# Patient Record
Sex: MALE
[De-identification: names, ages, dates, MRNs, and addresses within clinical notes are randomized per-mention and may not be internally consistent; named-entity substitution may affect disease eponyms.]

---

## 2017-04-25 ENCOUNTER — APPOINTMENT (OUTPATIENT)
Dept: INTERNAL MEDICINE | Facility: CLINIC | Age: 37
End: 2017-04-25

## 2017-04-25 VITALS
HEART RATE: 84 BPM | DIASTOLIC BLOOD PRESSURE: 88 MMHG | WEIGHT: 222 LBS | BODY MASS INDEX: 34.77 KG/M2 | SYSTOLIC BLOOD PRESSURE: 118 MMHG | TEMPERATURE: 98.3 F

## 2017-04-25 LAB
APPEARANCE: CLEAR
BACTERIA: NEGATIVE
BASOPHILS # BLD AUTO: 0.04 K/UL
BASOPHILS NFR BLD AUTO: 0.5 %
BILIRUBIN URINE: NEGATIVE
BLOOD URINE: NEGATIVE
COLOR: YELLOW
EOSINOPHIL # BLD AUTO: 0.12 K/UL
EOSINOPHIL NFR BLD AUTO: 1.6 %
GLUCOSE QUALITATIVE U: NORMAL MG/DL
HCT VFR BLD CALC: 46.3 %
HGB BLD-MCNC: 16.2 G/DL
HYALINE CASTS: 0 /LPF
IMM GRANULOCYTES NFR BLD AUTO: 0.4 %
KETONES URINE: NEGATIVE
LEUKOCYTE ESTERASE URINE: NEGATIVE
LYMPHOCYTES # BLD AUTO: 2.17 K/UL
LYMPHOCYTES NFR BLD AUTO: 28.4 %
MAN DIFF?: NORMAL
MCHC RBC-ENTMCNC: 32.1 PG
MCHC RBC-ENTMCNC: 35 GM/DL
MCV RBC AUTO: 91.9 FL
MICROSCOPIC-UA: NORMAL
MONOCYTES # BLD AUTO: 0.46 K/UL
MONOCYTES NFR BLD AUTO: 6 %
NEUTROPHILS # BLD AUTO: 4.83 K/UL
NEUTROPHILS NFR BLD AUTO: 63.1 %
NITRITE URINE: NEGATIVE
PH URINE: 5.5
PLATELET # BLD AUTO: 253 K/UL
PROTEIN URINE: NEGATIVE MG/DL
RBC # BLD: 5.04 M/UL
RBC # FLD: 12.9 %
RED BLOOD CELLS URINE: 1 /HPF
SPECIFIC GRAVITY URINE: 1.02
SQUAMOUS EPITHELIAL CELLS: 1 /HPF
UROBILINOGEN URINE: NORMAL MG/DL
WBC # FLD AUTO: 7.65 K/UL
WHITE BLOOD CELLS URINE: 2 /HPF

## 2017-04-26 ENCOUNTER — TRANSCRIPTION ENCOUNTER (OUTPATIENT)
Age: 37
End: 2017-04-26

## 2017-04-26 LAB
25(OH)D3 SERPL-MCNC: 8.6 NG/ML
ALBUMIN SERPL ELPH-MCNC: 4.6 G/DL
ALP BLD-CCNC: 98 U/L
ALT SERPL-CCNC: 79 U/L
ANION GAP SERPL CALC-SCNC: 18 MMOL/L
AST SERPL-CCNC: 51 U/L
BILIRUB SERPL-MCNC: 0.3 MG/DL
BUN SERPL-MCNC: 13 MG/DL
CALCIUM SERPL-MCNC: 9.3 MG/DL
CHLORIDE SERPL-SCNC: 104 MMOL/L
CHOLEST SERPL-MCNC: 256 MG/DL
CHOLEST/HDLC SERPL: 9.5 RATIO
CO2 SERPL-SCNC: 19 MMOL/L
CREAT SERPL-MCNC: 0.93 MG/DL
FOLATE SERPL-MCNC: 8.5 NG/ML
FRUCTOSAMINE SERPL-MCNC: 373 UMOL/L
GLUCOSE SERPL-MCNC: 164 MG/DL
HBA1C MFR BLD HPLC: 11.7 %
HDLC SERPL-MCNC: 27 MG/DL
LDLC SERPL CALC-MCNC: NORMAL MG/DL
MAGNESIUM SERPL-MCNC: 1.8 MG/DL
POTASSIUM SERPL-SCNC: 4.2 MMOL/L
PROT SERPL-MCNC: 7.4 G/DL
SODIUM SERPL-SCNC: 141 MMOL/L
TRIGL SERPL-MCNC: 1180 MG/DL
VIT B12 SERPL-MCNC: 621 PG/ML

## 2017-05-15 ENCOUNTER — APPOINTMENT (OUTPATIENT)
Dept: INTERNAL MEDICINE | Facility: CLINIC | Age: 37
End: 2017-05-15

## 2017-05-15 VITALS
DIASTOLIC BLOOD PRESSURE: 86 MMHG | BODY MASS INDEX: 35.55 KG/M2 | SYSTOLIC BLOOD PRESSURE: 124 MMHG | HEART RATE: 79 BPM | TEMPERATURE: 99.4 F | WEIGHT: 227 LBS

## 2017-06-12 ENCOUNTER — APPOINTMENT (OUTPATIENT)
Dept: ENDOCRINOLOGY | Facility: CLINIC | Age: 37
End: 2017-06-12

## 2017-07-05 ENCOUNTER — APPOINTMENT (OUTPATIENT)
Dept: INTERNAL MEDICINE | Facility: CLINIC | Age: 37
End: 2017-07-05

## 2018-05-22 ENCOUNTER — APPOINTMENT (OUTPATIENT)
Dept: INTERNAL MEDICINE | Facility: CLINIC | Age: 38
End: 2018-05-22

## 2020-02-21 ENCOUNTER — INPATIENT (INPATIENT)
Facility: HOSPITAL | Age: 40
LOS: 3 days | Discharge: HOME | DRG: 439 | End: 2020-02-25
Attending: INTERNAL MEDICINE | Admitting: INTERNAL MEDICINE
Payer: MEDICAID

## 2020-02-21 VITALS
DIASTOLIC BLOOD PRESSURE: 122 MMHG | RESPIRATION RATE: 16 BRPM | HEART RATE: 75 BPM | WEIGHT: 210.1 LBS | HEIGHT: 67 IN | TEMPERATURE: 98 F | OXYGEN SATURATION: 99 % | SYSTOLIC BLOOD PRESSURE: 172 MMHG

## 2020-02-21 DIAGNOSIS — K85.90 ACUTE PANCREATITIS WITHOUT NECROSIS OR INFECTION, UNSPECIFIED: ICD-10-CM

## 2020-02-21 DIAGNOSIS — Z98.89 OTHER SPECIFIED POSTPROCEDURAL STATES: Chronic | ICD-10-CM

## 2020-02-21 LAB
ALBUMIN SERPL ELPH-MCNC: 3.7 G/DL — SIGNIFICANT CHANGE UP (ref 3.5–5)
ALP SERPL-CCNC: 88 U/L — SIGNIFICANT CHANGE UP (ref 40–120)
ALT FLD-CCNC: 107 U/L DA — HIGH (ref 10–60)
ANION GAP SERPL CALC-SCNC: 14 MMOL/L — SIGNIFICANT CHANGE UP (ref 5–17)
APTT BLD: 32.6 SEC — SIGNIFICANT CHANGE UP (ref 27.5–36.3)
AST SERPL-CCNC: 85 U/L — HIGH (ref 10–40)
BASOPHILS # BLD AUTO: 0.05 K/UL — SIGNIFICANT CHANGE UP (ref 0–0.2)
BASOPHILS NFR BLD AUTO: 0.6 % — SIGNIFICANT CHANGE UP (ref 0–2)
BILIRUB SERPL-MCNC: 0.9 MG/DL — SIGNIFICANT CHANGE UP (ref 0.2–1.2)
BUN SERPL-MCNC: 13 MG/DL — SIGNIFICANT CHANGE UP (ref 7–18)
CALCIUM SERPL-MCNC: 8.2 MG/DL — LOW (ref 8.4–10.5)
CHLORIDE SERPL-SCNC: 96 MMOL/L — SIGNIFICANT CHANGE UP (ref 96–108)
CO2 SERPL-SCNC: 21 MMOL/L — LOW (ref 22–31)
CREAT SERPL-MCNC: 0.95 MG/DL — SIGNIFICANT CHANGE UP (ref 0.5–1.3)
EOSINOPHIL # BLD AUTO: 0.04 K/UL — SIGNIFICANT CHANGE UP (ref 0–0.5)
EOSINOPHIL NFR BLD AUTO: 0.5 % — SIGNIFICANT CHANGE UP (ref 0–6)
GLUCOSE SERPL-MCNC: 348 MG/DL — HIGH (ref 70–99)
HCT VFR BLD CALC: 42.3 % — SIGNIFICANT CHANGE UP (ref 39–50)
HGB BLD-MCNC: 17.5 G/DL — HIGH (ref 13–17)
IMM GRANULOCYTES NFR BLD AUTO: 0.6 % — SIGNIFICANT CHANGE UP (ref 0–1.5)
INR BLD: 0.95 RATIO — SIGNIFICANT CHANGE UP (ref 0.88–1.16)
LIDOCAIN IGE QN: 5943 U/L — HIGH (ref 73–393)
LYMPHOCYTES # BLD AUTO: 1.09 K/UL — SIGNIFICANT CHANGE UP (ref 1–3.3)
LYMPHOCYTES # BLD AUTO: 12.8 % — LOW (ref 13–44)
MAGNESIUM SERPL-MCNC: 1.9 MG/DL — SIGNIFICANT CHANGE UP (ref 1.6–2.6)
MCHC RBC-ENTMCNC: 31.9 PG — SIGNIFICANT CHANGE UP (ref 27–34)
MCHC RBC-ENTMCNC: 35.5 GM/DL — SIGNIFICANT CHANGE UP (ref 32–36)
MCV RBC AUTO: 90 FL — SIGNIFICANT CHANGE UP (ref 80–100)
MONOCYTES # BLD AUTO: 0.41 K/UL — SIGNIFICANT CHANGE UP (ref 0–0.9)
MONOCYTES NFR BLD AUTO: 4.8 % — SIGNIFICANT CHANGE UP (ref 2–14)
NEUTROPHILS # BLD AUTO: 6.87 K/UL — SIGNIFICANT CHANGE UP (ref 1.8–7.4)
NEUTROPHILS NFR BLD AUTO: 80.7 % — HIGH (ref 43–77)
NRBC # BLD: 0 /100 WBCS — SIGNIFICANT CHANGE UP (ref 0–0)
PLATELET # BLD AUTO: 198 K/UL — SIGNIFICANT CHANGE UP (ref 150–400)
POTASSIUM SERPL-MCNC: 4.7 MMOL/L — SIGNIFICANT CHANGE UP (ref 3.5–5.3)
POTASSIUM SERPL-SCNC: 4.7 MMOL/L — SIGNIFICANT CHANGE UP (ref 3.5–5.3)
PROT SERPL-MCNC: 6.1 G/DL — SIGNIFICANT CHANGE UP (ref 6–8.3)
PROTHROM AB SERPL-ACNC: 10.5 SEC — SIGNIFICANT CHANGE UP (ref 10–12.9)
RBC # BLD: 4.7 M/UL — SIGNIFICANT CHANGE UP (ref 4.2–5.8)
RBC # FLD: 12.4 % — SIGNIFICANT CHANGE UP (ref 10.3–14.5)
SODIUM SERPL-SCNC: 131 MMOL/L — LOW (ref 135–145)
TROPONIN I SERPL-MCNC: <0.015 NG/ML — SIGNIFICANT CHANGE UP (ref 0–0.04)
WBC # BLD: 11.05 K/UL — HIGH (ref 3.8–10.5)
WBC # FLD AUTO: 11.05 K/UL — HIGH (ref 3.8–10.5)

## 2020-02-21 PROCEDURE — 99285 EMERGENCY DEPT VISIT HI MDM: CPT

## 2020-02-21 RX ORDER — SODIUM CHLORIDE 9 MG/ML
2000 INJECTION INTRAMUSCULAR; INTRAVENOUS; SUBCUTANEOUS ONCE
Refills: 0 | Status: COMPLETED | OUTPATIENT
Start: 2020-02-21 | End: 2020-02-21

## 2020-02-21 RX ORDER — LABETALOL HCL 100 MG
10 TABLET ORAL ONCE
Refills: 0 | Status: COMPLETED | OUTPATIENT
Start: 2020-02-21 | End: 2020-02-21

## 2020-02-21 RX ORDER — MORPHINE SULFATE 50 MG/1
8 CAPSULE, EXTENDED RELEASE ORAL ONCE
Refills: 0 | Status: DISCONTINUED | OUTPATIENT
Start: 2020-02-21 | End: 2020-02-21

## 2020-02-21 RX ADMIN — MORPHINE SULFATE 8 MILLIGRAM(S): 50 CAPSULE, EXTENDED RELEASE ORAL at 23:04

## 2020-02-21 RX ADMIN — SODIUM CHLORIDE 2000 MILLILITER(S): 9 INJECTION INTRAMUSCULAR; INTRAVENOUS; SUBCUTANEOUS at 23:04

## 2020-02-21 RX ADMIN — Medication 10 MILLIGRAM(S): at 21:58

## 2020-02-21 NOTE — ED PROVIDER NOTE - CLINICAL SUMMARY MEDICAL DECISION MAKING FREE TEXT BOX
Patient with epigastric pain and history of alcohol use. Possible pancreatitis. Patient also has history of HTN, so less likely, but possible aortic dissection. In the ED will do pain control, blood pressure control, and labs. Reassess.

## 2020-02-21 NOTE — ED PROVIDER NOTE - OBJECTIVE STATEMENT
39 year old male with PMHx of DM and HTN presenting for evaluation of chest pain/epigastric abd pain x2 days. Patient describes the pain as stabbing pain in the epigastrium/sternum area that is non radiating, constant, and worsening. Patient notes that symptoms are not associated with shortness of breath. Patient does report that 1 week ago he had back pain and that 5 days ago, patient admits to excessive drinking (alcohol).

## 2020-02-21 NOTE — ED PROVIDER NOTE - GASTROINTESTINAL, MLM
Patient with tenderness to palpation over the epigastric and RUQ. Abd soft, nondistended, no rebound, no guarding.

## 2020-02-21 NOTE — ED ADULT NURSE NOTE - OBJECTIVE STATEMENT
pt is here for abdominal pain.  pt stated that abdominal pain since yesterday, c/o pain 8/10, denied N/V/D or sob, denied chest pain or fever, pt calm at this time.

## 2020-02-22 DIAGNOSIS — E78.1 PURE HYPERGLYCERIDEMIA: ICD-10-CM

## 2020-02-22 DIAGNOSIS — E11.9 TYPE 2 DIABETES MELLITUS WITHOUT COMPLICATIONS: ICD-10-CM

## 2020-02-22 LAB
24R-OH-CALCIDIOL SERPL-MCNC: <5 NG/ML — LOW (ref 30–80)
ALBUMIN SERPL ELPH-MCNC: 3 G/DL — LOW (ref 3.5–5)
ALP SERPL-CCNC: 73 U/L — SIGNIFICANT CHANGE UP (ref 40–120)
ALT FLD-CCNC: 55 U/L DA — SIGNIFICANT CHANGE UP (ref 10–60)
ANION GAP SERPL CALC-SCNC: 12 MMOL/L — SIGNIFICANT CHANGE UP (ref 5–17)
ANION GAP SERPL CALC-SCNC: 6 MMOL/L — SIGNIFICANT CHANGE UP (ref 5–17)
ANION GAP SERPL CALC-SCNC: 8 MMOL/L — SIGNIFICANT CHANGE UP (ref 5–17)
ANION GAP SERPL CALC-SCNC: 9 MMOL/L — SIGNIFICANT CHANGE UP (ref 5–17)
APPEARANCE UR: CLEAR — SIGNIFICANT CHANGE UP
AST SERPL-CCNC: 25 U/L — SIGNIFICANT CHANGE UP (ref 10–40)
BILIRUB SERPL-MCNC: 0.5 MG/DL — SIGNIFICANT CHANGE UP (ref 0.2–1.2)
BILIRUB UR-MCNC: NEGATIVE — SIGNIFICANT CHANGE UP
BUN SERPL-MCNC: 4 MG/DL — LOW (ref 7–18)
BUN SERPL-MCNC: 5 MG/DL — LOW (ref 7–18)
BUN SERPL-MCNC: 8 MG/DL — SIGNIFICANT CHANGE UP (ref 7–18)
BUN SERPL-MCNC: 9 MG/DL — SIGNIFICANT CHANGE UP (ref 7–18)
CALCIUM SERPL-MCNC: 7.8 MG/DL — LOW (ref 8.4–10.5)
CALCIUM SERPL-MCNC: 7.8 MG/DL — LOW (ref 8.4–10.5)
CALCIUM SERPL-MCNC: 7.9 MG/DL — LOW (ref 8.4–10.5)
CALCIUM SERPL-MCNC: 8.3 MG/DL — LOW (ref 8.4–10.5)
CHLORIDE SERPL-SCNC: 102 MMOL/L — SIGNIFICANT CHANGE UP (ref 96–108)
CHLORIDE SERPL-SCNC: 104 MMOL/L — SIGNIFICANT CHANGE UP (ref 96–108)
CHLORIDE SERPL-SCNC: 105 MMOL/L — SIGNIFICANT CHANGE UP (ref 96–108)
CHLORIDE SERPL-SCNC: 107 MMOL/L — SIGNIFICANT CHANGE UP (ref 96–108)
CHOLEST SERPL-MCNC: 232 MG/DL — HIGH (ref 10–199)
CO2 SERPL-SCNC: 20 MMOL/L — LOW (ref 22–31)
CO2 SERPL-SCNC: 23 MMOL/L — SIGNIFICANT CHANGE UP (ref 22–31)
CO2 SERPL-SCNC: 25 MMOL/L — SIGNIFICANT CHANGE UP (ref 22–31)
CO2 SERPL-SCNC: 25 MMOL/L — SIGNIFICANT CHANGE UP (ref 22–31)
COLOR SPEC: YELLOW — SIGNIFICANT CHANGE UP
CREAT SERPL-MCNC: 0.73 MG/DL — SIGNIFICANT CHANGE UP (ref 0.5–1.3)
CREAT SERPL-MCNC: 0.78 MG/DL — SIGNIFICANT CHANGE UP (ref 0.5–1.3)
CREAT SERPL-MCNC: 0.83 MG/DL — SIGNIFICANT CHANGE UP (ref 0.5–1.3)
CREAT SERPL-MCNC: 0.84 MG/DL — SIGNIFICANT CHANGE UP (ref 0.5–1.3)
DIFF PNL FLD: NEGATIVE — SIGNIFICANT CHANGE UP
FOLATE SERPL-MCNC: 14.8 NG/ML — SIGNIFICANT CHANGE UP
GLUCOSE BLDC GLUCOMTR-MCNC: 165 MG/DL — HIGH (ref 70–99)
GLUCOSE BLDC GLUCOMTR-MCNC: 169 MG/DL — HIGH (ref 70–99)
GLUCOSE BLDC GLUCOMTR-MCNC: 170 MG/DL — HIGH (ref 70–99)
GLUCOSE BLDC GLUCOMTR-MCNC: 175 MG/DL — HIGH (ref 70–99)
GLUCOSE BLDC GLUCOMTR-MCNC: 175 MG/DL — HIGH (ref 70–99)
GLUCOSE BLDC GLUCOMTR-MCNC: 176 MG/DL — HIGH (ref 70–99)
GLUCOSE BLDC GLUCOMTR-MCNC: 178 MG/DL — HIGH (ref 70–99)
GLUCOSE BLDC GLUCOMTR-MCNC: 180 MG/DL — HIGH (ref 70–99)
GLUCOSE BLDC GLUCOMTR-MCNC: 181 MG/DL — HIGH (ref 70–99)
GLUCOSE BLDC GLUCOMTR-MCNC: 184 MG/DL — HIGH (ref 70–99)
GLUCOSE BLDC GLUCOMTR-MCNC: 191 MG/DL — HIGH (ref 70–99)
GLUCOSE BLDC GLUCOMTR-MCNC: 192 MG/DL — HIGH (ref 70–99)
GLUCOSE BLDC GLUCOMTR-MCNC: 193 MG/DL — HIGH (ref 70–99)
GLUCOSE BLDC GLUCOMTR-MCNC: 195 MG/DL — HIGH (ref 70–99)
GLUCOSE BLDC GLUCOMTR-MCNC: 197 MG/DL — HIGH (ref 70–99)
GLUCOSE BLDC GLUCOMTR-MCNC: 201 MG/DL — HIGH (ref 70–99)
GLUCOSE BLDC GLUCOMTR-MCNC: 207 MG/DL — HIGH (ref 70–99)
GLUCOSE BLDC GLUCOMTR-MCNC: 214 MG/DL — HIGH (ref 70–99)
GLUCOSE BLDC GLUCOMTR-MCNC: 218 MG/DL — HIGH (ref 70–99)
GLUCOSE BLDC GLUCOMTR-MCNC: 234 MG/DL — HIGH (ref 70–99)
GLUCOSE BLDC GLUCOMTR-MCNC: 243 MG/DL — HIGH (ref 70–99)
GLUCOSE BLDC GLUCOMTR-MCNC: 245 MG/DL — HIGH (ref 70–99)
GLUCOSE BLDC GLUCOMTR-MCNC: 268 MG/DL — HIGH (ref 70–99)
GLUCOSE BLDC GLUCOMTR-MCNC: 270 MG/DL — HIGH (ref 70–99)
GLUCOSE SERPL-MCNC: 196 MG/DL — HIGH (ref 70–99)
GLUCOSE SERPL-MCNC: 207 MG/DL — HIGH (ref 70–99)
GLUCOSE SERPL-MCNC: 244 MG/DL — HIGH (ref 70–99)
GLUCOSE SERPL-MCNC: 274 MG/DL — HIGH (ref 70–99)
GLUCOSE UR QL: 1000 MG/DL
HBA1C BLD-MCNC: 11.9 % — HIGH (ref 4–5.6)
HCT VFR BLD CALC: 40.6 % — SIGNIFICANT CHANGE UP (ref 39–50)
HDLC SERPL-MCNC: 20 MG/DL — LOW
HGB BLD-MCNC: 14.4 G/DL — SIGNIFICANT CHANGE UP (ref 13–17)
KETONES UR-MCNC: ABNORMAL
LEUKOCYTE ESTERASE UR-ACNC: NEGATIVE — SIGNIFICANT CHANGE UP
LIDOCAIN IGE QN: 3895 U/L — HIGH (ref 73–393)
LIPID PNL WITH DIRECT LDL SERPL: -171 MG/DL — SIGNIFICANT CHANGE UP
MAGNESIUM SERPL-MCNC: 1.6 MG/DL — SIGNIFICANT CHANGE UP (ref 1.6–2.6)
MAGNESIUM SERPL-MCNC: 1.8 MG/DL — SIGNIFICANT CHANGE UP (ref 1.6–2.6)
MCHC RBC-ENTMCNC: 31.9 PG — SIGNIFICANT CHANGE UP (ref 27–34)
MCHC RBC-ENTMCNC: 35.5 GM/DL — SIGNIFICANT CHANGE UP (ref 32–36)
MCV RBC AUTO: 90 FL — SIGNIFICANT CHANGE UP (ref 80–100)
MRSA PCR RESULT.: SIGNIFICANT CHANGE UP
NITRITE UR-MCNC: NEGATIVE — SIGNIFICANT CHANGE UP
NRBC # BLD: 0 /100 WBCS — SIGNIFICANT CHANGE UP (ref 0–0)
NT-PROBNP SERPL-SCNC: 39 PG/ML — SIGNIFICANT CHANGE UP (ref 0–125)
PH UR: 5 — SIGNIFICANT CHANGE UP (ref 5–8)
PHOSPHATE SERPL-MCNC: 1.5 MG/DL — LOW (ref 2.5–4.5)
PHOSPHATE SERPL-MCNC: 2.3 MG/DL — LOW (ref 2.5–4.5)
PLATELET # BLD AUTO: 148 K/UL — LOW (ref 150–400)
POTASSIUM SERPL-MCNC: 3.3 MMOL/L — LOW (ref 3.5–5.3)
POTASSIUM SERPL-MCNC: 3.6 MMOL/L — SIGNIFICANT CHANGE UP (ref 3.5–5.3)
POTASSIUM SERPL-MCNC: 4.1 MMOL/L — SIGNIFICANT CHANGE UP (ref 3.5–5.3)
POTASSIUM SERPL-MCNC: 4.3 MMOL/L — SIGNIFICANT CHANGE UP (ref 3.5–5.3)
POTASSIUM SERPL-SCNC: 3.3 MMOL/L — LOW (ref 3.5–5.3)
POTASSIUM SERPL-SCNC: 3.6 MMOL/L — SIGNIFICANT CHANGE UP (ref 3.5–5.3)
POTASSIUM SERPL-SCNC: 4.1 MMOL/L — SIGNIFICANT CHANGE UP (ref 3.5–5.3)
POTASSIUM SERPL-SCNC: 4.3 MMOL/L — SIGNIFICANT CHANGE UP (ref 3.5–5.3)
PROT SERPL-MCNC: 6 G/DL — SIGNIFICANT CHANGE UP (ref 6–8.3)
PROT UR-MCNC: 100
RBC # BLD: 4.51 M/UL — SIGNIFICANT CHANGE UP (ref 4.2–5.8)
RBC # FLD: 12.3 % — SIGNIFICANT CHANGE UP (ref 10.3–14.5)
S AUREUS DNA NOSE QL NAA+PROBE: DETECTED
SODIUM SERPL-SCNC: 135 MMOL/L — SIGNIFICANT CHANGE UP (ref 135–145)
SODIUM SERPL-SCNC: 136 MMOL/L — SIGNIFICANT CHANGE UP (ref 135–145)
SODIUM SERPL-SCNC: 137 MMOL/L — SIGNIFICANT CHANGE UP (ref 135–145)
SODIUM SERPL-SCNC: 138 MMOL/L — SIGNIFICANT CHANGE UP (ref 135–145)
SP GR SPEC: 1.02 — SIGNIFICANT CHANGE UP (ref 1.01–1.02)
TOTAL CHOLESTEROL/HDL RATIO MEASUREMENT: 11.6 RATIO — HIGH (ref 3.4–9.6)
TRIGL SERPL-MCNC: 1878 MG/DL — HIGH (ref 10–149)
TRIGL SERPL-MCNC: 1915 MG/DL — HIGH (ref 10–149)
UROBILINOGEN FLD QL: NEGATIVE — SIGNIFICANT CHANGE UP
VIT B12 SERPL-MCNC: 456 PG/ML — SIGNIFICANT CHANGE UP (ref 232–1245)
WBC # BLD: 8.92 K/UL — SIGNIFICANT CHANGE UP (ref 3.8–10.5)
WBC # FLD AUTO: 8.92 K/UL — SIGNIFICANT CHANGE UP (ref 3.8–10.5)

## 2020-02-22 PROCEDURE — 99291 CRITICAL CARE FIRST HOUR: CPT

## 2020-02-22 PROCEDURE — 74177 CT ABD & PELVIS W/CONTRAST: CPT | Mod: 26

## 2020-02-22 RX ORDER — INSULIN HUMAN 100 [IU]/ML
10 INJECTION, SOLUTION SUBCUTANEOUS
Qty: 100 | Refills: 0 | Status: DISCONTINUED | OUTPATIENT
Start: 2020-02-22 | End: 2020-02-23

## 2020-02-22 RX ORDER — SODIUM CHLORIDE 9 MG/ML
1000 INJECTION, SOLUTION INTRAVENOUS
Refills: 0 | Status: DISCONTINUED | OUTPATIENT
Start: 2020-02-22 | End: 2020-02-23

## 2020-02-22 RX ORDER — THIAMINE MONONITRATE (VIT B1) 100 MG
100 TABLET ORAL DAILY
Refills: 0 | Status: DISCONTINUED | OUTPATIENT
Start: 2020-02-22 | End: 2020-02-22

## 2020-02-22 RX ORDER — KETOROLAC TROMETHAMINE 30 MG/ML
15 SYRINGE (ML) INJECTION EVERY 8 HOURS
Refills: 0 | Status: DISCONTINUED | OUTPATIENT
Start: 2020-02-22 | End: 2020-02-22

## 2020-02-22 RX ORDER — KETOROLAC TROMETHAMINE 30 MG/ML
30 SYRINGE (ML) INJECTION EVERY 8 HOURS
Refills: 0 | Status: DISCONTINUED | OUTPATIENT
Start: 2020-02-22 | End: 2020-02-25

## 2020-02-22 RX ORDER — POTASSIUM PHOSPHATE, MONOBASIC POTASSIUM PHOSPHATE, DIBASIC 236; 224 MG/ML; MG/ML
15 INJECTION, SOLUTION INTRAVENOUS ONCE
Refills: 0 | Status: COMPLETED | OUTPATIENT
Start: 2020-02-22 | End: 2020-02-22

## 2020-02-22 RX ORDER — SODIUM CHLORIDE 9 MG/ML
1000 INJECTION, SOLUTION INTRAVENOUS
Refills: 0 | Status: DISCONTINUED | OUTPATIENT
Start: 2020-02-22 | End: 2020-02-22

## 2020-02-22 RX ORDER — PANTOPRAZOLE SODIUM 20 MG/1
40 TABLET, DELAYED RELEASE ORAL DAILY
Refills: 0 | Status: DISCONTINUED | OUTPATIENT
Start: 2020-02-22 | End: 2020-02-25

## 2020-02-22 RX ORDER — MORPHINE SULFATE 50 MG/1
2 CAPSULE, EXTENDED RELEASE ORAL EVERY 6 HOURS
Refills: 0 | Status: DISCONTINUED | OUTPATIENT
Start: 2020-02-22 | End: 2020-02-22

## 2020-02-22 RX ORDER — SODIUM CHLORIDE 9 MG/ML
1000 INJECTION, SOLUTION INTRAVENOUS ONCE
Refills: 0 | Status: COMPLETED | OUTPATIENT
Start: 2020-02-22 | End: 2020-02-22

## 2020-02-22 RX ORDER — ENOXAPARIN SODIUM 100 MG/ML
40 INJECTION SUBCUTANEOUS DAILY
Refills: 0 | Status: DISCONTINUED | OUTPATIENT
Start: 2020-02-22 | End: 2020-02-25

## 2020-02-22 RX ORDER — THIAMINE MONONITRATE (VIT B1) 100 MG
100 TABLET ORAL DAILY
Refills: 0 | Status: DISCONTINUED | OUTPATIENT
Start: 2020-02-22 | End: 2020-02-24

## 2020-02-22 RX ORDER — POTASSIUM CHLORIDE 20 MEQ
40 PACKET (EA) ORAL EVERY 4 HOURS
Refills: 0 | Status: DISCONTINUED | OUTPATIENT
Start: 2020-02-22 | End: 2020-02-22

## 2020-02-22 RX ORDER — CHLORHEXIDINE GLUCONATE 213 G/1000ML
1 SOLUTION TOPICAL DAILY
Refills: 0 | Status: DISCONTINUED | OUTPATIENT
Start: 2020-02-22 | End: 2020-02-23

## 2020-02-22 RX ORDER — INSULIN HUMAN 100 [IU]/ML
10 INJECTION, SOLUTION SUBCUTANEOUS
Qty: 100 | Refills: 0 | Status: DISCONTINUED | OUTPATIENT
Start: 2020-02-22 | End: 2020-02-22

## 2020-02-22 RX ORDER — ACETAMINOPHEN 500 MG
650 TABLET ORAL EVERY 6 HOURS
Refills: 0 | Status: DISCONTINUED | OUTPATIENT
Start: 2020-02-22 | End: 2020-02-22

## 2020-02-22 RX ORDER — POTASSIUM CHLORIDE 20 MEQ
40 PACKET (EA) ORAL EVERY 4 HOURS
Refills: 0 | Status: COMPLETED | OUTPATIENT
Start: 2020-02-22 | End: 2020-02-22

## 2020-02-22 RX ORDER — CHLORHEXIDINE GLUCONATE 213 G/1000ML
1 SOLUTION TOPICAL
Refills: 0 | Status: DISCONTINUED | OUTPATIENT
Start: 2020-02-22 | End: 2020-02-22

## 2020-02-22 RX ORDER — MORPHINE SULFATE 50 MG/1
2 CAPSULE, EXTENDED RELEASE ORAL EVERY 4 HOURS
Refills: 0 | Status: DISCONTINUED | OUTPATIENT
Start: 2020-02-22 | End: 2020-02-25

## 2020-02-22 RX ORDER — ERGOCALCIFEROL 1.25 MG/1
50000 CAPSULE ORAL
Refills: 0 | Status: DISCONTINUED | OUTPATIENT
Start: 2020-02-22 | End: 2020-02-25

## 2020-02-22 RX ORDER — GEMFIBROZIL 600 MG
600 TABLET ORAL
Refills: 0 | Status: DISCONTINUED | OUTPATIENT
Start: 2020-02-23 | End: 2020-02-25

## 2020-02-22 RX ORDER — INSULIN HUMAN 100 [IU]/ML
8 INJECTION, SOLUTION SUBCUTANEOUS
Qty: 100 | Refills: 0 | Status: DISCONTINUED | OUTPATIENT
Start: 2020-02-22 | End: 2020-02-22

## 2020-02-22 RX ORDER — INSULIN HUMAN 100 [IU]/ML
11 INJECTION, SOLUTION SUBCUTANEOUS
Qty: 100 | Refills: 0 | Status: DISCONTINUED | OUTPATIENT
Start: 2020-02-22 | End: 2020-02-22

## 2020-02-22 RX ORDER — DEXTROSE 10 % IN WATER 10 %
1000 INTRAVENOUS SOLUTION INTRAVENOUS
Refills: 0 | Status: DISCONTINUED | OUTPATIENT
Start: 2020-02-22 | End: 2020-02-23

## 2020-02-22 RX ORDER — SENNA PLUS 8.6 MG/1
2 TABLET ORAL AT BEDTIME
Refills: 0 | Status: DISCONTINUED | OUTPATIENT
Start: 2020-02-22 | End: 2020-02-25

## 2020-02-22 RX ORDER — INFLUENZA VIRUS VACCINE 15; 15; 15; 15 UG/.5ML; UG/.5ML; UG/.5ML; UG/.5ML
0.5 SUSPENSION INTRAMUSCULAR ONCE
Refills: 0 | Status: DISCONTINUED | OUTPATIENT
Start: 2020-02-22 | End: 2020-02-25

## 2020-02-22 RX ORDER — MUPIROCIN 20 MG/G
1 OINTMENT TOPICAL
Refills: 0 | Status: DISCONTINUED | OUTPATIENT
Start: 2020-02-22 | End: 2020-02-22

## 2020-02-22 RX ORDER — ACETAMINOPHEN 500 MG
650 TABLET ORAL EVERY 6 HOURS
Refills: 0 | Status: DISCONTINUED | OUTPATIENT
Start: 2020-02-22 | End: 2020-02-25

## 2020-02-22 RX ORDER — MORPHINE SULFATE 50 MG/1
2 CAPSULE, EXTENDED RELEASE ORAL ONCE
Refills: 0 | Status: DISCONTINUED | OUTPATIENT
Start: 2020-02-22 | End: 2020-02-22

## 2020-02-22 RX ORDER — FOLIC ACID 0.8 MG
1 TABLET ORAL DAILY
Refills: 0 | Status: DISCONTINUED | OUTPATIENT
Start: 2020-02-22 | End: 2020-02-25

## 2020-02-22 RX ADMIN — Medication 100 MILLIGRAM(S): at 11:56

## 2020-02-22 RX ADMIN — Medication 30 MILLIGRAM(S): at 14:15

## 2020-02-22 RX ADMIN — Medication 30 MILLIGRAM(S): at 05:37

## 2020-02-22 RX ADMIN — ENOXAPARIN SODIUM 40 MILLIGRAM(S): 100 INJECTION SUBCUTANEOUS at 11:48

## 2020-02-22 RX ADMIN — Medication 40 MILLIEQUIVALENT(S): at 19:27

## 2020-02-22 RX ADMIN — SODIUM CHLORIDE 160 MILLILITER(S): 9 INJECTION, SOLUTION INTRAVENOUS at 01:59

## 2020-02-22 RX ADMIN — PANTOPRAZOLE SODIUM 40 MILLIGRAM(S): 20 TABLET, DELAYED RELEASE ORAL at 11:48

## 2020-02-22 RX ADMIN — INSULIN HUMAN 19 UNIT(S)/HR: 100 INJECTION, SOLUTION SUBCUTANEOUS at 01:10

## 2020-02-22 RX ADMIN — Medication 30 MILLIGRAM(S): at 21:45

## 2020-02-22 RX ADMIN — MORPHINE SULFATE 2 MILLIGRAM(S): 50 CAPSULE, EXTENDED RELEASE ORAL at 02:14

## 2020-02-22 RX ADMIN — MORPHINE SULFATE 2 MILLIGRAM(S): 50 CAPSULE, EXTENDED RELEASE ORAL at 01:59

## 2020-02-22 RX ADMIN — INSULIN HUMAN 10 UNIT(S)/HR: 100 INJECTION, SOLUTION SUBCUTANEOUS at 23:09

## 2020-02-22 RX ADMIN — INSULIN HUMAN 11 UNIT(S)/HR: 100 INJECTION, SOLUTION SUBCUTANEOUS at 01:59

## 2020-02-22 RX ADMIN — MORPHINE SULFATE 2 MILLIGRAM(S): 50 CAPSULE, EXTENDED RELEASE ORAL at 20:52

## 2020-02-22 RX ADMIN — Medication 40 MILLILITER(S): at 01:59

## 2020-02-22 RX ADMIN — Medication 30 MILLIGRAM(S): at 05:22

## 2020-02-22 RX ADMIN — Medication 15 MILLIGRAM(S): at 01:25

## 2020-02-22 RX ADMIN — Medication 30 MILLILITER(S): at 05:25

## 2020-02-22 RX ADMIN — CHLORHEXIDINE GLUCONATE 1 APPLICATION(S): 213 SOLUTION TOPICAL at 11:57

## 2020-02-22 RX ADMIN — Medication 30 MILLILITER(S): at 15:49

## 2020-02-22 RX ADMIN — Medication 1 MILLIGRAM(S): at 11:57

## 2020-02-22 RX ADMIN — Medication 40 MILLIEQUIVALENT(S): at 17:07

## 2020-02-22 RX ADMIN — Medication 30 MILLIGRAM(S): at 13:40

## 2020-02-22 RX ADMIN — SODIUM CHLORIDE 160 MILLILITER(S): 9 INJECTION, SOLUTION INTRAVENOUS at 09:02

## 2020-02-22 RX ADMIN — INSULIN HUMAN 8 UNIT(S)/HR: 100 INJECTION, SOLUTION SUBCUTANEOUS at 10:37

## 2020-02-22 RX ADMIN — Medication 40 MILLILITER(S): at 23:14

## 2020-02-22 RX ADMIN — Medication 650 MILLIGRAM(S): at 18:55

## 2020-02-22 RX ADMIN — Medication 30 MILLIGRAM(S): at 22:00

## 2020-02-22 RX ADMIN — MORPHINE SULFATE 2 MILLIGRAM(S): 50 CAPSULE, EXTENDED RELEASE ORAL at 05:44

## 2020-02-22 RX ADMIN — SODIUM CHLORIDE 1000 MILLILITER(S): 9 INJECTION, SOLUTION INTRAVENOUS at 10:30

## 2020-02-22 RX ADMIN — INSULIN HUMAN 11 UNIT(S)/HR: 100 INJECTION, SOLUTION SUBCUTANEOUS at 09:01

## 2020-02-22 RX ADMIN — Medication 650 MILLIGRAM(S): at 18:40

## 2020-02-22 RX ADMIN — Medication 15 MILLIGRAM(S): at 01:10

## 2020-02-22 RX ADMIN — POTASSIUM PHOSPHATE, MONOBASIC POTASSIUM PHOSPHATE, DIBASIC 62.5 MILLIMOLE(S): 236; 224 INJECTION, SOLUTION INTRAVENOUS at 23:09

## 2020-02-22 RX ADMIN — MORPHINE SULFATE 2 MILLIGRAM(S): 50 CAPSULE, EXTENDED RELEASE ORAL at 05:59

## 2020-02-22 RX ADMIN — MORPHINE SULFATE 8 MILLIGRAM(S): 50 CAPSULE, EXTENDED RELEASE ORAL at 00:02

## 2020-02-22 RX ADMIN — MORPHINE SULFATE 2 MILLIGRAM(S): 50 CAPSULE, EXTENDED RELEASE ORAL at 20:37

## 2020-02-22 NOTE — H&P ADULT - HISTORY OF PRESENT ILLNESS
Patient is 39 yr old M with PMH of chronic ETOH abuse (drinks 8-9 cans daily, last intake 3 days ago), chronic nicotine user through vaping, Htn, Type II DM ( stopped taking medications since 1 year), paraspinal hernia of back s/p surgery presented with complain of mid epigastric pain since 1 day. Patient states he initially develop intermittent Rt shoulder pain since one week and then developed sudden severe epigastric pain x 1 day. Epigastric pain was cramping, 10/10, progressively worsening, no radiation/ aggravating/ alleviating factors. He denies nausea/ vomiting/ diarrhea, constipation, fever, chills, previous episodes or any other complains. He denies taking any medications at home including OTC drugs. He was diagnosed with Htn and DM type II in 2015, Was prescribed metformin and coreg for DM and Htn but has stopped taking them since 1 year. His diet includes all fatty food including tacos. His last meal was tacos.

## 2020-02-22 NOTE — CONSULT NOTE ADULT - ASSESSMENT
Patient is 39 yr old M with PMH of chronic ETOH abuse (drinks 8-9 cans daily, last intake 3 days ago), chronic nicotine user through vaping, Htn, Type II DM ( stopped taking medications since 1 year), paraspinal hernia of back s/p surgery presented with complain of mid epigastric pain since 1 day. Found to have hypertriglyceridemia /pancreatitis and un cont dm. Pt self d/c metformin a year ago.

## 2020-02-22 NOTE — CONSULT NOTE ADULT - PROBLEM SELECTOR RECOMMENDATION 2
un cont due to non compliance with meds and diet  rec insulin tx - d/w pt and his fiance at the bedside   dm teaching  d/w hs

## 2020-02-22 NOTE — CONSULT NOTE ADULT - SUBJECTIVE AND OBJECTIVE BOX
HPI:  Patient is 39 yr old M with PMH of chronic ETOH abuse (drinks 8-9 cans daily, last intake 3 days ago), chronic nicotine user through vaping, Htn, Type II DM ( stopped taking medications since 1 year), paraspinal hernia of back s/p surgery presented with complain of mid epigastric pain since 1 day. Patient states he initially develop intermittent Rt shoulder pain since one week and then developed sudden severe epigastric pain x 1 day. Epigastric pain was cramping, 10/10, progressively worsening, no radiation/ aggravating/ alleviating factors. He denies nausea/ vomiting/ diarrhea, constipation, fever, chills, previous episodes or any other complains. He denies taking any medications at home including OTC drugs. He was diagnosed with Htn and DM type II in 2015, Was prescribed metformin and coreg for DM and Htn but has stopped taking them since 1 year. His diet includes all fatty f    PAST MEDICAL & SURGICAL HISTORY:  History of hypertension  LBP radiating to right leg  S/P lumbar discectomy: 2005    Allergies    penicillin (Rash)    Intolerances      FAMILY HISTORY:  No pertinent family history          Medications:  insulin regular Infusion 19 Unit(s)/Hr IV Continuous <Continuous>  lactated ringers. 1000 milliLiter(s) IV Continuous <Continuous>      vent settings      Vital Signs Last 24 Hrs  T(C): 36.7 (21 Feb 2020 20:48), Max: 36.7 (21 Feb 2020 20:48)  T(F): 98.1 (21 Feb 2020 20:48), Max: 98.1 (21 Feb 2020 20:48)  HR: 81 (21 Feb 2020 22:06) (75 - 81)  BP: 160/103 (21 Feb 2020 22:06) (160/103 - 172/122)  BP(mean): --  RR: 16 (21 Feb 2020 20:48) (16 - 16)  SpO2: 99% (21 Feb 2020 20:48) (99% - 99%)              LABS:                        17.5   11.05 )-----------( 198      ( 21 Feb 2020 21:57 )             42.3     02-21    131<L>  |  96  |  13  ----------------------------<  348<H>  4.7   |  21<L>  |  0.95    Ca    8.2<L>      21 Feb 2020 21:57  Mg     1.9     02-21    TPro  6.1  /  Alb  3.7  /  TBili  0.9  /  DBili  x   /  AST  85<H>  /  ALT  107<H>  /  AlkPhos  88  02-21      CARDIAC MARKERS ( 21 Feb 2020 21:57 )  <0.015 ng/mL / x     / x     / x     / x          CAPILLARY BLOOD GLUCOSE        PT/INR - ( 21 Feb 2020 21:57 )   PT: 10.5 sec;   INR: 0.95 ratio         PTT - ( 21 Feb 2020 21:57 )  PTT:32.6 sec    CULTURES:        Physical Examination:    >>>      RADIOLOGY REVIEWED ***            ASSESSMENT AND PLAN:      - Neuro    - Cardiovascular    - Pulm    - ID    - Nephro    - GI    - Heme    - Endocrine    - Skin/Catheters    - FEN    - Prophylaxis HPI:  Patient is 39 yr old M with PMH of chronic ETOH abuse (drinks 8-9 cans daily, last intake 3 days ago), chronic nicotine user through vaping, Htn, Type II DM ( stopped taking medications since 1 year), paraspinal hernia of back s/p surgery presented with complain of mid epigastric pain since 1 day. Patient states he initially develop intermittent Rt shoulder pain since one week and then developed sudden severe epigastric pain x 1 day. Epigastric pain was cramping, 10/10, progressively worsening, no radiation/ aggravating/ alleviating factors. He denies nausea/ vomiting/ diarrhea, constipation, fever, chills, previous episodes or any other complains. He denies taking any medications at home including OTC drugs. He was diagnosed with Htn and DM type II in 2015, Was prescribed metformin and coreg for DM and Htn but has stopped taking them since 1 year. His diet includes all fatty food including tacos. His last meal was tacos.    PAST MEDICAL & SURGICAL HISTORY:  History of hypertension  LBP radiating to right leg  S/P lumbar discectomy: 2005    Allergies    penicillin (Rash)    Intolerances      FAMILY HISTORY:  No pertinent family history          Medications:  insulin regular Infusion 19 Unit(s)/Hr IV Continuous <Continuous>  lactated ringers. 1000 milliLiter(s) IV Continuous <Continuous>      vent settings      Vital Signs Last 24 Hrs  T(C): 36.7 (21 Feb 2020 20:48), Max: 36.7 (21 Feb 2020 20:48)  T(F): 98.1 (21 Feb 2020 20:48), Max: 98.1 (21 Feb 2020 20:48)  HR: 81 (21 Feb 2020 22:06) (75 - 81)  BP: 160/103 (21 Feb 2020 22:06) (160/103 - 172/122)  BP(mean): --  RR: 16 (21 Feb 2020 20:48) (16 - 16)  SpO2: 99% (21 Feb 2020 20:48) (99% - 99%)              LABS:                        17.5   11.05 )-----------( 198      ( 21 Feb 2020 21:57 )             42.3     02-21    131<L>  |  96  |  13  ----------------------------<  348<H>  4.7   |  21<L>  |  0.95    Ca    8.2<L>      21 Feb 2020 21:57  Mg     1.9     02-21    TPro  6.1  /  Alb  3.7  /  TBili  0.9  /  DBili  x   /  AST  85<H>  /  ALT  107<H>  /  AlkPhos  88  02-21      CARDIAC MARKERS ( 21 Feb 2020 21:57 )  <0.015 ng/mL / x     / x     / x     / x          CAPILLARY BLOOD GLUCOSE        PT/INR - ( 21 Feb 2020 21:57 )   PT: 10.5 sec;   INR: 0.95 ratio         PTT - ( 21 Feb 2020 21:57 )  PTT:32.6 sec    CULTURES:      PHYSICAL EXAM:  GENERAL: NAD, obese  HEAD:  Atraumatic, Normocephalic  EYES:  conjunctiva and sclera clear  NECK: Supple, No JVD, Normal thyroid  CHEST/LUNG: Clear to auscultation. Clear to percussion bilaterally; No rales, rhonchi, wheezing, or rubs  HEART: Regular rate and rhythm; No murmurs, rubs, or gallops  ABDOMEN: Epigastric tenderness, Soft, Nondistended; Bowel sounds present  NERVOUS SYSTEM:  Alert & Oriented X3,    EXTREMITIES:  2+ Peripheral Pulses, No clubbing, cyanosis, or edema  SKIN: warm dry        ASSESSMENT AND PLAN:    39 yr old M with PMH of chronic ETOH abuse (drinks 8-9 cans daily, last intake 3 days ago), chronic nicotine user through vaping, Htn, Type II DM ( stopped taking medications since 1 year), paraspinal hernia of back s/p surgery presented with complain of mid epigastric pain since 1 day. Patient is admitted for ICU acute pancreatitis with hypertriglyceridemia     # Acute pancreatitis  # Acute hypertriglyceridemia  # ETOH abuse  # chronic nicotine through vaping  #Type II DM  #Htn  #Mild transaminitis      - Neuro;  Patient is alert and oriented, no active issues    - Cardiovascular  Hx of Htn: non compliant to medications ( used coreg)  EKG shows NSR, No ST-T wave inversion    - Pulm:  No active issues    - ID:  No signs of infection  No needs of IV antibiotics    - Nephro:  Normal renal function    - GI:  Acute pancreatitis likely secondary to hypertriglyceridemia Alcohol abuse, uncontrolled DM, obesity,   Lipase: 5943  will do U/S liver and gallbladder to rule out gallstones  continue with pain management  Avoid morphine  NPO for now  aggressive IV hydration  Insulin drip  accuchecks q 1 hr  monitor for hypoglycemia  start D5 if sugars decreased     - Heme  CBC looks hemoconcentrated  F/u cbc daily    -Endocrine:  DIabetes : non compliant to metformin.   F/u HBA1C  continue with insulin drip  continue with D5 NS IV fluids      - Prophylaxis       RISK                                                          Points  [  ] Previous VTE                                                3  [  ] Thrombophilia                                             2  [  ] Lower limb paralysis                                   2        (unable to hold up >15 seconds)    [  ] Current Cancer                                             2         (within 6 months)  [ x ] Immobilization > 24 hrs                              1  [ x ] ICU/CCU stay > 24 hours                             1  [ x ] Age > 60                                                         1    IMPROVE VTE Score: 2  lovenox for VTE prophylaxis.    Disposition:  Patient is accepted to ICU

## 2020-02-22 NOTE — H&P ADULT - NSICDXPASTMEDICALHX_GEN_ALL_CORE_FT
PAST MEDICAL HISTORY:  Diabetes     ETOH abuse     History of hypertension     LBP radiating to right leg

## 2020-02-22 NOTE — PROGRESS NOTE ADULT - SUBJECTIVE AND OBJECTIVE BOX
Patient is a 39y old  Male who presents with a chief complaint of Abdominal pain (2020 01:52)      INTERVAL HPI/OVERNIGHT EVENTS: no acute events , pt afebrile , abd pain improved , no N/V     ICU Vital Signs Last 24 Hrs  T(C): 36.8 (2020 05:00), Max: 37.3 (2020 00:38)  T(F): 98.2 (2020 05:00), Max: 99.1 (2020 00:38)  HR: 84 (2020 08:00) (75 - 96)  BP: 118/75 (2020 08:00) (118/75 - 172/122)  BP(mean): 84 (2020 08:00) (84 - 105)  ABP: --  ABP(mean): --  RR: 15 (2020 08:00) (15 - 22)  SpO2: 96% (2020 08:00) (95% - 99%)    I&O's Summary    2020 07:01  -  2020 07:00  --------------------------------------------------------  IN: 1266 mL / OUT: 600 mL / NET: 666 mL          LABS:                        14.4   8.92  )-----------( 148      ( 2020 05:37 )             40.6     22    138  |  104  |  8   ----------------------------<  196<H>  x    |  25  |  0.84    Ca    8.3<L>      2020 09:55  Phos  2.3     22  Mg     1.8     22    TPro  6.0  /  Alb  3.0<L>  /  TBili  0.5  /  DBili  x   /  AST  25  /  ALT  55  /  AlkPhos  73  02-22    PT/INR - ( 2020 21:57 )   PT: 10.5 sec;   INR: 0.95 ratio         PTT - ( 2020 21:57 )  PTT:32.6 sec  Urinalysis Basic - ( 2020 00:52 )    Color: Yellow / Appearance: Clear / S.025 / pH: x  Gluc: x / Ketone: Moderate  / Bili: Negative / Urobili: Negative   Blood: x / Protein: 100 / Nitrite: Negative   Leuk Esterase: Negative / RBC: 0-2 /HPF / WBC 0-2 /HPF   Sq Epi: x / Non Sq Epi: Occasional /HPF / Bacteria: Negative /HPF      CAPILLARY BLOOD GLUCOSE      POCT Blood Glucose.: 165 mg/dL (2020 09:23)  POCT Blood Glucose.: 195 mg/dL (2020 08:10)  POCT Blood Glucose.: 197 mg/dL (2020 07:14)  POCT Blood Glucose.: 234 mg/dL (2020 06:19)  POCT Blood Glucose.: 268 mg/dL (2020 05:34)  POCT Blood Glucose.: 243 mg/dL (2020 04:12)  POCT Blood Glucose.: 270 mg/dL (2020 03:14)  POCT Blood Glucose.: 218 mg/dL (2020 02:12)  POCT Blood Glucose.: 214 mg/dL (2020 01:08)        RADIOLOGY & ADDITIONAL TESTS:    Consultant(s) Notes Reviewed:  [x ] YES  [ ] NO    MEDICATIONS  (STANDING):  chlorhexidine 2% Cloths 1 Application(s) Topical daily  dextrose 10%. 1000 milliLiter(s) (40 mL/Hr) IV Continuous <Continuous>  dextrose 5% + lactated ringers 1000 milliLiter(s) (160 mL/Hr) IV Continuous <Continuous>  enoxaparin Injectable 40 milliGRAM(s) SubCutaneous daily  folic acid 1 milliGRAM(s) Oral daily  insulin regular Infusion 8 Unit(s)/Hr (8 mL/Hr) IV Continuous <Continuous>  lactated ringers Bolus 1000 milliLiter(s) IV Bolus once  pantoprazole  Injectable 40 milliGRAM(s) IV Push daily  senna 2 Tablet(s) Oral at bedtime  thiamine Injectable 100 milliGRAM(s) IntraMuscular daily    MEDICATIONS  (PRN):  acetaminophen   Tablet .. 650 milliGRAM(s) Oral every 6 hours PRN Mild Pain (1 - 3)  aluminum hydroxide/magnesium hydroxide/simethicone Suspension 30 milliLiter(s) Oral every 6 hours PRN Dyspepsia  ketorolac   Injectable 30 milliGRAM(s) IV Push every 8 hours PRN Moderate Pain (4 - 6)  LORazepam   Injectable 1 milliGRAM(s) IV Push every 8 hours PRN CIWA>8  morphine  - Injectable 2 milliGRAM(s) IV Push every 4 hours PRN Severe Pain (7 - 10)      PHYSICAL EXAM:  GENERAL: not in distress   HEAD:  Atraumatic, Normocephalic  EYES: EOMI, PERRLA, conjunctiva and sclera clear  NECK: Supple, No JVD, Normal thyroid, no enlarged nodes  NERVOUS SYSTEM:  Alert & Awake.   CHEST/LUNG: B/L good air entry; No rales, rhonchi, or wheezing  HEART: S1S2 normal, no S3, Regular rate and rhythm; No murmurs  ABDOMEN: Soft, tenderness + , Nondistended; Bowel sounds present  EXTREMITIES:  2+ Peripheral Pulses, No clubbing, cyanosis, or edema  LYMPH: No lymphadenopathy noted  SKIN: No rashes or lesions    Care Discussed with Consultants/Other Providers [ x] YES  [ ] NO

## 2020-02-22 NOTE — PROGRESS NOTE ADULT - ASSESSMENT
39 yr old M with PMH of chronic ETOH abuse (drinks 8-9 cans daily, last intake 3 days ago), chronic nicotine user through vaping, Htn, Type II DM ( stopped taking medications since 1 year), paraspinal hernia of back s/p surgery presented with complain of mid epigastric pain since 1 day. Patient is admitted for ICU acute pancreatitis with hypertriglyceridemia     # Acute pancreatitis  # Acute hypertriglyceridemia  # ETOH abuse  # chronic nicotine through vaping  #Type II DM  #Htn  #Mild transaminitis      - Neuro;  # Alcohol   Patient is alert and oriented,     - Cardiovascular  Hx of Htn: non compliant to medications ( used coreg)  EKG shows NSR, No ST-T wave inversion    - Pulm:  No active issues    - ID:  No signs of infection  No needs of IV antibiotics    - Nephro:  Normal renal function    - GI:  Acute pancreatitis likely secondary to hypertriglyceridemia Alcohol abuse, uncontrolled DM, obesity,   Lipase: 5943  will do U/S liver and gallbladder to rule out gallstones  continue with pain management  Avoid morphine  NPO for now  aggressive IV hydration  Insulin drip  accuchecks q 1 hr  monitor for hypoglycemia  continue D5 with IV fluids    - Heme  CBC looks hemoconcentrated  F/u cbc daily    -Endocrine:  DIabetes : non compliant to metformin.   F/u HBA1C  continue with insulin drip  continue with D5 NS IV fluids      - Prophylaxis       RISK                                                          Points  [  ] Previous VTE                                                3  [  ] Thrombophilia                                             2  [  ] Lower limb paralysis                                   2        (unable to hold up >15 seconds)    [  ] Current Cancer                                             2         (within 6 months)  [ x ] Immobilization > 24 hrs                              1  [ x ] ICU/CCU stay > 24 hours                             1  [ x ] Age > 60                                                         1    IMPROVE VTE Score: 2  lovenox for VTE prophylaxis.    Disposition:  Patient is accepted to ICU 39 yr old M with PMH of chronic ETOH abuse (drinks 8-9 cans daily, last intake 3 days ago), chronic nicotine user through vaping, Htn, Type II DM ( stopped taking medications since 1 year), paraspinal hernia of back s/p surgery presented with complain of mid epigastric pain since 1 day. Patient is admitted for ICU acute pancreatitis with hypertriglyceridemia     # Acute pancreatitis  # Acute hypertriglyceridemia  # ETOH abuse  # chronic nicotine through vaping  #Type II DM  #Htn  #Mild transaminitis      - Neuro;  # Alcohol abuse   - pt currently not in withdrawal   - c/w ativan IV prn as per CIWA     - Cardiovascular  # Htn:    compliant to medications ( used coreg)  BP stable off the meds for now     - Pulm:  No active issues    - ID:  No signs of infection  No needs of IV antibiotics at this tome   - f/u CT a/p     - Nephro:  Normal renal function    - GI:  Acute pancreatitis likely secondary to hypertriglyceridemia Alcohol abuse, uncontrolled DM, obesity,   Lipase trending down   continue with pain management  Avoid morphine  NPO for now  aggressive IV hydration  Insulin drip  accuchecks q 1 hr  monitor for hypoglycemia  continue D5 and D10  with IV fluids to prevent hypogylcemia   -f/u CT a/p to r/o pseudocyst/necrosis   - f/u US liver n GB to r/o GB stones     - Heme  CBC looks hemoconcentrated  F/u cbc daily    -Endocrine:  # DIabetes :   non compliant to metformin.   F/u HBA1C  continue with insulin drip  continue with D5 and D10 NS IV fluids    # Hypertriglyceridemia  - TG trending down   - Cont with insulin drip till TG < 1000   - f/u TG in am    - Endo consult Dr Cordoba       - Prophylaxis       RISK                                                          Points  [  ] Previous VTE                                                3  [  ] Thrombophilia                                             2  [  ] Lower limb paralysis                                   2        (unable to hold up >15 seconds)    [  ] Current Cancer                                             2         (within 6 months)  [ x ] Immobilization > 24 hrs                              1  [ x ] ICU/CCU stay > 24 hours                             1  [ x ] Age > 60                                                         1    IMPROVE VTE Score: 2  lovenox for VTE prophylaxis.    Disposition:  monitor in  ICU

## 2020-02-22 NOTE — CONSULT NOTE ADULT - SUBJECTIVE AND OBJECTIVE BOX
Patient is a 39y old  Male who presents with a chief complaint of Abdominal pain (2020 10:16)      HPI:  Patient is 39 yr old M with PMH of chronic ETOH abuse (drinks 8-9 cans daily, last intake 3 days ago), chronic nicotine user through vaping, Htn, Type II DM ( stopped taking medications since 1 year), paraspinal hernia of back s/p surgery presented with complain of mid epigastric pain since 1 day. Patient states he initially develop intermittent Rt shoulder pain since one week and then developed sudden severe epigastric pain x 1 day. Epigastric pain was cramping, 10/10, progressively worsening, no radiation/ aggravating/ alleviating factors. He denies nausea/ vomiting/ diarrhea, constipation, fever, chills, previous episodes or any other complains. He denies taking any medications at home including OTC drugs. He was diagnosed with Htn and DM type II in , Was prescribed metformin and coreg for DM and Htn but has stopped taking them since 1 year. His diet includes all fatty food including tacos. His last meal was tacos. (2020 01:52)      PAST MEDICAL & SURGICAL HISTORY:  ETOH abuse  Diabetes  History of hypertension  LBP radiating to right leg  S/P lumbar discectomy:          MEDICATIONS  (STANDING):  chlorhexidine 2% Cloths 1 Application(s) Topical daily  dextrose 10%. 1000 milliLiter(s) (40 mL/Hr) IV Continuous <Continuous>  dextrose 5% + lactated ringers 1000 milliLiter(s) (160 mL/Hr) IV Continuous <Continuous>  enoxaparin Injectable 40 milliGRAM(s) SubCutaneous daily  ergocalciferol 97010 Unit(s) Oral <User Schedule>  folic acid 1 milliGRAM(s) Oral daily  insulin regular Infusion 8 Unit(s)/Hr (8 mL/Hr) IV Continuous <Continuous>  pantoprazole  Injectable 40 milliGRAM(s) IV Push daily  potassium chloride   Powder 40 milliEquivalent(s) Oral every 4 hours  senna 2 Tablet(s) Oral at bedtime  thiamine Injectable 100 milliGRAM(s) IntraMuscular daily    MEDICATIONS  (PRN):  acetaminophen   Tablet .. 650 milliGRAM(s) Oral every 6 hours PRN Mild Pain (1 - 3)  aluminum hydroxide/magnesium hydroxide/simethicone Suspension 30 milliLiter(s) Oral every 6 hours PRN Dyspepsia  ketorolac   Injectable 30 milliGRAM(s) IV Push every 8 hours PRN Moderate Pain (4 - 6)  LORazepam   Injectable 1 milliGRAM(s) IV Push every 8 hours PRN CIWA>8  morphine  - Injectable 2 milliGRAM(s) IV Push every 4 hours PRN Severe Pain (7 - 10)      FAMILY HISTORY:  No pertinent family history      SOCIAL HISTORY:      REVIEW OF SYSTEMS:  CONSTITUTIONAL: No fever, weight loss, or fatigue  EYES: No eye pain, visual disturbances, or discharge  ENT:  No difficulty hearing, tinnitus, vertigo; No sinus or throat pain  NECK: No pain or stiffness  RESPIRATORY: No cough, wheezing, chills or hemoptysis; No Shortness of Breath  CARDIOVASCULAR: No chest pain, palpitations, passing out, dizziness, or leg swelling  GASTROINTESTINAL: No abdominal or epigastric pain. No nausea, vomiting, or hematemesis; No diarrhea or constipation. No melena or hematochezia.  GENITOURINARY: No dysuria, frequency, hematuria, or incontinence  NEUROLOGICAL: No headaches, memory loss, loss of strength, numbness, or tremors  SKIN: No itching, burning, rashes, or lesions   LYMPH Nodes: No enlarged glands  ENDOCRINE: No heat or cold intolerance; No hair loss  MUSCULOSKELETAL: No joint pain or swelling; No muscle, back, or extremity pain  PSYCHIATRIC: No depression, anxiety, mood swings, or difficulty sleeping  HEME/LYMPH: No easy bruising, or bleeding gums  ALLERGY AND IMMUNOLOGIC: No hives or eczema	        Vital Signs Last 24 Hrs  T(C): 37.9 (2020 12:00), Max: 37.9 (2020 12:00)  T(F): 100.2 (2020 12:00), Max: 100.2 (2020 12:00)  HR: 94 (2020 15:00) (75 - 105)  BP: 123/70 (2020 15:00) (117/73 - 172/122)  BP(mean): 81 (2020 15:00) (81 - 105)  RR: 20 (2020 15:00) (15 - 27)  SpO2: 97% (2020 15:00) (95% - 99%)      Constitutional:    HEENT: nad    Neck:  No JVD, bruits or thyromegaly    Respiratory:  Clear without rales or rhonchi    Cardiovascular:  RR without murmur, rub or gallop.    Gastrointestinal: Soft without hepatosplenomegaly.    Extremities: without cyanosis, clubbing or edema.    Neurological:  Oriented   x   3   . No gross sensory or motor defects.        LABS:                        14.4   8.92  )-----------( 148      ( 2020 05:37 )             40.6         135  |  102  |  5<L>  ----------------------------<  207<H>  3.3<L>   |  25  |  0.73    Ca    7.8<L>      2020 14:57  Phos  2.3       Mg     1.8         TPro  6.0  /  Alb  3.0<L>  /  TBili  0.5  /  DBili  x   /  AST  25  /  ALT  55  /  AlkPhos  73      CARDIAC MARKERS ( 2020 21:57 )  <0.015 ng/mL / x     / x     / x     / x          PT/INR - ( 2020 21:57 )   PT: 10.5 sec;   INR: 0.95 ratio         PTT - ( 2020 21:57 )  PTT:32.6 sec  Urinalysis Basic - ( 2020 00:52 )    Color: Yellow / Appearance: Clear / S.025 / pH: x  Gluc: x / Ketone: Moderate  / Bili: Negative / Urobili: Negative   Blood: x / Protein: 100 / Nitrite: Negative   Leuk Esterase: Negative / RBC: 0-2 /HPF / WBC 0-2 /HPF   Sq Epi: x / Non Sq Epi: Occasional /HPF / Bacteria: Negative /HPF    Hemoglobin A1C, Whole Blood (20 @ 11:38)    Hemoglobin A1C, Whole Blood: 11.9:       CAPILLARY BLOOD GLUCOSE      POCT Blood Glucose.: 181 mg/dL (2020 16:15)  POCT Blood Glucose.: 193 mg/dL (2020 15:15)  POCT Blood Glucose.: 175 mg/dL (2020 14:19)  POCT Blood Glucose.: 192 mg/dL (2020 13:13)  POCT Blood Glucose.: 175 mg/dL (2020 12:14)  POCT Blood Glucose.: 178 mg/dL (2020 11:29)  POCT Blood Glucose.: 184 mg/dL (2020 11:15)  POCT Blood Glucose.: 170 mg/dL (2020 10:16)  POCT Blood Glucose.: 165 mg/dL (2020 09:23)  POCT Blood Glucose.: 195 mg/dL (2020 08:10)  POCT Blood Glucose.: 197 mg/dL (2020 07:14)  POCT Blood Glucose.: 234 mg/dL (2020 06:19)  POCT Blood Glucose.: 268 mg/dL (2020 05:34)  POCT Blood Glucose.: 243 mg/dL (2020 04:12)  POCT Blood Glucose.: 270 mg/dL (2020 03:14)  POCT Blood Glucose.: 218 mg/dL (2020 02:12)  POCT Blood Glucose.: 214 mg/dL (2020 01:08)      RADIOLOGY & ADDITIONAL STUDIES:

## 2020-02-22 NOTE — H&P ADULT - ASSESSMENT
39 yr old M with PMH of chronic ETOH abuse (drinks 8-9 cans daily, last intake 3 days ago), chronic nicotine user through vaping, Htn, Type II DM ( stopped taking medications since 1 year), paraspinal hernia of back s/p surgery presented with complain of mid epigastric pain since 1 day. Patient is admitted for ICU acute pancreatitis with hypertriglyceridemia     # Acute pancreatitis  # Acute hypertriglyceridemia  # ETOH abuse  # chronic nicotine through vaping  #Type II DM  #Htn  #Mild transaminitis      - Neuro;  Patient is alert and oriented, no active issues    - Cardiovascular  Hx of Htn: non compliant to medications ( used coreg)  EKG shows NSR, No ST-T wave inversion    - Pulm:  No active issues    - ID:  No signs of infection  No needs of IV antibiotics    - Nephro:  Normal renal function    - GI:  Acute pancreatitis likely secondary to hypertriglyceridemia Alcohol abuse, uncontrolled DM, obesity,   Lipase: 5943  will do U/S liver and gallbladder to rule out gallstones  continue with pain management  Avoid morphine  NPO for now  aggressive IV hydration  Insulin drip  accuchecks q 1 hr  monitor for hypoglycemia  start D5 if sugars decreased     - Heme  CBC looks hemoconcentrated  F/u cbc daily    -Endocrine:  DIabetes : non compliant to metformin.   F/u HBA1C  continue with insulin drip  continue with D5 NS IV fluids      - Prophylaxis       RISK                                                          Points  [  ] Previous VTE                                                3  [  ] Thrombophilia                                             2  [  ] Lower limb paralysis                                   2        (unable to hold up >15 seconds)    [  ] Current Cancer                                             2         (within 6 months)  [ x ] Immobilization > 24 hrs                              1  [ x ] ICU/CCU stay > 24 hours                             1  [ x ] Age > 60                                                         1    IMPROVE VTE Score: 2  lovenox for VTE prophylaxis.    Disposition:  Patient is accepted to ICU 39 yr old M with PMH of chronic ETOH abuse (drinks 8-9 cans daily, last intake 3 days ago), chronic nicotine user through vaping, Htn, Type II DM ( stopped taking medications since 1 year), paraspinal hernia of back s/p surgery presented with complain of mid epigastric pain since 1 day. Patient is admitted for ICU acute pancreatitis with hypertriglyceridemia     # Acute pancreatitis  # Acute hypertriglyceridemia  # ETOH abuse  # chronic nicotine through vaping  #Type II DM  #Htn  #Mild transaminitis      - Neuro;  Patient is alert and oriented, no active issues    - Cardiovascular  Hx of Htn: non compliant to medications ( used coreg)  EKG shows NSR, No ST-T wave inversion    - Pulm:  No active issues    - ID:  No signs of infection  No needs of IV antibiotics    - Nephro:  Normal renal function    - GI:  Acute pancreatitis likely secondary to hypertriglyceridemia Alcohol abuse, uncontrolled DM, obesity,   Lipase: 5943  will do U/S liver and gallbladder to rule out gallstones  continue with pain management  Avoid morphine  NPO for now  aggressive IV hydration  Insulin drip  accuchecks q 1 hr  monitor for hypoglycemia  continue D5 with IV fluids    - Heme  CBC looks hemoconcentrated  F/u cbc daily    -Endocrine:  DIabetes : non compliant to metformin.   F/u HBA1C  continue with insulin drip  continue with D5 NS IV fluids      - Prophylaxis       RISK                                                          Points  [  ] Previous VTE                                                3  [  ] Thrombophilia                                             2  [  ] Lower limb paralysis                                   2        (unable to hold up >15 seconds)    [  ] Current Cancer                                             2         (within 6 months)  [ x ] Immobilization > 24 hrs                              1  [ x ] ICU/CCU stay > 24 hours                             1  [ x ] Age > 60                                                         1    IMPROVE VTE Score: 2  lovenox for VTE prophylaxis.    Disposition:  Patient is accepted to ICU

## 2020-02-22 NOTE — H&P ADULT - NSHPPHYSICALEXAM_GEN_ALL_CORE
PHYSICAL EXAM:  GENERAL: NAD, obese  HEAD:  Atraumatic, Normocephalic  EYES:  conjunctiva and sclera clear  NECK: Supple, No JVD, Normal thyroid  CHEST/LUNG: Clear to auscultation. Clear to percussion bilaterally; No rales, rhonchi, wheezing, or rubs  HEART: Regular rate and rhythm; No murmurs, rubs, or gallops  ABDOMEN: Epigastric tenderness, Soft, Nondistended; Bowel sounds present  NERVOUS SYSTEM:  Alert & Oriented X3,    EXTREMITIES:  2+ Peripheral Pulses, No clubbing, cyanosis, or edema  SKIN: warm dry

## 2020-02-23 DIAGNOSIS — K85.20 ALCOHOL INDUCED ACUTE PANCREATITIS WITHOUT NECROSIS OR INFECTION: ICD-10-CM

## 2020-02-23 DIAGNOSIS — I10 ESSENTIAL (PRIMARY) HYPERTENSION: ICD-10-CM

## 2020-02-23 DIAGNOSIS — R74.0 NONSPECIFIC ELEVATION OF LEVELS OF TRANSAMINASE AND LACTIC ACID DEHYDROGENASE [LDH]: ICD-10-CM

## 2020-02-23 DIAGNOSIS — F10.10 ALCOHOL ABUSE, UNCOMPLICATED: ICD-10-CM

## 2020-02-23 DIAGNOSIS — E11.65 TYPE 2 DIABETES MELLITUS WITH HYPERGLYCEMIA: ICD-10-CM

## 2020-02-23 LAB
ALBUMIN SERPL ELPH-MCNC: 2.7 G/DL — LOW (ref 3.5–5)
ALP SERPL-CCNC: 70 U/L — SIGNIFICANT CHANGE UP (ref 40–120)
ALT FLD-CCNC: 33 U/L DA — SIGNIFICANT CHANGE UP (ref 10–60)
ANION GAP SERPL CALC-SCNC: 5 MMOL/L — SIGNIFICANT CHANGE UP (ref 5–17)
APPEARANCE UR: CLEAR — SIGNIFICANT CHANGE UP
AST SERPL-CCNC: 15 U/L — SIGNIFICANT CHANGE UP (ref 10–40)
BACTERIA # UR AUTO: ABNORMAL /HPF
BASOPHILS # BLD AUTO: 0.05 K/UL — SIGNIFICANT CHANGE UP (ref 0–0.2)
BASOPHILS NFR BLD AUTO: 0.5 % — SIGNIFICANT CHANGE UP (ref 0–2)
BILIRUB SERPL-MCNC: 0.6 MG/DL — SIGNIFICANT CHANGE UP (ref 0.2–1.2)
BILIRUB UR-MCNC: NEGATIVE — SIGNIFICANT CHANGE UP
BUN SERPL-MCNC: 3 MG/DL — LOW (ref 7–18)
CALCIUM SERPL-MCNC: 8.2 MG/DL — LOW (ref 8.4–10.5)
CHLORIDE SERPL-SCNC: 107 MMOL/L — SIGNIFICANT CHANGE UP (ref 96–108)
CO2 SERPL-SCNC: 25 MMOL/L — SIGNIFICANT CHANGE UP (ref 22–31)
COLOR SPEC: YELLOW — SIGNIFICANT CHANGE UP
CREAT SERPL-MCNC: 0.73 MG/DL — SIGNIFICANT CHANGE UP (ref 0.5–1.3)
DIFF PNL FLD: NEGATIVE — SIGNIFICANT CHANGE UP
EOSINOPHIL # BLD AUTO: 0.16 K/UL — SIGNIFICANT CHANGE UP (ref 0–0.5)
EOSINOPHIL NFR BLD AUTO: 1.5 % — SIGNIFICANT CHANGE UP (ref 0–6)
EPI CELLS # UR: SIGNIFICANT CHANGE UP /HPF
GLUCOSE BLDC GLUCOMTR-MCNC: 149 MG/DL — HIGH (ref 70–99)
GLUCOSE BLDC GLUCOMTR-MCNC: 152 MG/DL — HIGH (ref 70–99)
GLUCOSE BLDC GLUCOMTR-MCNC: 162 MG/DL — HIGH (ref 70–99)
GLUCOSE BLDC GLUCOMTR-MCNC: 163 MG/DL — HIGH (ref 70–99)
GLUCOSE BLDC GLUCOMTR-MCNC: 165 MG/DL — HIGH (ref 70–99)
GLUCOSE BLDC GLUCOMTR-MCNC: 165 MG/DL — HIGH (ref 70–99)
GLUCOSE BLDC GLUCOMTR-MCNC: 173 MG/DL — HIGH (ref 70–99)
GLUCOSE BLDC GLUCOMTR-MCNC: 208 MG/DL — HIGH (ref 70–99)
GLUCOSE BLDC GLUCOMTR-MCNC: 208 MG/DL — HIGH (ref 70–99)
GLUCOSE SERPL-MCNC: 178 MG/DL — HIGH (ref 70–99)
GLUCOSE UR QL: NEGATIVE — SIGNIFICANT CHANGE UP
HCT VFR BLD CALC: 43.5 % — SIGNIFICANT CHANGE UP (ref 39–50)
HGB BLD-MCNC: 15.1 G/DL — SIGNIFICANT CHANGE UP (ref 13–17)
IMM GRANULOCYTES NFR BLD AUTO: 0.7 % — SIGNIFICANT CHANGE UP (ref 0–1.5)
KETONES UR-MCNC: NEGATIVE — SIGNIFICANT CHANGE UP
LEUKOCYTE ESTERASE UR-ACNC: NEGATIVE — SIGNIFICANT CHANGE UP
LIDOCAIN IGE QN: 676 U/L — HIGH (ref 73–393)
LYMPHOCYTES # BLD AUTO: 1.18 K/UL — SIGNIFICANT CHANGE UP (ref 1–3.3)
LYMPHOCYTES # BLD AUTO: 10.8 % — LOW (ref 13–44)
MAGNESIUM SERPL-MCNC: 1.7 MG/DL — SIGNIFICANT CHANGE UP (ref 1.6–2.6)
MCHC RBC-ENTMCNC: 31.9 PG — SIGNIFICANT CHANGE UP (ref 27–34)
MCHC RBC-ENTMCNC: 34.7 GM/DL — SIGNIFICANT CHANGE UP (ref 32–36)
MCV RBC AUTO: 91.8 FL — SIGNIFICANT CHANGE UP (ref 80–100)
MONOCYTES # BLD AUTO: 0.81 K/UL — SIGNIFICANT CHANGE UP (ref 0–0.9)
MONOCYTES NFR BLD AUTO: 7.4 % — SIGNIFICANT CHANGE UP (ref 2–14)
NEUTROPHILS # BLD AUTO: 8.62 K/UL — HIGH (ref 1.8–7.4)
NEUTROPHILS NFR BLD AUTO: 79.1 % — HIGH (ref 43–77)
NITRITE UR-MCNC: NEGATIVE — SIGNIFICANT CHANGE UP
NRBC # BLD: 0 /100 WBCS — SIGNIFICANT CHANGE UP (ref 0–0)
PH UR: 8 — SIGNIFICANT CHANGE UP (ref 5–8)
PHOSPHATE SERPL-MCNC: 2.4 MG/DL — LOW (ref 2.5–4.5)
PLATELET # BLD AUTO: 160 K/UL — SIGNIFICANT CHANGE UP (ref 150–400)
POTASSIUM SERPL-MCNC: 3.6 MMOL/L — SIGNIFICANT CHANGE UP (ref 3.5–5.3)
POTASSIUM SERPL-SCNC: 3.6 MMOL/L — SIGNIFICANT CHANGE UP (ref 3.5–5.3)
PROT SERPL-MCNC: 5.8 G/DL — LOW (ref 6–8.3)
PROT UR-MCNC: 15
RBC # BLD: 4.74 M/UL — SIGNIFICANT CHANGE UP (ref 4.2–5.8)
RBC # FLD: 12.9 % — SIGNIFICANT CHANGE UP (ref 10.3–14.5)
RBC CASTS # UR COMP ASSIST: SIGNIFICANT CHANGE UP /HPF (ref 0–2)
SODIUM SERPL-SCNC: 137 MMOL/L — SIGNIFICANT CHANGE UP (ref 135–145)
SP GR SPEC: 1.01 — SIGNIFICANT CHANGE UP (ref 1.01–1.02)
TRIGL SERPL-MCNC: 831 MG/DL — HIGH (ref 10–149)
UROBILINOGEN FLD QL: NEGATIVE — SIGNIFICANT CHANGE UP
WBC # BLD: 10.9 K/UL — HIGH (ref 3.8–10.5)
WBC # FLD AUTO: 10.9 K/UL — HIGH (ref 3.8–10.5)
WBC UR QL: SIGNIFICANT CHANGE UP /HPF (ref 0–5)

## 2020-02-23 PROCEDURE — 99232 SBSQ HOSP IP/OBS MODERATE 35: CPT

## 2020-02-23 PROCEDURE — 71045 X-RAY EXAM CHEST 1 VIEW: CPT | Mod: 26

## 2020-02-23 RX ORDER — CEFTRIAXONE 500 MG/1
1000 INJECTION, POWDER, FOR SOLUTION INTRAMUSCULAR; INTRAVENOUS EVERY 12 HOURS
Refills: 0 | Status: DISCONTINUED | OUTPATIENT
Start: 2020-02-23 | End: 2020-02-23

## 2020-02-23 RX ORDER — CEFTRIAXONE 500 MG/1
1000 INJECTION, POWDER, FOR SOLUTION INTRAMUSCULAR; INTRAVENOUS ONCE
Refills: 0 | Status: COMPLETED | OUTPATIENT
Start: 2020-02-23 | End: 2020-02-23

## 2020-02-23 RX ORDER — INSULIN LISPRO 100/ML
VIAL (ML) SUBCUTANEOUS AT BEDTIME
Refills: 0 | Status: DISCONTINUED | OUTPATIENT
Start: 2020-02-23 | End: 2020-02-25

## 2020-02-23 RX ORDER — INSULIN HUMAN 100 [IU]/ML
8 INJECTION, SOLUTION SUBCUTANEOUS
Qty: 100 | Refills: 0 | Status: DISCONTINUED | OUTPATIENT
Start: 2020-02-23 | End: 2020-02-23

## 2020-02-23 RX ORDER — METRONIDAZOLE 500 MG
500 TABLET ORAL EVERY 8 HOURS
Refills: 0 | Status: DISCONTINUED | OUTPATIENT
Start: 2020-02-23 | End: 2020-02-25

## 2020-02-23 RX ORDER — METRONIDAZOLE 500 MG
TABLET ORAL
Refills: 0 | Status: DISCONTINUED | OUTPATIENT
Start: 2020-02-23 | End: 2020-02-25

## 2020-02-23 RX ORDER — INSULIN GLARGINE 100 [IU]/ML
10 INJECTION, SOLUTION SUBCUTANEOUS AT BEDTIME
Refills: 0 | Status: DISCONTINUED | OUTPATIENT
Start: 2020-02-23 | End: 2020-02-25

## 2020-02-23 RX ORDER — INSULIN LISPRO 100/ML
VIAL (ML) SUBCUTANEOUS
Refills: 0 | Status: DISCONTINUED | OUTPATIENT
Start: 2020-02-23 | End: 2020-02-25

## 2020-02-23 RX ORDER — SODIUM CHLORIDE 9 MG/ML
1000 INJECTION, SOLUTION INTRAVENOUS
Refills: 0 | Status: DISCONTINUED | OUTPATIENT
Start: 2020-02-23 | End: 2020-02-23

## 2020-02-23 RX ORDER — METRONIDAZOLE 500 MG
500 TABLET ORAL ONCE
Refills: 0 | Status: COMPLETED | OUTPATIENT
Start: 2020-02-23 | End: 2020-02-23

## 2020-02-23 RX ORDER — INSULIN LISPRO 100/ML
4 VIAL (ML) SUBCUTANEOUS
Refills: 0 | Status: DISCONTINUED | OUTPATIENT
Start: 2020-02-23 | End: 2020-02-25

## 2020-02-23 RX ORDER — CEFTRIAXONE 500 MG/1
INJECTION, POWDER, FOR SOLUTION INTRAMUSCULAR; INTRAVENOUS
Refills: 0 | Status: DISCONTINUED | OUTPATIENT
Start: 2020-02-23 | End: 2020-02-25

## 2020-02-23 RX ORDER — CEFTRIAXONE 500 MG/1
1000 INJECTION, POWDER, FOR SOLUTION INTRAMUSCULAR; INTRAVENOUS EVERY 24 HOURS
Refills: 0 | Status: DISCONTINUED | OUTPATIENT
Start: 2020-02-24 | End: 2020-02-25

## 2020-02-23 RX ADMIN — Medication 2: at 17:14

## 2020-02-23 RX ADMIN — Medication 650 MILLIGRAM(S): at 15:31

## 2020-02-23 RX ADMIN — Medication 1 MILLIGRAM(S): at 11:53

## 2020-02-23 RX ADMIN — Medication 100 MILLIGRAM(S): at 17:14

## 2020-02-23 RX ADMIN — Medication 30 MILLIGRAM(S): at 05:45

## 2020-02-23 RX ADMIN — Medication 30 MILLIGRAM(S): at 06:00

## 2020-02-23 RX ADMIN — Medication 100 MILLIGRAM(S): at 11:53

## 2020-02-23 RX ADMIN — Medication 4 UNIT(S): at 08:19

## 2020-02-23 RX ADMIN — ERGOCALCIFEROL 50000 UNIT(S): 1.25 CAPSULE ORAL at 10:16

## 2020-02-23 RX ADMIN — SODIUM CHLORIDE 160 MILLILITER(S): 9 INJECTION, SOLUTION INTRAVENOUS at 02:22

## 2020-02-23 RX ADMIN — SODIUM CHLORIDE 160 MILLILITER(S): 9 INJECTION, SOLUTION INTRAVENOUS at 06:27

## 2020-02-23 RX ADMIN — Medication 600 MILLIGRAM(S): at 05:29

## 2020-02-23 RX ADMIN — MORPHINE SULFATE 2 MILLIGRAM(S): 50 CAPSULE, EXTENDED RELEASE ORAL at 06:26

## 2020-02-23 RX ADMIN — INSULIN GLARGINE 10 UNIT(S): 100 INJECTION, SOLUTION SUBCUTANEOUS at 21:28

## 2020-02-23 RX ADMIN — SENNA PLUS 2 TABLET(S): 8.6 TABLET ORAL at 21:29

## 2020-02-23 RX ADMIN — Medication 650 MILLIGRAM(S): at 17:23

## 2020-02-23 RX ADMIN — Medication 2: at 08:19

## 2020-02-23 RX ADMIN — Medication 4 UNIT(S): at 11:54

## 2020-02-23 RX ADMIN — Medication 100 MILLIGRAM(S): at 21:29

## 2020-02-23 RX ADMIN — PANTOPRAZOLE SODIUM 40 MILLIGRAM(S): 20 TABLET, DELAYED RELEASE ORAL at 11:54

## 2020-02-23 RX ADMIN — CEFTRIAXONE 100 MILLIGRAM(S): 500 INJECTION, POWDER, FOR SOLUTION INTRAMUSCULAR; INTRAVENOUS at 17:11

## 2020-02-23 RX ADMIN — Medication 600 MILLIGRAM(S): at 17:15

## 2020-02-23 RX ADMIN — INSULIN HUMAN 8 UNIT(S)/HR: 100 INJECTION, SOLUTION SUBCUTANEOUS at 02:20

## 2020-02-23 RX ADMIN — MORPHINE SULFATE 2 MILLIGRAM(S): 50 CAPSULE, EXTENDED RELEASE ORAL at 06:41

## 2020-02-23 RX ADMIN — MORPHINE SULFATE 2 MILLIGRAM(S): 50 CAPSULE, EXTENDED RELEASE ORAL at 02:21

## 2020-02-23 RX ADMIN — MORPHINE SULFATE 2 MILLIGRAM(S): 50 CAPSULE, EXTENDED RELEASE ORAL at 02:36

## 2020-02-23 RX ADMIN — ENOXAPARIN SODIUM 40 MILLIGRAM(S): 100 INJECTION SUBCUTANEOUS at 11:54

## 2020-02-23 RX ADMIN — Medication 4 UNIT(S): at 17:15

## 2020-02-23 NOTE — PROGRESS NOTE ADULT - SUBJECTIVE AND OBJECTIVE BOX
Patient is a 39y old  Male who presents with a chief complaint of Abdominal pain (2020 16:30)      INTERVAL HPI/OVERNIGHT EVENTS:  - Abdominal pain controlled with analgesic  - TG trending down on insulin drip    ICU Vital Signs Last 24 Hrs  T(C): 37.6 (2020 20:57), Max: 38.4 (2020 17:00)  T(F): 99.6 (2020 20:57), Max: 101.2 (2020 17:00)  HR: 83 (2020 23:00) (83 - 107)  BP: 110/70 (2020 23:00) (110/70 - 154/92)  BP(mean): 80 (2020 23:00) (80 - 105)  ABP: --  ABP(mean): --  RR: 16 (2020 23:00) (15 - 27)  SpO2: 96% (2020 23:00) (95% - 99%)    I&O's Summary    2020 07:01  -  2020 07:00  --------------------------------------------------------  IN: 1266 mL / OUT: 600 mL / NET: 666 mL    2020 07:01  -  2020 00:27  --------------------------------------------------------  IN: 3910 mL / OUT: 1600 mL / NET: 2310 mL          LABS:                        14.4   8.92  )-----------( 148      ( 2020 05:37 )             40.6     02-22    136  |  107  |  4<L>  ----------------------------<  244<H>  4.1   |  23  |  0.83    Ca    7.8<L>      2020 20:33  Phos  1.5     -  Mg     1.6     -    TPro  6.0  /  Alb  3.0<L>  /  TBili  0.5  /  DBili  x   /  AST  25  /  ALT  55  /  AlkPhos  73  -22    PT/INR - ( 2020 21:57 )   PT: 10.5 sec;   INR: 0.95 ratio         PTT - ( 2020 21:57 )  PTT:32.6 sec  Urinalysis Basic - ( 2020 00:52 )    Color: Yellow / Appearance: Clear / S.025 / pH: x  Gluc: x / Ketone: Moderate  / Bili: Negative / Urobili: Negative   Blood: x / Protein: 100 / Nitrite: Negative   Leuk Esterase: Negative / RBC: 0-2 /HPF / WBC 0-2 /HPF   Sq Epi: x / Non Sq Epi: Occasional /HPF / Bacteria: Negative /HPF      CAPILLARY BLOOD GLUCOSE      POCT Blood Glucose.: 152 mg/dL (2020 00:16)  POCT Blood Glucose.: 176 mg/dL (2020 23:10)  POCT Blood Glucose.: 180 mg/dL (2020 22:21)  POCT Blood Glucose.: 201 mg/dL (2020 21:32)  POCT Blood Glucose.: 245 mg/dL (2020 20:29)  POCT Blood Glucose.: 207 mg/dL (2020 19:20)  POCT Blood Glucose.: 191 mg/dL (2020 18:31)  POCT Blood Glucose.: 169 mg/dL (2020 17:09)  POCT Blood Glucose.: 181 mg/dL (2020 16:15)  POCT Blood Glucose.: 193 mg/dL (2020 15:15)  POCT Blood Glucose.: 175 mg/dL (2020 14:19)  POCT Blood Glucose.: 192 mg/dL (2020 13:13)  POCT Blood Glucose.: 175 mg/dL (2020 12:14)  POCT Blood Glucose.: 178 mg/dL (2020 11:29)  POCT Blood Glucose.: 184 mg/dL (2020 11:15)  POCT Blood Glucose.: 170 mg/dL (2020 10:16)  POCT Blood Glucose.: 165 mg/dL (2020 09:23)  POCT Blood Glucose.: 195 mg/dL (2020 08:10)  POCT Blood Glucose.: 197 mg/dL (2020 07:14)  POCT Blood Glucose.: 234 mg/dL (2020 06:19)  POCT Blood Glucose.: 268 mg/dL (2020 05:34)  POCT Blood Glucose.: 243 mg/dL (2020 04:12)  POCT Blood Glucose.: 270 mg/dL (2020 03:14)  POCT Blood Glucose.: 218 mg/dL (2020 02:12)  POCT Blood Glucose.: 214 mg/dL (2020 01:08)        RADIOLOGY & ADDITIONAL TESTS:    Consultant(s) Notes Reviewed:  [x ] YES  [ ] NO    MEDICATIONS  (STANDING):  chlorhexidine 2% Cloths 1 Application(s) Topical daily  dextrose 10%. 1000 milliLiter(s) (40 mL/Hr) IV Continuous <Continuous>  dextrose 5% + lactated ringers 1000 milliLiter(s) (160 mL/Hr) IV Continuous <Continuous>  enoxaparin Injectable 40 milliGRAM(s) SubCutaneous daily  ergocalciferol 52361 Unit(s) Oral <User Schedule>  folic acid 1 milliGRAM(s) Oral daily  gemfibrozil 600 milliGRAM(s) Oral two times a day  influenza   Vaccine 0.5 milliLiter(s) IntraMuscular once  insulin regular Infusion 8 Unit(s)/Hr (8 mL/Hr) IV Continuous <Continuous>  pantoprazole  Injectable 40 milliGRAM(s) IV Push daily  senna 2 Tablet(s) Oral at bedtime  thiamine Injectable 100 milliGRAM(s) IntraMuscular daily    MEDICATIONS  (PRN):  acetaminophen   Tablet .. 650 milliGRAM(s) Oral every 6 hours PRN Temp greater or equal to 38C (100.4F), Mild Pain (1 - 3)  aluminum hydroxide/magnesium hydroxide/simethicone Suspension 30 milliLiter(s) Oral every 6 hours PRN Dyspepsia  ketorolac   Injectable 30 milliGRAM(s) IV Push every 8 hours PRN Moderate Pain (4 - 6)  LORazepam   Injectable 1 milliGRAM(s) IV Push every 8 hours PRN CIWA>8  morphine  - Injectable 2 milliGRAM(s) IV Push every 4 hours PRN Severe Pain (7 - 10)      PHYSICAL EXAM:  GENERAL: not in distress   HEAD:  Atraumatic, Normocephalic  EYES: EOMI, PERRLA, conjunctiva and sclera clear  NECK: Supple, No JVD, Normal thyroid, no enlarged nodes  NERVOUS SYSTEM:  Alert & Awake.   CHEST/LUNG: B/L good air entry; No rales, rhonchi, or wheezing  HEART: S1S2 normal, no S3, Regular rate and rhythm; No murmurs  ABDOMEN: Soft, tenderness + , Nondistended; Bowel sounds present  EXTREMITIES:  2+ Peripheral Pulses, No clubbing, cyanosis, or edema  LYMPH: No lymphadenopathy noted  SKIN: No rashes or lesions    Care Discussed with Consultants/Other Providers [ x] YES  [ ] NO Patient is a 39y old  Male who presents with a chief complaint of Abdominal pain (2020 16:30)      INTERVAL HPI/OVERNIGHT EVENTS:  - Abdominal pain controlled with analgesic  - TG trending down on insulin drip- 831 this am    ICU Vital Signs Last 24 Hrs  T(C): 37.6 (2020 20:57), Max: 38.4 (2020 17:00)  T(F): 99.6 (2020 20:57), Max: 101.2 (2020 17:00)  HR: 83 (2020 23:00) (83 - 107)  BP: 110/70 (2020 23:00) (110/70 - 154/92)  BP(mean): 80 (2020 23:00) (80 - 105)  ABP: --  ABP(mean): --  RR: 16 (2020 23:00) (15 - 27)  SpO2: 96% (2020 23:00) (95% - 99%)    I&O's Summary    2020 07:01  -  2020 07:00  --------------------------------------------------------  IN: 1266 mL / OUT: 600 mL / NET: 666 mL    2020 07:01  -  2020 00:27  --------------------------------------------------------  IN: 3910 mL / OUT: 1600 mL / NET: 2310 mL          LABS:                        14.4   8.92  )-----------( 148      ( 2020 05:37 )             40.6     02    136  |  107  |  4<L>  ----------------------------<  244<H>  4.1   |  23  |  0.83    Ca    7.8<L>      2020 20:33  Phos  1.5     -  Mg     1.6     -    TPro  6.0  /  Alb  3.0<L>  /  TBili  0.5  /  DBili  x   /  AST  25  /  ALT  55  /  AlkPhos  73  02-22    PT/INR - ( 2020 21:57 )   PT: 10.5 sec;   INR: 0.95 ratio         PTT - ( 2020 21:57 )  PTT:32.6 sec  Urinalysis Basic - ( 2020 00:52 )    Color: Yellow / Appearance: Clear / S.025 / pH: x  Gluc: x / Ketone: Moderate  / Bili: Negative / Urobili: Negative   Blood: x / Protein: 100 / Nitrite: Negative   Leuk Esterase: Negative / RBC: 0-2 /HPF / WBC 0-2 /HPF   Sq Epi: x / Non Sq Epi: Occasional /HPF / Bacteria: Negative /HPF      CAPILLARY BLOOD GLUCOSE      POCT Blood Glucose.: 152 mg/dL (2020 00:16)  POCT Blood Glucose.: 176 mg/dL (2020 23:10)  POCT Blood Glucose.: 180 mg/dL (2020 22:21)  POCT Blood Glucose.: 201 mg/dL (2020 21:32)  POCT Blood Glucose.: 245 mg/dL (2020 20:29)  POCT Blood Glucose.: 207 mg/dL (2020 19:20)  POCT Blood Glucose.: 191 mg/dL (2020 18:31)  POCT Blood Glucose.: 169 mg/dL (2020 17:09)  POCT Blood Glucose.: 181 mg/dL (2020 16:15)  POCT Blood Glucose.: 193 mg/dL (2020 15:15)  POCT Blood Glucose.: 175 mg/dL (2020 14:19)  POCT Blood Glucose.: 192 mg/dL (2020 13:13)  POCT Blood Glucose.: 175 mg/dL (2020 12:14)  POCT Blood Glucose.: 178 mg/dL (2020 11:29)  POCT Blood Glucose.: 184 mg/dL (2020 11:15)  POCT Blood Glucose.: 170 mg/dL (2020 10:16)  POCT Blood Glucose.: 165 mg/dL (2020 09:23)  POCT Blood Glucose.: 195 mg/dL (2020 08:10)  POCT Blood Glucose.: 197 mg/dL (2020 07:14)  POCT Blood Glucose.: 234 mg/dL (2020 06:19)  POCT Blood Glucose.: 268 mg/dL (2020 05:34)  POCT Blood Glucose.: 243 mg/dL (2020 04:12)  POCT Blood Glucose.: 270 mg/dL (2020 03:14)  POCT Blood Glucose.: 218 mg/dL (2020 02:12)  POCT Blood Glucose.: 214 mg/dL (2020 01:08)        RADIOLOGY & ADDITIONAL TESTS:    Consultant(s) Notes Reviewed:  [x ] YES  [ ] NO    MEDICATIONS  (STANDING):  chlorhexidine 2% Cloths 1 Application(s) Topical daily  dextrose 10%. 1000 milliLiter(s) (40 mL/Hr) IV Continuous <Continuous>  dextrose 5% + lactated ringers 1000 milliLiter(s) (160 mL/Hr) IV Continuous <Continuous>  enoxaparin Injectable 40 milliGRAM(s) SubCutaneous daily  ergocalciferol 02731 Unit(s) Oral <User Schedule>  folic acid 1 milliGRAM(s) Oral daily  gemfibrozil 600 milliGRAM(s) Oral two times a day  influenza   Vaccine 0.5 milliLiter(s) IntraMuscular once  insulin regular Infusion 8 Unit(s)/Hr (8 mL/Hr) IV Continuous <Continuous>  pantoprazole  Injectable 40 milliGRAM(s) IV Push daily  senna 2 Tablet(s) Oral at bedtime  thiamine Injectable 100 milliGRAM(s) IntraMuscular daily    MEDICATIONS  (PRN):  acetaminophen   Tablet .. 650 milliGRAM(s) Oral every 6 hours PRN Temp greater or equal to 38C (100.4F), Mild Pain (1 - 3)  aluminum hydroxide/magnesium hydroxide/simethicone Suspension 30 milliLiter(s) Oral every 6 hours PRN Dyspepsia  ketorolac   Injectable 30 milliGRAM(s) IV Push every 8 hours PRN Moderate Pain (4 - 6)  LORazepam   Injectable 1 milliGRAM(s) IV Push every 8 hours PRN CIWA>8  morphine  - Injectable 2 milliGRAM(s) IV Push every 4 hours PRN Severe Pain (7 - 10)      PHYSICAL EXAM:  GENERAL: not in distress   HEAD:  Atraumatic, Normocephalic  EYES: EOMI, PERRLA, conjunctiva and sclera clear  NECK: Supple, No JVD, Normal thyroid, no enlarged nodes  NERVOUS SYSTEM:  Alert & Awake.   CHEST/LUNG: B/L good air entry; No rales, rhonchi, or wheezing  HEART: S1S2 normal, no S3, Regular rate and rhythm; No murmurs  ABDOMEN: Soft, tenderness + , Nondistended; Bowel sounds present  EXTREMITIES:  2+ Peripheral Pulses, No clubbing, cyanosis, or edema  LYMPH: No lymphadenopathy noted  SKIN: No rashes or lesions    Care Discussed with Consultants/Other Providers [ x] YES  [ ] NO Patient is a 39y old  Male who presents with a chief complaint of Abdominal pain (2020 16:30)      INTERVAL HPI/OVERNIGHT EVENTS:  - Abdominal pain controlled with analgesic  - TG trending down on insulin drip- 831 this am  - stared clear diet    ICU Vital Signs Last 24 Hrs  T(C): 37.6 (2020 20:57), Max: 38.4 (2020 17:00)  T(F): 99.6 (2020 20:57), Max: 101.2 (2020 17:00)  HR: 83 (2020 23:00) (83 - 107)  BP: 110/70 (2020 23:00) (110/70 - 154/92)  BP(mean): 80 (:00) (80 - 105)  ABP: --  ABP(mean): --  RR: 16 (2020 23:00) (15 - 27)  SpO2: 96% (2020 23:00) (95% - 99%)    I&O's Summary    2020 07:  -  2020 07:00  --------------------------------------------------------  IN: 1266 mL / OUT: 600 mL / NET: 666 mL    2020 07:01  -  2020 00:27  --------------------------------------------------------  IN: 3910 mL / OUT: 1600 mL / NET: 2310 mL          LABS:                        14.4   8.92  )-----------( 148      ( 2020 05:37 )             40.6     02    136  |  107  |  4<L>  ----------------------------<  244<H>  4.1   |  23  |  0.83    Ca    7.8<L>      2020 20:33  Phos  1.5       Mg     1.6     -    TPro  6.0  /  Alb  3.0<L>  /  TBili  0.5  /  DBili  x   /  AST  25  /  ALT  55  /  AlkPhos  73  -22    PT/INR - ( 2020 21:57 )   PT: 10.5 sec;   INR: 0.95 ratio         PTT - ( 2020 21:57 )  PTT:32.6 sec  Urinalysis Basic - ( 2020 00:52 )    Color: Yellow / Appearance: Clear / S.025 / pH: x  Gluc: x / Ketone: Moderate  / Bili: Negative / Urobili: Negative   Blood: x / Protein: 100 / Nitrite: Negative   Leuk Esterase: Negative / RBC: 0-2 /HPF / WBC 0-2 /HPF   Sq Epi: x / Non Sq Epi: Occasional /HPF / Bacteria: Negative /HPF      CAPILLARY BLOOD GLUCOSE      POCT Blood Glucose.: 152 mg/dL (2020 00:16)  POCT Blood Glucose.: 176 mg/dL (2020 23:10)  POCT Blood Glucose.: 180 mg/dL (2020 22:21)  POCT Blood Glucose.: 201 mg/dL (2020 21:32)  POCT Blood Glucose.: 245 mg/dL (2020 20:29)  POCT Blood Glucose.: 207 mg/dL (2020 19:20)  POCT Blood Glucose.: 191 mg/dL (2020 18:31)  POCT Blood Glucose.: 169 mg/dL (2020 17:09)  POCT Blood Glucose.: 181 mg/dL (2020 16:15)  POCT Blood Glucose.: 193 mg/dL (2020 15:15)  POCT Blood Glucose.: 175 mg/dL (2020 14:19)  POCT Blood Glucose.: 192 mg/dL (2020 13:13)  POCT Blood Glucose.: 175 mg/dL (2020 12:14)  POCT Blood Glucose.: 178 mg/dL (2020 11:29)  POCT Blood Glucose.: 184 mg/dL (2020 11:15)  POCT Blood Glucose.: 170 mg/dL (2020 10:16)  POCT Blood Glucose.: 165 mg/dL (2020 09:23)  POCT Blood Glucose.: 195 mg/dL (2020 08:10)  POCT Blood Glucose.: 197 mg/dL (2020 07:14)  POCT Blood Glucose.: 234 mg/dL (2020 06:19)  POCT Blood Glucose.: 268 mg/dL (2020 05:34)  POCT Blood Glucose.: 243 mg/dL (2020 04:12)  POCT Blood Glucose.: 270 mg/dL (2020 03:14)  POCT Blood Glucose.: 218 mg/dL (2020 02:12)  POCT Blood Glucose.: 214 mg/dL (2020 01:08)        RADIOLOGY & ADDITIONAL TESTS:    Consultant(s) Notes Reviewed:  [x ] YES  [ ] NO    MEDICATIONS  (STANDING):  chlorhexidine 2% Cloths 1 Application(s) Topical daily  dextrose 10%. 1000 milliLiter(s) (40 mL/Hr) IV Continuous <Continuous>  dextrose 5% + lactated ringers 1000 milliLiter(s) (160 mL/Hr) IV Continuous <Continuous>  enoxaparin Injectable 40 milliGRAM(s) SubCutaneous daily  ergocalciferol 35241 Unit(s) Oral <User Schedule>  folic acid 1 milliGRAM(s) Oral daily  gemfibrozil 600 milliGRAM(s) Oral two times a day  influenza   Vaccine 0.5 milliLiter(s) IntraMuscular once  insulin regular Infusion 8 Unit(s)/Hr (8 mL/Hr) IV Continuous <Continuous>  pantoprazole  Injectable 40 milliGRAM(s) IV Push daily  senna 2 Tablet(s) Oral at bedtime  thiamine Injectable 100 milliGRAM(s) IntraMuscular daily    MEDICATIONS  (PRN):  acetaminophen   Tablet .. 650 milliGRAM(s) Oral every 6 hours PRN Temp greater or equal to 38C (100.4F), Mild Pain (1 - 3)  aluminum hydroxide/magnesium hydroxide/simethicone Suspension 30 milliLiter(s) Oral every 6 hours PRN Dyspepsia  ketorolac   Injectable 30 milliGRAM(s) IV Push every 8 hours PRN Moderate Pain (4 - 6)  LORazepam   Injectable 1 milliGRAM(s) IV Push every 8 hours PRN CIWA>8  morphine  - Injectable 2 milliGRAM(s) IV Push every 4 hours PRN Severe Pain (7 - 10)      PHYSICAL EXAM:  GENERAL: not in distress   HEAD:  Atraumatic, Normocephalic  EYES: EOMI, PERRLA, conjunctiva and sclera clear  NECK: Supple, No JVD, Normal thyroid, no enlarged nodes  NERVOUS SYSTEM:  Alert & Awake.   CHEST/LUNG: B/L good air entry; No rales, rhonchi, or wheezing  HEART: S1S2 normal, no S3, Regular rate and rhythm; No murmurs  ABDOMEN: Soft, tenderness + in R side, Nondistended; Bowel sounds present  EXTREMITIES:  2+ Peripheral Pulses, No clubbing, cyanosis, or edema  LYMPH: No lymphadenopathy noted  SKIN: No rashes or lesions    Care Discussed with Consultants/Other Providers [ x] YES  [ ] NO

## 2020-02-23 NOTE — CHART NOTE - NSCHARTNOTEFT_GEN_A_CORE
Writer paged by nurse that patient is spiking fever 101.7. Blood culturesX2 sets were sent   Flagyl and Rocephin started  Primary team please follow up BC, UA , CXR

## 2020-02-23 NOTE — PROGRESS NOTE ADULT - PROBLEM SELECTOR PLAN 2
improving.   monitor triglyceride levels.  continue with gemfibrozil 600mg BID as per endocrine MD Pina. improving.   continue with gemfibrozil 600mg BID as per endocrine MD Pina.

## 2020-02-23 NOTE — PROGRESS NOTE ADULT - ASSESSMENT
39 yr old M with PMH of chronic ETOH abuse (drinks 8-9 cans daily, last intake 3 days ago), chronic nicotine user through vaping, Htn, Type II DM ( stopped taking medications since 1 year), paraspinal hernia of back s/p surgery presented with complain of mid epigastric pain since 1 day. Patient is admitted for ICU acute pancreatitis with hypertriglyceridemia     # Acute pancreatitis  # Acute hypertriglyceridemia  # ETOH abuse  # chronic nicotine through vaping  #Type II DM  #Htn  #Mild transaminitis      - Neuro;  # Alcohol abuse   - pt currently not in withdrawal   - c/w ativan IV prn as per CIWA     - Cardiovascular  # Htn:    compliant to medications ( used coreg)  BP stable off the meds for now     - Pulm:  No active issues    - ID:  No signs of infection  No needs of IV antibiotics at this tome   - f/u CT a/p     - Nephro:  Normal renal function    - GI:  Acute pancreatitis likely secondary to hypertriglyceridemia Alcohol abuse, uncontrolled DM, obesity,   Lipase trending down   continue with pain management  Avoid morphine  Started diet  aggressive IV hydration  Insulin drip  accuchecks q 1 hr  monitor for hypoglycemia- stable on 8 unit insulin infusion  continue D5 and D10  with IV fluids to prevent hypogylcemia   -f/u CT a/p to r/o pseudocyst/necrosis   - f/u US liver n GB to r/o GB stones     - Heme  CBC looks hemoconcentrated  F/u cbc daily    -Endocrine:  # DIabetes :   non compliant to metformin.   HBA1C- 11.9  - May require insulin as outpatient  continue with insulin drip  continue with D5 and D10 NS IV fluids    # Hypertriglyceridemia  - TG trending down   - Cont with insulin drip till TG < 1000   - f/u TG in am    - Endo consult Dr Cordoba   - Started on gemfibrozil 600mg BID      - Prophylaxis       RISK                                                          Points  [  ] Previous VTE                                                3  [  ] Thrombophilia                                             2  [  ] Lower limb paralysis                                   2        (unable to hold up >15 seconds)    [  ] Current Cancer                                             2         (within 6 months)  [ x ] Immobilization > 24 hrs                              1  [ x ] ICU/CCU stay > 24 hours                             1  [ x ] Age > 60                                                         1    IMPROVE VTE Score: 2  lovenox for VTE prophylaxis.    Disposition:  monitor in  ICU 39 yr old M with PMH of chronic ETOH abuse (drinks 8-9 cans daily, last intake 3 days ago), chronic nicotine user through vaping, Htn, Type II DM ( stopped taking medications since 1 year), paraspinal hernia of back s/p surgery presented with complain of mid epigastric pain since 1 day. Patient is admitted for ICU acute pancreatitis with hypertriglyceridemia     # Acute pancreatitis  # Acute hypertriglyceridemia  # ETOH abuse  # chronic nicotine through vaping  #Type II DM  #Htn  #Mild transaminitis      - Neuro;  # Alcohol abuse   - pt currently not in withdrawal   - c/w ativan IV prn as per CIWA     - Cardiovascular  # Htn:    compliant to medications ( used coreg)  BP stable off the meds for now     - Pulm:  No active issues    - ID:  No signs of infection  No needs of IV antibiotics at this tome   - f/u CT a/p     - Nephro:  Normal renal function    - GI:  Acute pancreatitis likely secondary to hypertriglyceridemia Alcohol abuse, uncontrolled DM, obesity,   Clinically improved  Off insulin drip due to target triglycerides  - Started on clears  - Started on LR       - Heme  Erythrocytosis resolved  - No thrombocytopenia    -Endocrine:  # DIabetes :   non compliant to metformin.   HBA1C- 11.9  - Will require insulin as outpatient  - TG <1000 so insulin drip discontinued   - Karl, Dr. Pina    # Hypertriglyceridemia  - TG trending down   - Cont with insulin drip till TG < 1000   - TG this AM- 831  Insulin drip discontinued  - Endo consult Dr Pina   - Started on gemfibrozil 600mg BID      - Prophylaxis       RISK                                                          Points  [  ] Previous VTE                                                3  [  ] Thrombophilia                                             2  [  ] Lower limb paralysis                                   2        (unable to hold up >15 seconds)    [  ] Current Cancer                                             2         (within 6 months)  [ x ] Immobilization > 24 hrs                              1  [ x ] ICU/CCU stay > 24 hours                             1  [ x ] Age > 60                                                         1    IMPROVE VTE Score: 2  lovenox for VTE prophylaxis.    Disposition:  monitor in  ICU 39 yr old M with PMH of chronic ETOH abuse (drinks 8-9 cans daily, last intake 3 days ago), chronic nicotine user through vaping, Htn, Type II DM ( stopped taking medications since 1 year), paraspinal hernia of back s/p surgery presented with complain of mid epigastric pain since 1 day. Patient is admitted for ICU acute pancreatitis with hypertriglyceridemia     # Acute pancreatitis  # Acute hypertriglyceridemia  # ETOH abuse  # chronic nicotine through vaping  #Type II DM  #Htn  #Mild transaminitis      - Neuro;  # Alcohol abuse   - pt currently not in withdrawal   - c/w ativan IV prn as per CIWA     - Cardiovascular  # Htn:    compliant to medications ( used coreg)  BP stable off the meds for now     - Pulm:  No active issues    - ID:  No signs of infection  No needs of IV antibiotics at this tome   - f/u CT a/p     - Nephro:  Normal renal function    - GI:  Acute pancreatitis likely secondary to hypertriglyceridemia Alcohol abuse, uncontrolled DM, obesity,   Clinically improved  Off insulin drip due to target triglycerides  - Started on clears  - Started on LR       - Heme  Erythrocytosis resolved  - No thrombocytopenia    -Endocrine:  # DIabetes :   non compliant to metformin.   HBA1C- 11.9  - Insulin drip discontinued due to target TG range  - Will start on insulin sliding scale  - Will start on Lantus 10units at bedtime and Humalog 4 units TID    # Hypertriglyceridemia  - TG trending down   - Cont with insulin drip till TG < 1000   - TG this AM- 831  Insulin drip discontinued  - Endo consult Dr Pina   - Started on gemfibrozil 600mg BID      - Prophylaxis       RISK                                                          Points  [  ] Previous VTE                                                3  [  ] Thrombophilia                                             2  [  ] Lower limb paralysis                                   2        (unable to hold up >15 seconds)    [  ] Current Cancer                                             2         (within 6 months)  [ x ] Immobilization > 24 hrs                              1  [ x ] ICU/CCU stay > 24 hours                             1  [ x ] Age > 60                                                         1    IMPROVE VTE Score: 2  lovenox for VTE prophylaxis.    Disposition:  monitor in  ICU 39 yr old M with PMH of chronic ETOH abuse (drinks 8-9 cans daily, last intake 3 days ago), chronic nicotine user through vaping, Htn, Type II DM ( stopped taking medications since 1 year), paraspinal hernia of back s/p surgery presented with complain of mid epigastric pain since 1 day. Patient is admitted for ICU acute pancreatitis with hypertriglyceridemia     # Acute pancreatitis  # Acute hypertriglyceridemia  # ETOH abuse  # chronic nicotine through vaping  #Type II DM  #Htn  #Mild transaminitis      Neuro;  # Alcohol abuse   - pt currently not in withdrawal   - c/w ativan IV prn as per CIWA -- 0-1 during ICU stay    - Cardiovascular  # Htn:    compliant to medications ( used coreg)  BP stable off the meds for now     - Pulm:  No active issues    - ID:  No signs of infection  No needs of IV antibiotics at this tome   - CT Abdomen and Pelvis w/ IV Cont showed inflammatory changes along the pancreas with extensions as noted compatible with acute pancreatitis.      - Nephro:  Normal renal function    - GI:  #Acute pancreatitis likely secondary to hypertriglyceridemia Alcohol abuse, uncontrolled DM, obesity,   Clinically improved  Off insulin drip due to target triglycerides  - Started on clear diet  - D/Cd LR       - Heme  Erythrocytosis resolved  - No thrombocytopenia    -Endocrine: - Endo consult Dr Pina   # DIabetes :   non compliant to metformin.   HBA1C- 11.9  - Insulin drip discontinued due to target TG range  - c/w insulin sliding scale  - c/w Lantus 10units at bedtime and Humalog 4 units TID    # Hypertriglyceridemia  - TG trending down   - Cont with insulin drip till TG < 1000   - TG this AM- 831  Insulin drip discontinued  - Started on gemfibrozil 600mg BID      - Prophylaxis       RISK                                                          Points  [  ] Previous VTE                                                3  [  ] Thrombophilia                                             2  [  ] Lower limb paralysis                                   2        (unable to hold up >15 seconds)    [  ] Current Cancer                                             2         (within 6 months)  [ x ] Immobilization > 24 hrs                              1  [ x ] ICU/CCU stay > 24 hours                             1  [ x ] Age > 60                                                         1    IMPROVE VTE Score: 2  lovenox for VTE prophylaxis.    Disposition:  transfer to medicine

## 2020-02-23 NOTE — PROGRESS NOTE ADULT - PROBLEM SELECTOR PLAN 1
transferred to ICU for insulin drip. Lipase improving, transferred from ICU to floor.   abd pain improving.  switched from NPO to clear liquid diet, promote PO fluids.  monitor CMP, Lipase, triglycerides. transferred to ICU for insulin drip. Lipase improving, transferred from ICU to floor.   abd pain improving.  switched from NPO to clear liquid diet, promote PO fluids.  monitor CMP, Lipase.

## 2020-02-23 NOTE — CHART NOTE - NSCHARTNOTEFT_GEN_A_CORE
<Hospital course>    Patient is 39 yr old M with PMH of chronic ETOH abuse (drinks 8-9 cans daily, last intake 3 days ago), chronic nicotine user through vaping, Htn, Type II DM ( stopped taking medications since 1 year), paraspinal hernia of back s/p surgery presented with complain of mid epigastric pain since 1 day. Patient states he initially develop intermittent Rt shoulder pain since one week and then developed sudden severe epigastric pain x 1 day. Epigastric pain was cramping, 10/10, progressively worsening, no radiation/ aggravating/ alleviating factors. He denies nausea/ vomiting/ diarrhea, constipation, fever, chills, previous episodes or any other complains. He denies taking any medications at home including OTC drugs. He was diagnosed with Htn and DM type II in 2015, Was prescribed metformin and coreg for DM and Htn but has stopped taking them since 1 year. His diet includes all fatty food including tacos. His last meal was tacos.  Patient was admitted for ICU acute pancreatitis with hypertriglyceridemia     Lipase was noted 5900 and TG was noted >3000, pt was started on insulin drip. CT Abdomen and Pelvis w/ IV Cont showed inflammatory changes along the pancreas with extensions as noted compatible with acute pancreatitis. TG down trended to 831 on 2/23. Insulin drip was D/Cd and switched to Lantus 10u and Humalog 4u TID + HSS, also stated clear liquid diet. Gemfibrozil 600mg BID was started.  Patient is stable for downgrade to medical  floor under care of Dr. Tanner for further management , covering resident ---------- was informed.      <Problem/Plan>     Neuro;  # Alcohol abuse   - pt currently not in withdrawal   - c/w ativan IV prn as per CIWA -- 0-1 during ICU stay    - Cardiovascular  # Htn:    compliant to medications ( used coreg)  BP stable off the meds for now     - Pulm:  No active issues    - ID:  No signs of infection  No needs of IV antibiotics at this tome   - CT Abdomen and Pelvis w/ IV Cont showed inflammatory changes along the pancreas with extensions as noted compatible with acute pancreatitis.      - Nephro:  Normal renal function    - GI:  Acute pancreatitis likely secondary to hypertriglyceridemia Alcohol abuse, uncontrolled DM, obesity,   Clinically improved  Off insulin drip due to target triglycerides  - Started on clears  - D/Cd LR       - Heme  Erythrocytosis resolved  - No thrombocytopenia    -Endocrine:  # DIabetes :   non compliant to metformin.   HBA1C- 11.9  - Insulin drip discontinued due to target TG range  - c/w insulin sliding scale  - c/w Lantus 10units at bedtime and Humalog 4 units TID    # Hypertriglyceridemia  - TG trending down   - Cont with insulin drip till TG < 1000   - TG this AM- 831  Insulin drip discontinued  - Endo consult Dr Pina   - Started on gemfibrozil 600mg BID      - Prophylaxis       RISK                                                          Points  [  ] Previous VTE                                                3  [  ] Thrombophilia                                             2  [  ] Lower limb paralysis                                   2        (unable to hold up >15 seconds)    [  ] Current Cancer                                             2         (within 6 months)  [ x ] Immobilization > 24 hrs                              1  [ x ] ICU/CCU stay > 24 hours                             1  [ x ] Age > 60                                                         1    IMPROVE VTE Score: 2  lovenox for VTE prophylaxis.    Disposition:  transfer to medicine      <Things to follow up>     - advance diet   - monitor blood glucose, TG, CIWA  - f/u US abd    - <Hospital course>    Patient is 39 yr old M with PMH of chronic ETOH abuse (drinks 8-9 cans daily, last intake 3 days ago), chronic nicotine user through vaping, Htn, Type II DM ( stopped taking medications since 1 year), paraspinal hernia of back s/p surgery presented with complain of mid epigastric pain since 1 day. Patient states he initially develop intermittent Rt shoulder pain since one week and then developed sudden severe epigastric pain x 1 day. Epigastric pain was cramping, 10/10, progressively worsening, no radiation/ aggravating/ alleviating factors. He denies nausea/ vomiting/ diarrhea, constipation, fever, chills, previous episodes or any other complains. He denies taking any medications at home including OTC drugs. He was diagnosed with Htn and DM type II in 2015, Was prescribed metformin and coreg for DM and Htn but has stopped taking them since 1 year. His diet includes all fatty food including tacos. His last meal was tacos.  Patient was admitted for ICU acute pancreatitis with hypertriglyceridemia     Lipase was noted 5900 and TG was noted >3000, pt was started on insulin drip. CT Abdomen and Pelvis w/ IV Cont showed inflammatory changes along the pancreas with extensions as noted compatible with acute pancreatitis. TG down trended to 831 on 2/23. Insulin drip was D/Cd and switched to Lantus 10u and Humalog 4u TID + HSS, also stated clear liquid diet. Gemfibrozil 600mg BID was started.  Patient is stable for downgrade to medical  floor under care of Dr. Tanner for further management , covering resident Dr. Medel was informed.      <Problem/Plan>     Neuro;  # Alcohol abuse   - pt currently not in withdrawal   - c/w ativan IV prn as per CIWA -- 0-1 during ICU stay    - Cardiovascular  # Htn:    compliant to medications ( used coreg)  BP stable off the meds for now     - Pulm:  No active issues    - ID:  No signs of infection  No needs of IV antibiotics at this tome   - CT Abdomen and Pelvis w/ IV Cont showed inflammatory changes along the pancreas with extensions as noted compatible with acute pancreatitis.      - Nephro:  Normal renal function    - GI:  Acute pancreatitis likely secondary to hypertriglyceridemia Alcohol abuse, uncontrolled DM, obesity,   Clinically improved  Off insulin drip due to target triglycerides  - Started on clears  - D/Cd LR       - Heme  Erythrocytosis resolved  - No thrombocytopenia    -Endocrine:  # DIabetes :   non compliant to metformin.   HBA1C- 11.9  - Insulin drip discontinued due to target TG range  - c/w insulin sliding scale  - c/w Lantus 10units at bedtime and Humalog 4 units TID    # Hypertriglyceridemia  - TG trending down   - Cont with insulin drip till TG < 1000   - TG this AM- 831  Insulin drip discontinued  - Endo consult Dr Pina   - Started on gemfibrozil 600mg BID      - Prophylaxis       RISK                                                          Points  [  ] Previous VTE                                                3  [  ] Thrombophilia                                             2  [  ] Lower limb paralysis                                   2        (unable to hold up >15 seconds)    [  ] Current Cancer                                             2         (within 6 months)  [ x ] Immobilization > 24 hrs                              1  [ x ] ICU/CCU stay > 24 hours                             1  [ x ] Age > 60                                                         1    IMPROVE VTE Score: 2  lovenox for VTE prophylaxis.    Disposition:  transfer to medicine      <Things to follow up>     - advance diet   - monitor blood glucose, TG, CIWA  - f/u US abd    - <Hospital course>    Patient is 39 yr old M with PMH of chronic ETOH abuse (drinks 8-9 cans daily, last intake 3 days ago), chronic nicotine user through vaping, Htn, Type II DM ( stopped taking medications since 1 year), paraspinal hernia of back s/p surgery presented with complain of mid epigastric pain since 1 day. Patient states he initially develop intermittent Rt shoulder pain since one week and then developed sudden severe epigastric pain x 1 day. Epigastric pain was cramping, 10/10, progressively worsening, no radiation/ aggravating/ alleviating factors. He denies nausea/ vomiting/ diarrhea, constipation, fever, chills, previous episodes or any other complains. He denies taking any medications at home including OTC drugs. He was diagnosed with Htn and DM type II in 2015, Was prescribed metformin and coreg for DM and Htn but has stopped taking them since 1 year. His diet includes all fatty food including tacos. His last meal was tacos.  Patient was admitted for ICU acute pancreatitis with hypertriglyceridemia     Lipase was noted 5900 and TG was noted >3000, pt was started on insulin drip. CT Abdomen and Pelvis w/ IV Cont showed inflammatory changes along the pancreas with extensions as noted compatible with acute pancreatitis. TG down trended to 831 on 2/23. Insulin drip was D/Cd and switched to Lantus 10u and Humalog 4u TID + HSS, also stated clear liquid diet. Gemfibrozil 600mg BID was started.  Patient is stable for downgrade to medical  floor under care of Dr. Tanner for further management , covering resident Dr. Medel was informed.      <Problem/Plan>     Neuro;  # Alcohol abuse   - pt currently not in withdrawal   - c/w ativan IV prn as per CIWA -- 0-1 during ICU stay    - Cardiovascular  # Htn:    compliant to medications ( used coreg)  BP stable off the meds for now     - Pulm:  No active issues    - ID:  No signs of infection  No needs of IV antibiotics at this tome   - CT Abdomen and Pelvis w/ IV Cont showed inflammatory changes along the pancreas with extensions as noted compatible with acute pancreatitis.      - Nephro:  Normal renal function    - GI:  Acute pancreatitis likely secondary to hypertriglyceridemia Alcohol abuse, uncontrolled DM, obesity,   Clinically improved  Off insulin drip due to target triglycerides  - Started on clears  - D/Cd LR       - Heme  Erythrocytosis resolved  - No thrombocytopenia    -Endocrine:  # DIabetes :   non compliant to metformin.   HBA1C- 11.9  - Insulin drip discontinued due to target TG range  - c/w insulin sliding scale  - c/w Lantus 10units at bedtime and Humalog 4 units TID    # Hypertriglyceridemia  - TG trending down   - Cont with insulin drip till TG < 1000   - TG this AM- 831  Insulin drip discontinued  - Endo consult Dr Pian   - Started on gemfibrozil 600mg BID      - Prophylaxis       RISK                                                          Points  [  ] Previous VTE                                                3  [  ] Thrombophilia                                             2  [  ] Lower limb paralysis                                   2        (unable to hold up >15 seconds)    [  ] Current Cancer                                             2         (within 6 months)  [ x ] Immobilization > 24 hrs                              1  [ x ] ICU/CCU stay > 24 hours                             1  [ x ] Age > 60                                                         1    IMPROVE VTE Score: 2  lovenox for VTE prophylaxis.    Disposition:  transfer to medicine      <Things to follow up>     - advance diet   - monitor blood glucose, TG, CIWA  - f/u US abd    -.

## 2020-02-23 NOTE — PROGRESS NOTE ADULT - PROBLEM SELECTOR PLAN 5
monitor blood glucose ac/hs.  continue with humalog sliding scale and lantus 10units at bedside and humalog 4 units TID.  consulted by endocrine, given diabetes education to pt about diet, checking blood glucose, and importance of taking metformin.  HBA1C- 11.9, nonadherent on metformin. HBA1C- 11.9, nonadherent on metformin.  monitor blood glucose ac/hs.  continue with humalog sliding scale and lantus 10units at bedside and humalog 4 units TID.  consulted by endocrine, given diabetes education to pt about diet, checking blood glucose, and importance of taking metformin.  inform nursing staff to education patient about how to check blood glucose and home insulin regime.

## 2020-02-23 NOTE — PHYSICAL THERAPY INITIAL EVALUATION ADULT - ADDITIONAL COMMENTS
Patient lives with cousins in private house.  Has to negotiate 8 steps to enter home then another 8 steps to get to the second level of home where he lives.  Patient ambulates independently with out an assistive device and was independent with all ADLs.

## 2020-02-23 NOTE — PROGRESS NOTE ADULT - SUBJECTIVE AND OBJECTIVE BOX
Patient is a 39y old  Male who presents with a chief complaint of Abdominal pain (2020 00:27)      INTERVAL HPI/OVERNIGHT EVENTS: downgraded to floor from ICU. pt denies fevers, states abd pain is improving.    MEDICATIONS  (STANDING):  enoxaparin Injectable 40 milliGRAM(s) SubCutaneous daily  ergocalciferol 49242 Unit(s) Oral <User Schedule>  folic acid 1 milliGRAM(s) Oral daily  gemfibrozil 600 milliGRAM(s) Oral two times a day  influenza   Vaccine 0.5 milliLiter(s) IntraMuscular once  insulin glargine Injectable (LANTUS) 10 Unit(s) SubCutaneous at bedtime  insulin lispro (HumaLOG) corrective regimen sliding scale   SubCutaneous three times a day before meals  insulin lispro (HumaLOG) corrective regimen sliding scale   SubCutaneous at bedtime  insulin lispro Injectable (HumaLOG) 4 Unit(s) SubCutaneous three times a day with meals  pantoprazole  Injectable 40 milliGRAM(s) IV Push daily  senna 2 Tablet(s) Oral at bedtime  thiamine Injectable 100 milliGRAM(s) IntraMuscular daily    MEDICATIONS  (PRN):  acetaminophen   Tablet .. 650 milliGRAM(s) Oral every 6 hours PRN Temp greater or equal to 38C (100.4F), Mild Pain (1 - 3)  aluminum hydroxide/magnesium hydroxide/simethicone Suspension 30 milliLiter(s) Oral every 6 hours PRN Dyspepsia  ketorolac   Injectable 30 milliGRAM(s) IV Push every 8 hours PRN Moderate Pain (4 - 6)  LORazepam   Injectable 1 milliGRAM(s) IV Push every 8 hours PRN CIWA>8  morphine  - Injectable 2 milliGRAM(s) IV Push every 4 hours PRN Severe Pain (7 - 10)      __________________________________________________  ----------------------------------------------------------------------------------  REVIEW OF SYSTEMS      General: denies fevers, chills, diaphoresis, weakness, diaphoresis.  Respiratory and Thorax: denies cough, shortness of breath.  Cardiovascular:	denies chest pain, palpitations.  Gastrointestinal:	endorses "some pain" in RUQ, but states "it is a lot better." denies n/v/diarrhea/constipation.  Genitourinary: denies dysuria/increased urgency or frequency.  Neurological: denies headaches, numbness, tingling.       Vital Signs Last 24 Hrs  T(C): 37.7 (2020 11:11), Max: 38.4 (2020 17:00)  T(F): 99.9 (2020 11:11), Max: 101.2 (2020 17:00)  HR: 94 (2020 11:11) (83 - 107)  BP: 143/82 (2020 11:11) (110/70 - 143/82)  BP(mean): 91 (2020 10:00) (80 - 99)  RR: 16 (2020 11:11) (16 - 27)  SpO2: 99% (2020 11:11) (95% - 100%)    _________________  PHYSICAL EXAM:    Constitutional: pt laying comfortably, well appearing, alert.  Respiratory: breath sounds clear and equal bilaterally. no wheezing/rhonchi/cough.  Cardiovascular: S1/S2 + no bruits, murmurs. cap refill brisk.  Gastrointestinal: soft, nontender and nondistended. +bowel sounds.   Extremities: no lower extremity edema noted.   Neurological: AOX4.    _________________________________________________  LABS:                        15.1   10.90 )-----------( 160      ( 2020 05:30 )             43.5     02-    137  |  107  |  3<L>  ----------------------------<  178<H>  3.6   |  25  |  0.73    Ca    8.2<L>      2020 05:30  Phos  2.4     -  Mg     1.7     -    TPro  5.8<L>  /  Alb  2.7<L>  /  TBili  0.6  /  DBili  x   /  AST  15  /  ALT  33  /  AlkPhos  70  02-23    PT/INR - ( 2020 21:57 )   PT: 10.5 sec;   INR: 0.95 ratio         PTT - ( 2020 21:57 )  PTT:32.6 sec  Urinalysis Basic - ( 2020 00:52 )    Color: Yellow / Appearance: Clear / S.025 / pH: x  Gluc: x / Ketone: Moderate  / Bili: Negative / Urobili: Negative   Blood: x / Protein: 100 / Nitrite: Negative   Leuk Esterase: Negative / RBC: 0-2 /HPF / WBC 0-2 /HPF   Sq Epi: x / Non Sq Epi: Occasional /HPF / Bacteria: Negative /HPF      CAPILLARY BLOOD GLUCOSE      POCT Blood Glucose.: 149 mg/dL (2020 11:43)  POCT Blood Glucose.: 208 mg/dL (2020 08:18)  POCT Blood Glucose.: 173 mg/dL (2020 05:25)  POCT Blood Glucose.: 165 mg/dL (2020 03:18)  POCT Blood Glucose.: 165 mg/dL (2020 02:16)  POCT Blood Glucose.: 163 mg/dL (2020 01:13)  POCT Blood Glucose.: 152 mg/dL (2020 00:16)  POCT Blood Glucose.: 176 mg/dL (2020 23:10)  POCT Blood Glucose.: 180 mg/dL (2020 22:21)  POCT Blood Glucose.: 201 mg/dL (2020 21:32)  POCT Blood Glucose.: 245 mg/dL (2020 20:29)  POCT Blood Glucose.: 207 mg/dL (2020 19:20)  POCT Blood Glucose.: 191 mg/dL (2020 18:31)  POCT Blood Glucose.: 169 mg/dL (2020 17:09)  POCT Blood Glucose.: 181 mg/dL (2020 16:15)  POCT Blood Glucose.: 193 mg/dL (2020 15:15)  POCT Blood Glucose.: 175 mg/dL (2020 14:19)  POCT Blood Glucose.: 192 mg/dL (2020 13:13)                Plan of care was discussed with patient ; all questions and concerns were addressed and care was aligned with patient's wishes.

## 2020-02-23 NOTE — PROGRESS NOTE ADULT - ASSESSMENT
38 yo male with history of Diabetes mellitus type two and HTN (stopped taking medication one year ago), chronic alcohol use (6-8 cans of beer a day, last drink on 2/19/20), chronic nicotine use with vaping presents complaining oc abd pain for three days mainly in epigastric region, endorses "excessive alcohol intake" five days prior to pain starting. pt admitted for acute pancreatitis, transferred to ICU for hypertriglyceridemia started on insulin drip. pt downgraded to floor today, lab work improving, pt states abd pain is improving.

## 2020-02-24 ENCOUNTER — TRANSCRIPTION ENCOUNTER (OUTPATIENT)
Age: 40
End: 2020-02-24

## 2020-02-24 LAB
ANION GAP SERPL CALC-SCNC: 7 MMOL/L — SIGNIFICANT CHANGE UP (ref 5–17)
BASOPHILS # BLD AUTO: 0.05 K/UL — SIGNIFICANT CHANGE UP (ref 0–0.2)
BASOPHILS NFR BLD AUTO: 0.5 % — SIGNIFICANT CHANGE UP (ref 0–2)
BUN SERPL-MCNC: 7 MG/DL — SIGNIFICANT CHANGE UP (ref 7–18)
CALCIUM SERPL-MCNC: 9.2 MG/DL — SIGNIFICANT CHANGE UP (ref 8.4–10.5)
CHLORIDE SERPL-SCNC: 106 MMOL/L — SIGNIFICANT CHANGE UP (ref 96–108)
CO2 SERPL-SCNC: 23 MMOL/L — SIGNIFICANT CHANGE UP (ref 22–31)
CREAT SERPL-MCNC: 0.87 MG/DL — SIGNIFICANT CHANGE UP (ref 0.5–1.3)
EOSINOPHIL # BLD AUTO: 0.19 K/UL — SIGNIFICANT CHANGE UP (ref 0–0.5)
EOSINOPHIL NFR BLD AUTO: 1.9 % — SIGNIFICANT CHANGE UP (ref 0–6)
GLUCOSE BLDC GLUCOMTR-MCNC: 157 MG/DL — HIGH (ref 70–99)
GLUCOSE BLDC GLUCOMTR-MCNC: 172 MG/DL — HIGH (ref 70–99)
GLUCOSE BLDC GLUCOMTR-MCNC: 189 MG/DL — HIGH (ref 70–99)
GLUCOSE BLDC GLUCOMTR-MCNC: 190 MG/DL — HIGH (ref 70–99)
GLUCOSE SERPL-MCNC: 184 MG/DL — HIGH (ref 70–99)
HCT VFR BLD CALC: 48 % — SIGNIFICANT CHANGE UP (ref 39–50)
HGB BLD-MCNC: 16.5 G/DL — SIGNIFICANT CHANGE UP (ref 13–17)
IMM GRANULOCYTES NFR BLD AUTO: 0.8 % — SIGNIFICANT CHANGE UP (ref 0–1.5)
LYMPHOCYTES # BLD AUTO: 1.11 K/UL — SIGNIFICANT CHANGE UP (ref 1–3.3)
LYMPHOCYTES # BLD AUTO: 11 % — LOW (ref 13–44)
MAGNESIUM SERPL-MCNC: 2.2 MG/DL — SIGNIFICANT CHANGE UP (ref 1.6–2.6)
MCHC RBC-ENTMCNC: 31.6 PG — SIGNIFICANT CHANGE UP (ref 27–34)
MCHC RBC-ENTMCNC: 34.4 GM/DL — SIGNIFICANT CHANGE UP (ref 32–36)
MCV RBC AUTO: 92 FL — SIGNIFICANT CHANGE UP (ref 80–100)
MONOCYTES # BLD AUTO: 0.7 K/UL — SIGNIFICANT CHANGE UP (ref 0–0.9)
MONOCYTES NFR BLD AUTO: 6.9 % — SIGNIFICANT CHANGE UP (ref 2–14)
NEUTROPHILS # BLD AUTO: 8 K/UL — HIGH (ref 1.8–7.4)
NEUTROPHILS NFR BLD AUTO: 78.9 % — HIGH (ref 43–77)
NRBC # BLD: 0 /100 WBCS — SIGNIFICANT CHANGE UP (ref 0–0)
PHOSPHATE SERPL-MCNC: 2.7 MG/DL — SIGNIFICANT CHANGE UP (ref 2.5–4.5)
PLATELET # BLD AUTO: 179 K/UL — SIGNIFICANT CHANGE UP (ref 150–400)
POTASSIUM SERPL-MCNC: 4.1 MMOL/L — SIGNIFICANT CHANGE UP (ref 3.5–5.3)
POTASSIUM SERPL-SCNC: 4.1 MMOL/L — SIGNIFICANT CHANGE UP (ref 3.5–5.3)
RBC # BLD: 5.22 M/UL — SIGNIFICANT CHANGE UP (ref 4.2–5.8)
RBC # FLD: 12.4 % — SIGNIFICANT CHANGE UP (ref 10.3–14.5)
SODIUM SERPL-SCNC: 136 MMOL/L — SIGNIFICANT CHANGE UP (ref 135–145)
TRIGL SERPL-MCNC: 507 MG/DL — HIGH (ref 10–149)
WBC # BLD: 10.13 K/UL — SIGNIFICANT CHANGE UP (ref 3.8–10.5)
WBC # FLD AUTO: 10.13 K/UL — SIGNIFICANT CHANGE UP (ref 3.8–10.5)

## 2020-02-24 PROCEDURE — 76705 ECHO EXAM OF ABDOMEN: CPT | Mod: 26

## 2020-02-24 PROCEDURE — 99233 SBSQ HOSP IP/OBS HIGH 50: CPT | Mod: GC

## 2020-02-24 RX ORDER — MUPIROCIN 20 MG/G
1 OINTMENT TOPICAL
Refills: 0 | Status: DISCONTINUED | OUTPATIENT
Start: 2020-02-24 | End: 2020-02-25

## 2020-02-24 RX ORDER — THIAMINE MONONITRATE (VIT B1) 100 MG
100 TABLET ORAL DAILY
Refills: 0 | Status: DISCONTINUED | OUTPATIENT
Start: 2020-02-24 | End: 2020-02-25

## 2020-02-24 RX ORDER — CHLORHEXIDINE GLUCONATE 213 G/1000ML
1 SOLUTION TOPICAL DAILY
Refills: 0 | Status: DISCONTINUED | OUTPATIENT
Start: 2020-02-24 | End: 2020-02-25

## 2020-02-24 RX ADMIN — Medication 1: at 08:18

## 2020-02-24 RX ADMIN — Medication 650 MILLIGRAM(S): at 06:32

## 2020-02-24 RX ADMIN — INSULIN GLARGINE 10 UNIT(S): 100 INJECTION, SOLUTION SUBCUTANEOUS at 22:05

## 2020-02-24 RX ADMIN — Medication 650 MILLIGRAM(S): at 05:25

## 2020-02-24 RX ADMIN — Medication 1: at 12:14

## 2020-02-24 RX ADMIN — SENNA PLUS 2 TABLET(S): 8.6 TABLET ORAL at 22:05

## 2020-02-24 RX ADMIN — Medication 100 MILLIGRAM(S): at 14:07

## 2020-02-24 RX ADMIN — Medication 100 MILLIGRAM(S): at 22:04

## 2020-02-24 RX ADMIN — PANTOPRAZOLE SODIUM 40 MILLIGRAM(S): 20 TABLET, DELAYED RELEASE ORAL at 12:10

## 2020-02-24 RX ADMIN — Medication 4 UNIT(S): at 12:09

## 2020-02-24 RX ADMIN — Medication 1: at 17:30

## 2020-02-24 RX ADMIN — Medication 100 MILLIGRAM(S): at 12:35

## 2020-02-24 RX ADMIN — Medication 1 MILLIGRAM(S): at 12:10

## 2020-02-24 RX ADMIN — Medication 600 MILLIGRAM(S): at 17:31

## 2020-02-24 RX ADMIN — CHLORHEXIDINE GLUCONATE 1 APPLICATION(S): 213 SOLUTION TOPICAL at 12:39

## 2020-02-24 RX ADMIN — Medication 4 UNIT(S): at 17:30

## 2020-02-24 RX ADMIN — Medication 600 MILLIGRAM(S): at 05:26

## 2020-02-24 RX ADMIN — MUPIROCIN 1 APPLICATION(S): 20 OINTMENT TOPICAL at 17:31

## 2020-02-24 RX ADMIN — ENOXAPARIN SODIUM 40 MILLIGRAM(S): 100 INJECTION SUBCUTANEOUS at 12:10

## 2020-02-24 RX ADMIN — Medication 100 MILLIGRAM(S): at 05:26

## 2020-02-24 RX ADMIN — Medication 4 UNIT(S): at 08:18

## 2020-02-24 RX ADMIN — CEFTRIAXONE 100 MILLIGRAM(S): 500 INJECTION, POWDER, FOR SOLUTION INTRAMUSCULAR; INTRAVENOUS at 17:30

## 2020-02-24 NOTE — DISCHARGE NOTE PROVIDER - NSDCMRMEDTOKEN_GEN_ALL_CORE_FT
clindamycin 300 mg oral capsule: 1 cap(s) orally every 6 hours Basaglar KwikPen 100 units/mL subcutaneous solution: 10 unit(s) subcutaneous once a day (at bedtime)   Drisdol 50,000 intl units (1.25 mg) oral capsule: 1 cap(s) orally once a week  folic acid 1 mg oral tablet: 1 tab(s) orally once a day  gemfibrozil 600 mg oral tablet: 1 tab(s) orally 2 times a day  NovoLOG FlexPen 100 units/mL injectable solution: 4 unit(s) injectable 3 times a day before meals  thiamine 100 mg oral tablet: 1 tab(s) orally once a day

## 2020-02-24 NOTE — PROGRESS NOTE ADULT - SUBJECTIVE AND OBJECTIVE BOX
Interval Events: Patient is off insulin drip after Triglyceride level was 831. Started on lantus 10 units and 4 units TID humalog. Blood glucose level is controlled.      Allergies    penicillin (Rash)    Intolerances      Endocrine/Metabolic Medications:  gemfibrozil 600 milliGRAM(s) Oral two times a day  insulin glargine Injectable (LANTUS) 10 Unit(s) SubCutaneous at bedtime  insulin lispro (HumaLOG) corrective regimen sliding scale   SubCutaneous three times a day before meals  insulin lispro (HumaLOG) corrective regimen sliding scale   SubCutaneous at bedtime  insulin lispro Injectable (HumaLOG) 4 Unit(s) SubCutaneous three times a day with meals      Vital Signs Last 24 Hrs  T(C): 37.3 (24 Feb 2020 10:54), Max: 38.7 (23 Feb 2020 15:34)  T(F): 99.1 (24 Feb 2020 10:54), Max: 101.7 (23 Feb 2020 15:34)  HR: 85 (24 Feb 2020 10:54) (85 - 105)  BP: 107/64 (24 Feb 2020 10:54) (107/64 - 145/89)  BP(mean): --  RR: 18 (24 Feb 2020 10:54) (17 - 18)  SpO2: 98% (24 Feb 2020 10:54) (97% - 99%)      PHYSICAL EXAM  All physical exam findings normal, except those marked:  General:	Alert, active, cooperative, NAD, well hydrated  .		[] Abnormal:  Neck		Normal: supple, no cervical adenopathy, no palpable thyroid  .		[] Abnormal:  Cardiovascular	Normal: regular rate, normal S1, S2, no murmurs  .		[] Abnormal:  Respiratory	Normal: no chest wall deformity, normal respiratory pattern, CTA B/L  .		[] Abnormal:  Abdominal	Normal: soft, ND, NT, bowel sounds present, no masses, no organomegaly  .		[] Abnormal:  		Normal normal genitalia, testes descended, circumcised/uncircumcised  .		Bina stage:			Breast bina:  .		Menstrual history:  .		[] Abnormal:  Extremities	Normal: FROM x4  .		[] Abnormal:  Skin		Normal: intact and not indurated, no rash, no acanthosis nigricans  .		[] Abnormal:  Neurologic	Normal: grossly intact  .		[] Abnormal:    LABS                        16.5   10.13 )-----------( 179      ( 24 Feb 2020 07:50 )             48.0                               136    |  106    |  7                   Calcium: 9.2   / iCa: x      (02-24 @ 07:50)    ----------------------------<  184       Magnesium: 2.2                              4.1     |  23     |  0.87             Phosphorous: 2.7        CAPILLARY BLOOD GLUCOSE      POCT Blood Glucose.: 157 mg/dL (24 Feb 2020 07:56)  POCT Blood Glucose.: 162 mg/dL (23 Feb 2020 21:10)  POCT Blood Glucose.: 208 mg/dL (23 Feb 2020 16:57)  POCT Blood Glucose.: 149 mg/dL (23 Feb 2020 11:43)        Assesment/plan

## 2020-02-24 NOTE — PROGRESS NOTE ADULT - ASSESSMENT
40 yo male with history of Diabetes mellitus type two and HTN (stopped taking medication one year ago), chronic alcohol use (6-8 cans of beer a day, last drink on 2/19/20), chronic nicotine use with vaping presents complaining oc abd pain for three days mainly in epigastric region, endorses "excessive alcohol intake" five days prior to pain starting. pt admitted for acute pancreatitis, transferred to ICU for hypertriglyceridemia started on insulin drip. pt downgraded to floor today, lab work improving, pt states abd pain is improving.

## 2020-02-24 NOTE — DISCHARGE NOTE PROVIDER - CARE PROVIDER_API CALL
Camryn Pina)  EndocrinologyMetabDiabetes  [address] [place] [address]  Phone: [phone]  Fax: [phone]  Follow Up Time:

## 2020-02-24 NOTE — PROGRESS NOTE ADULT - SUBJECTIVE AND OBJECTIVE BOX
PGY 1 Note discussed with supervising resident and primary attending    Patient is a 39y old  Male who presents with a chief complaint of Abdominal pain (2020 13:08)      INTERVAL HPI/OVERNIGHT EVENTS: Patient seen and examined at the bedside.     MEDICATIONS  (STANDING):  cefTRIAXone   IVPB      cefTRIAXone   IVPB 1000 milliGRAM(s) IV Intermittent every 24 hours  enoxaparin Injectable 40 milliGRAM(s) SubCutaneous daily  ergocalciferol 18327 Unit(s) Oral <User Schedule>  folic acid 1 milliGRAM(s) Oral daily  gemfibrozil 600 milliGRAM(s) Oral two times a day  influenza   Vaccine 0.5 milliLiter(s) IntraMuscular once  insulin glargine Injectable (LANTUS) 10 Unit(s) SubCutaneous at bedtime  insulin lispro (HumaLOG) corrective regimen sliding scale   SubCutaneous three times a day before meals  insulin lispro (HumaLOG) corrective regimen sliding scale   SubCutaneous at bedtime  insulin lispro Injectable (HumaLOG) 4 Unit(s) SubCutaneous three times a day with meals  metroNIDAZOLE  IVPB      metroNIDAZOLE  IVPB 500 milliGRAM(s) IV Intermittent every 8 hours  pantoprazole  Injectable 40 milliGRAM(s) IV Push daily  senna 2 Tablet(s) Oral at bedtime  thiamine Injectable 100 milliGRAM(s) IntraMuscular daily    MEDICATIONS  (PRN):  acetaminophen   Tablet .. 650 milliGRAM(s) Oral every 6 hours PRN Temp greater or equal to 38C (100.4F), Mild Pain (1 - 3)  aluminum hydroxide/magnesium hydroxide/simethicone Suspension 30 milliLiter(s) Oral every 6 hours PRN Dyspepsia  ketorolac   Injectable 30 milliGRAM(s) IV Push every 8 hours PRN Moderate Pain (4 - 6)  morphine  - Injectable 2 milliGRAM(s) IV Push every 4 hours PRN Severe Pain (7 - 10)      __________________________________________________  REVIEW OF SYSTEMS:    CONSTITUTIONAL: No fever,   EYES: no acute visual disturbances  NECK: No pain or stiffness  RESPIRATORY: No cough; No shortness of breath  CARDIOVASCULAR: No chest pain, no palpitations  GASTROINTESTINAL: No pain. No nausea or vomiting; No diarrhea   NEUROLOGICAL: No headache or numbness, no tremors  MUSCULOSKELETAL: No joint pain, no muscle pain  GENITOURINARY: no dysuria, no frequency, no hesitancy  PSYCHIATRY: no depression , no anxiety  ALL OTHER  ROS negative        Vital Signs Last 24 Hrs  T(C): 37.3 (2020 06:32), Max: 38.7 (2020 15:34)  T(F): 99.2 (2020 06:32), Max: 101.7 (2020 15:34)  HR: 92 (2020 05:15) (89 - 105)  BP: 133/83 (2020 05:15) (133/83 - 145/89)  BP(mean): --  RR: 18 (2020 05:15) (16 - 18)  SpO2: 97% (2020 05:15) (97% - 99%)    ________________________________________________  PHYSICAL EXAM:  GENERAL: NAD  HEENT: Normocephalic;  conjunctivae and sclerae clear; moist mucous membranes;   NECK : supple  CHEST/LUNG: Clear to auscultation bilaterally with good air entry   HEART: S1 S2  regular; no murmurs, gallops or rubs  ABDOMEN: Soft, Nontender, Nondistended; Bowel sounds present  EXTREMITIES: no cyanosis; no edema; no calf tenderness  SKIN: warm and dry; no rash  NERVOUS SYSTEM:  Awake and alert; Oriented  to place, person and time ; no new deficits    _________________________________________________  LABS:                        16.5   10.13 )-----------( 179      ( 2020 07:50 )             48.0     02-    136  |  106  |  7   ----------------------------<  184<H>  4.1   |  23  |  0.87    Ca    9.2      2020 07:50  Phos  2.7     02-24  Mg     2.2         TPro  5.8<L>  /  Alb  2.7<L>  /  TBili  0.6  /  DBili  x   /  AST  15  /  ALT  33  /  AlkPhos  70        Urinalysis Basic - ( 2020 16:22 )    Color: Yellow / Appearance: Clear / S.010 / pH: x  Gluc: x / Ketone: Negative  / Bili: Negative / Urobili: Negative   Blood: x / Protein: 15 / Nitrite: Negative   Leuk Esterase: Negative / RBC: 0-2 /HPF / WBC 0-2 /HPF   Sq Epi: x / Non Sq Epi: Few /HPF / Bacteria: Trace /HPF      CAPILLARY BLOOD GLUCOSE      POCT Blood Glucose.: 157 mg/dL (2020 07:56)  POCT Blood Glucose.: 162 mg/dL (2020 21:10)  POCT Blood Glucose.: 208 mg/dL (2020 16:57)  POCT Blood Glucose.: 149 mg/dL (2020 11:43)        RADIOLOGY & ADDITIONAL TESTS:    Imaging Personally Reviewed:  YES/NO    Consultant(s) Notes Reviewed:   YES/ No    Care Discussed with Consultants :     Plan of care was discussed with patient and /or primary care giver; all questions and concerns were addressed and care was aligned with patient's wishes. PGY 1 Note discussed with supervising resident and primary attending    Patient is a 39y old  Male who presents with a chief complaint of Abdominal pain (2020 13:08)    INTERVAL HPI/OVERNIGHT EVENTS: Patient seen and examined at the bedside. Patient was downgraded from ICU over the weekend. Patient states feeling much better this morning. Denies any abdominal pain, nausea and or vomiting. Advancing diet from clears to regular. Patient denies any concerns or complaints at this time.     MEDICATIONS  (STANDING):  cefTRIAXone   IVPB      cefTRIAXone   IVPB 1000 milliGRAM(s) IV Intermittent every 24 hours  enoxaparin Injectable 40 milliGRAM(s) SubCutaneous daily  ergocalciferol 91899 Unit(s) Oral <User Schedule>  folic acid 1 milliGRAM(s) Oral daily  gemfibrozil 600 milliGRAM(s) Oral two times a day  influenza   Vaccine 0.5 milliLiter(s) IntraMuscular once  insulin glargine Injectable (LANTUS) 10 Unit(s) SubCutaneous at bedtime  insulin lispro (HumaLOG) corrective regimen sliding scale   SubCutaneous three times a day before meals  insulin lispro (HumaLOG) corrective regimen sliding scale   SubCutaneous at bedtime  insulin lispro Injectable (HumaLOG) 4 Unit(s) SubCutaneous three times a day with meals  metroNIDAZOLE  IVPB      metroNIDAZOLE  IVPB 500 milliGRAM(s) IV Intermittent every 8 hours  pantoprazole  Injectable 40 milliGRAM(s) IV Push daily  senna 2 Tablet(s) Oral at bedtime  thiamine Injectable 100 milliGRAM(s) IntraMuscular daily    MEDICATIONS  (PRN):  acetaminophen   Tablet .. 650 milliGRAM(s) Oral every 6 hours PRN Temp greater or equal to 38C (100.4F), Mild Pain (1 - 3)  aluminum hydroxide/magnesium hydroxide/simethicone Suspension 30 milliLiter(s) Oral every 6 hours PRN Dyspepsia  ketorolac   Injectable 30 milliGRAM(s) IV Push every 8 hours PRN Moderate Pain (4 - 6)  morphine  - Injectable 2 milliGRAM(s) IV Push every 4 hours PRN Severe Pain (7 - 10)      __________________________________________________  REVIEW OF SYSTEMS:  CONSTITUTIONAL: No fever  EYES: no visual disturbances  NECK: No pain   RESPIRATORY: No cough; No shortness of breath  CARDIOVASCULAR: No chest pain  GASTROINTESTINAL: No pain. No nausea or vomiting   NEUROLOGICAL: No headache  MUSCULOSKELETAL: No joint pain, no muscle pain  GENITOURINARY: no dysuria  PSYCHIATRY: no anxiety  ALL OTHER  ROS negative        Vital Signs Last 24 Hrs  T(C): 37.3 (2020 06:32), Max: 38.7 (2020 15:34)  T(F): 99.2 (2020 06:32), Max: 101.7 (2020 15:34)  HR: 92 (2020 05:15) (89 - 105)  BP: 133/83 (2020 05:15) (133/83 - 145/89)  RR: 18 (2020 05:15) (16 - 18)  SpO2: 97% (2020 05:15) (97% - 99%)    ________________________________________________  PHYSICAL EXAM:  GENERAL: Patient seen resting in bed and in no apparent distress  HEENT: Normocephalic; conjunctivae and sclerae clear   NECK: supple  CHEST/LUNG: Clear to auscultation bilaterally    HEART: S1 S2, regular; no murmurs  ABDOMEN: Soft, Nontender, Nondistended; Bowel sounds present  EXTREMITIES: no edema; no calf tenderness  SKIN: warm and dry  NERVOUS SYSTEM: Awake and alert; Oriented to place, person and time    _________________________________________________  LABS:                        16.5   10.13 )-----------( 179      ( 2020 07:50 )             48.0     -    136  |  106  |  7   ----------------------------<  184<H>  4.1   |  23  |  0.87    Ca    9.2      2020 07:50  Phos  2.7       Mg     2.2     02-24    TPro  5.8<L>  /  Alb  2.7<L>  /  TBili  0.6  /  DBili  x   /  AST  15  /  ALT  33  /  AlkPhos  70        Urinalysis Basic - ( 2020 16:22 )    Color: Yellow / Appearance: Clear / S.010 / pH: x  Gluc: x / Ketone: Negative  / Bili: Negative / Urobili: Negative   Blood: x / Protein: 15 / Nitrite: Negative   Leuk Esterase: Negative / RBC: 0-2 /HPF / WBC 0-2 /HPF   Sq Epi: x / Non Sq Epi: Few /HPF / Bacteria: Trace /HPF      CAPILLARY BLOOD GLUCOSE    POCT Blood Glucose.: 157 mg/dL (2020 07:56)  POCT Blood Glucose.: 162 mg/dL (2020 21:10)  POCT Blood Glucose.: 208 mg/dL (2020 16:57)  POCT Blood Glucose.: 149 mg/dL (2020 11:43)      RADIOLOGY & ADDITIONAL TESTS:    Imaging Personally Reviewed:  YES     < from: US Hepatic & Pancreatic (20 @ 10:49) >  IMPRESSION:     Enlarged, fatty liver.  No cholelithiasis or biliary ductal dilatation.    < end of copied text >    Consultant(s) Notes Reviewed:   YES     Care Discussed with Consultants :     Plan of care was discussed with patient and /or primary care giver; all questions and concerns were addressed and care was aligned with patient's wishes.

## 2020-02-24 NOTE — PROGRESS NOTE ADULT - PROBLEM SELECTOR PLAN 6
monitor BP, currently controlled without medication.   goal BP <130/80.
monitor BP, currently controlled without medication.   goal BP <130/80.

## 2020-02-24 NOTE — DIETITIAN INITIAL EVALUATION ADULT. - DIET TYPE
Diabetic full Liquid consistent carbohydrate (evening snack)/full fluid/low fat/DASH/TLC (sodium and cholesterol restricted diet)

## 2020-02-24 NOTE — PROGRESS NOTE ADULT - ATTENDING COMMENTS
This is a 39 yr old M with PMH of chronic ETOH abuse (drinks 8-9 cans daily, last intake 3 days ago), chronic nicotine user through vaping, Htn, Type II DM ( stopped taking medications since 1 year), paraspinal hernia of back s/p surgery presented with complain of mid epigastric pain since 1 day. Patient is admitted for ICU acute pancreatitis with hypertriglyceridemia . Lipase and triglyceride is trending down    # Acute pancreatitis  # Acute hypertriglyceridemia  # ETOH abuse    Plan  -continue insulin gtt  -q1h FS  -monitor labs  -endo eval  -advise to stop drinking alcohol/beers and smoking  -monitor for DT's  -thiamine /folate/B12/ MVI
Pat admitted for acute pancreatitis due to ETOH and hypertriglyceridemia .. lipase and triglyceride trending down , off insulin gtt. no abd pain and started on clear diet.   -continue lopid  -f/u endo  -advise to stop using ETOH
Seen and examined. Discussed with Dr. Reyes    Noted tem spike yesterday afternoon and was started on empiric ceftriaxone and flagyl. blood cultures testing.  Patient denies any urinary symptoms, no cough or respiratory symptoms, denies abdominal pain, nausea or vomiting. tolerating full liquid diet.   Fs at goal  Appreciated Endocrine follow up.    A/P  Acute pancreatitis resolving secondary to hypertriglyceridemia  hypertriglyceridemia improving   Uncontrolled type 2 DM HBA1c 11.9  HTN at goal without medication  Active smoker  Alcohol abuse    Plan  c/w empiric antibiotics pending cultures  Advance diet to low fat diet  Patient educated about insulin injections, is comfortable injecting
seen and examined this morning.  Agree with Above,   Downgraded form MICU s/p acute pancreatitis secondary to hypertriglyceridemia requiring insulin drip.      No abdominal pain. hasn't required ativan during the hospital course  Transitioned to basal bolus regimen per endocrine for uncontrolled DM.    A/P  Acute pancreatitis resolving secondary to hypertriglyceridemia  hypertriglyceridemia  Uncontrolled type 2 DM HBA1c 11.9  HTN at goal without medication  Active smoker  Alcohol abuse    C/w current care  Basl bolus regimen, titrate per Fs  discussed with RN to educate about insulin injection skills  advance diet as tolerated  discussed at length about healthy eating habits, limit fat, and carbohydrates and to increase fruit and vegetables in diet, regular exercise, smoking and alcohol cessation.

## 2020-02-24 NOTE — DISCHARGE NOTE PROVIDER - ATTENDING COMMENTS
Patient seen and examined at bedside no adnominal pain. Afebrile for 24 hrs        Vital Signs Last 24 Hrs    T(C): 36.8 (25 Feb 2020 12:52), Max: 36.9 (24 Feb 2020 14:59)    T(F): 98.2 (25 Feb 2020 12:52), Max: 98.5 (24 Feb 2020 14:59)    HR: 77 (25 Feb 2020 12:52) (77 - 90)    BP: 144/90 (25 Feb 2020 12:52) (127/78 - 145/90)    BP(mean): --    RR: 16 (25 Feb 2020 12:52) (16 - 18)    SpO2: 98% (25 Feb 2020 12:52) (97% - 98%)        OE    Young male in NAD    RRR s1s2    clear lungs no rales or rhonchi    Soft, NT, ND, +BS    No pedal edema    Awake, alert oriented x4        A/P    Acute pancreatitis resolved secondary to hypertriglyceridemia requiring ICU care for insulin drip.     Hypertriglyceridemia    Uncontrolled type 2 DM , HBA1c 11.9    HTN at goal without medication    Active smoker    Alcohol abuse        Plan    Medically stable to discharge home    c/w basl bolus regimen and Lopid as prescribed    Patient is educated about injecting insulin     Advised follow up with PCP in 3-4 days and  Endocrine for lipid and DM control , have lipid and Aic repeated in 3 months        Total time spent on discharge 45 mins    I have discussed at length about healthy eating habits, limit fat, and carbohydrates and to increase fruit and vegetables in diet, regular exercise, smoking and alcohol cessation, patient verbalize understanding.

## 2020-02-24 NOTE — PROGRESS NOTE ADULT - ASSESSMENT
Patient is 39 yr old M with PMH of chronic ETOH abuse (drinks 8-9 cans daily, last intake 3 days ago), chronic nicotine user through vaping, Htn, Type II DM ( stopped taking medications since 1 year), paraspinal hernia of back s/p surgery presented with complain of mid epigastric pain since 1 day. Found to have hypertriglyceridemia /pancreatitis and un cont dm. Pt self d/c metformin a year ago.      Problem/Recommendation - 1:  Problem: Hypertriglyceridemia. Recommendation: with pancreatitis due to etoh abuse  patient is off insulin drip since serum TGs level is less than 1000.  will recommend repeating level today.  agree with lantus 10 units and humalod 4 units TID.  Continue lopid 600 bid  full liquid diet     Problem/Recommendation - 2:  ·  Problem: Diabetes.  Recommendation: un cont due to non compliance with meds and diet  rec insulin tx - d/w pt and his fiance at the bedside   dm teaching.  blood sugar level is better controlled. Patient is 39 yr old M with PMH of chronic ETOH abuse (drinks 8-9 cans daily, last intake 3 days ago), chronic nicotine user through vaping, Htn, Type II DM ( stopped taking medications since 1 year), paraspinal hernia of back s/p surgery presented with complain of mid epigastric pain since 1 day. Found to have hypertriglyceridemia /pancreatitis and un cont dm. Pt self d/c metformin a year ago.      Problem/Recommendation - 1:  Problem: Hypertriglyceridemia. Recommendation: with pancreatitis due to etoh abuse  patient is off insulin drip since serum TGs level is less than 1000.  will recommend repeating level today.  agree with lantus 10 units and humalod 4 units TID.  Continue lopid 600 bid  full liquid diet.     Problem/Recommendation - 2:  ·  Problem: Diabetes.  Recommendation: un cont due to non compliance with meds and diet  rec insulin tx - d/w pt and his fiance at the bedside   dm teaching.  blood sugar level is better controlled.

## 2020-02-24 NOTE — PROGRESS NOTE ADULT - PROBLEM SELECTOR PROBLEM 1
Alcohol-induced acute pancreatitis without infection or necrosis
Alcohol-induced acute pancreatitis without infection or necrosis

## 2020-02-24 NOTE — DISCHARGE NOTE PROVIDER - HOSPITAL COURSE
Patient is 39 yr old M, with PMH of chronic ETOH abuse, chronic nicotine user through vaping, HTN, Type II DM, paraspinal hernia of back s/p surgery, who presented with complaints of mid epigastric pain for 1 day. Patient states he initially develop intermittent Rt shoulder pain since one week and then developed sudden severe epigastric pain x 1 day. Epigastric pain was cramping, 10/10, progressively worsening, no radiation/ aggravating/ alleviating factors. He denies nausea/ vomiting/ diarrhea, constipation, fever, chills, previous episodes or any other complains. He denies taking any medications at home including OTC drugs. He was diagnosed with Htn and DM type II in 2015, Was prescribed metformin and coreg for DM and Htn but has stopped taking them since 1 year. His diet includes all fatty food including tacos. His last meal was tacos.  Patient was admitted for ICU acute pancreatitis with hypertriglyceridemia         Lipase was noted 5900 and TG was noted >3000, pt was started on insulin drip. CT Abdomen and Pelvis w/ IV Cont showed inflammatory changes along the pancreas with extensions as noted compatible with acute pancreatitis. TG down trended to 831 on 2/23. Insulin drip was D/Cd and switched to Lantus 10u and Humalog 4u TID + HSS, also stated clear liquid diet. Gemfibrozil 600mg BID was started.    Patient is stable for downgrade to medical  floor under care of Dr. Tanner for further management , covering resident Dr. Medel was informed. Patient is 39 yr old M, with PMH of chronic ETOH abuse, chronic nicotine user through vaping, HTN, Type II DM, paraspinal hernia of back s/p surgery, who presented with complaints of mid epigastric pain for 1 day. Patient states he initially develop intermittent Rt shoulder pain since one week and then developed sudden severe epigastric pain x 1 day.         Lipase was noted to be 5900 and TG was noted to be over 3000. Patient was admitted to ICU for acute pancreatitis with hypertriglyceridemia. Patient was started on insulin drip. CT Abdomen and Pelvis w/ IV Cont showed inflammatory changes along the pancreas with extensions as noted compatible with acute pancreatitis. TG down trended to 831 on 2/23. Insulin drip was discontinued and switched to Lantus 10u and Humalog 4u TID + HSS. Patient was started on clear liquid diet. Gemfibrozil 600mg BID was started. Patient was stable from ICU standpoint and downgraded to medicine floor. Patient had fever of 101.7 on 2/23/2020 and had blood cultures drawn and was started on ceftriaxone and flagyl. CXR showed small bibasilar areas of atelectasis. Blood culture grew.. Hepatic US showed enlarged, fatty liver and no cholelithiasis or biliary ductal dilatation. Urinalysis was negative.         NOTE NOT COMPLETE Patient is 39 yr old M, with PMH of chronic ETOH abuse, chronic nicotine user through vaping, HTN, Type II DM, paraspinal hernia of back s/p surgery, who presented with complaints of mid epigastric pain for 1 day. Patient states he initially develop intermittent Rt shoulder pain since one week and then developed sudden severe epigastric pain x 1 day.         Lipase was noted to be 5900 and TG was noted to be over 3000. Patient was admitted to ICU for acute pancreatitis with hypertriglyceridemia. Patient was started on insulin drip. CT Abdomen and Pelvis w/ IV Cont showed inflammatory changes along the pancreas with extensions as noted compatible with acute pancreatitis. TG down trended to 831 on 2/23. Insulin drip was discontinued and switched to Lantus 10u and Humalog 4u TID + HSS. Patient was started on clear liquid diet. Gemfibrozil 600mg BID was started. Patient was stable from ICU standpoint and downgraded to medicine floor. Patient had fever of 101.7 on 2/23/2020 and had blood cultures drawn and was started on ceftriaxone and flagyl. CXR showed small bibasilar areas of atelectasis. Blood culture grew no organism. Hepatic US showed enlarged, fatty liver and no cholelithiasis or biliary ductal dilatation. Urinalysis was negative.         NOTE NOT COMPLETE Patient is 39 yr old M, with PMH of chronic ETOH abuse, chronic nicotine user through vaping, HTN, Type II DM, paraspinal hernia of back s/p surgery, who presented with complaints of mid epigastric pain for 1 day. Patient states he initially develop intermittent Rt shoulder pain since one week and then developed sudden severe epigastric pain x 1 day.         Lipase was noted to be 5900 and TG was noted to be over 3000. Patient was admitted to ICU for acute pancreatitis with hypertriglyceridemia. Patient was started on insulin drip. CT Abdomen and Pelvis w/ IV Cont showed inflammatory changes along the pancreas with extensions as noted compatible with acute pancreatitis. TG down trended to 831 on 2/23. Insulin drip was discontinued and switched to Lantus 10u and Humalog 4u TID + HSS. Patient was started on clear liquid diet. Gemfibrozil 600mg BID was started. Patient was stable from ICU standpoint and downgraded to medicine floor. Patient had fever of 101.7 on 2/23/2020 and had blood cultures drawn and was started on ceftriaxone and flagyl. CXR showed small bibasilar areas of atelectasis. Blood culture grew no organism. Hepatic US showed enlarged, fatty liver and no cholelithiasis or biliary ductal dilatation. Urinalysis was negative.         Patient to continue  insulin regimen as outpatient and continue lopid as outpatient. Patient is medically stable for discharge. Case was discussed with attending. Patient is 39 yr old M, with PMH of chronic ETOH abuse, chronic nicotine user through vaping, HTN, Type II DM, paraspinal hernia of back s/p surgery, who presented with complaints of mid epigastric pain for 1 day. Patient states he initially develop intermittent Rt shoulder pain since one week and then developed sudden severe epigastric pain x 1 day.         Lipase was noted to be 5900 and TG was noted to be over 3000. Patient was admitted to ICU for acute pancreatitis with hypertriglyceridemia. Patient was started on insulin drip. CT Abdomen and Pelvis w/ IV Cont showed inflammatory changes along the pancreas with extensions as noted compatible with acute pancreatitis. TG down trended to 831 on 2/23. Insulin drip was discontinued. Patient was seen by Endocrine, his HbA1c was >11, was put on  Lantus 10u and Humalog 4u TID + HSS. Patient was started on clear liquid diet. Gemfibrozil 600mg BID was started. Patient was stable from ICU standpoint and downgraded to medicine floor. Patient had fever of 101.7 on 2/23/2020 and had blood cultures drawn and was started on ceftriaxone and flagyl. CXR showed small bibasilar areas of atelectasis. Blood culture were negative. Hepatic US showed enlarged, fatty liver and no cholelithiasis or biliary ductal dilatation. Urinalysis, CXR was negative. Patient tolerated regular diet without any abdominal pain and fever free for more than 24 hrs.         Patient to continue  insulin regimen as outpatient and continue lopid as outpatient. Patient is medically stable for discharge. Case was discussed with attending.

## 2020-02-24 NOTE — DISCHARGE NOTE PROVIDER - NSDCCPCAREPLAN_GEN_ALL_CORE_FT
PRINCIPAL DISCHARGE DIAGNOSIS  Diagnosis: Acute pancreatitis  Assessment and Plan of Treatment:       SECONDARY DISCHARGE DIAGNOSES  Diagnosis: Type 2 diabetes mellitus with hyperglycemia, without long-term current use of insulin  Assessment and Plan of Treatment: Type 2 diabetes mellitus with hyperglycemia, without long-term current use of insulin    Diagnosis: Hypertriglyceridemia  Assessment and Plan of Treatment: Hypertriglyceridemia    Diagnosis: Alcohol abuse  Assessment and Plan of Treatment: Alcohol abuse    Diagnosis: Hypertension, unspecified type  Assessment and Plan of Treatment: Hypertension, unspecified type PRINCIPAL DISCHARGE DIAGNOSIS  Diagnosis: Acute pancreatitis  Assessment and Plan of Treatment: Your Lipase level was noted to be elevated at 5900 and Triglyceride level was noted to be over 3000. You were admitted to ICU for acute pancreatitis with hypertriglyceridemia. You were started on insulin drip. CT Abdomen and Pelvis w/ IV Cont showed inflammatory changes along the pancreas with extensions as noted compatible with acute pancreatitis. TG trended down to 831 on 2/23. Insulin drip was discontinued and switched to Lantus 10u and Humalog 4u 3 times a day + Humalog Sliding Sscale. You were started on clear liquid diet. Gemfibrozil 600mg twice a day was started. You were stable from ICU standpoint and downgraded to medicine floor. You had fever of 101.7 on 2/23/2020 and had blood cultures drawn and were started on ceftriaxone and flagyl. Chest X-Ray showed small bibasilar areas of atelectasis. Blood culture grew no organism. Hepatic Ultrasound showed enlarged, fatty liver and no cholelithiasis or biliary ductal dilatation. Urinalysis was negative.      SECONDARY DISCHARGE DIAGNOSES  Diagnosis: Hypertriglyceridemia  Assessment and Plan of Treatment: Your triglyceride level was noted to be over 3000. You were admitted to ICU for acute pancreatitis with hypertriglyceridemia. You were given insulin drip and continued with Gemfibrozil 600mg twice a day.    Diagnosis: Alcohol abuse  Assessment and Plan of Treatment: You have a history of alcohol abuse. Clinical [place] Withdrawl Assessment for Alcohol was continued. folic acid 1mg oral daily, thiamine 100mg oral daily and multivitamin was given.    Diagnosis: Type 2 diabetes mellitus with hyperglycemia, without long-term current use of insulin  Assessment and Plan of Treatment: Maintaining blood glucose level within normal range.  - You have a history of diabetes  - Your HbA1c is 11.9  - You should continue to take your medication regimen regularly as prescribed  - Please follow up with your primary care provider/endocrinologist within a week of discharge.  - You need to continue monitoring your blood sugar levels closely.  - Please maintain healthy lifestyle by eating healthy diabetic regimen, weight loss and exercise regularly as tolerated.  Make sure you get your HgA1c checked every three months.  If you take oral diabetes medications, check your blood glucose two times a day.  If you take insulin, check your blood glucose before meals and at bedtime.  It's important not to skip any meals.  Keep a log of your blood glucose results and always take it with you to your doctor appointments.  Keep a list of your current medications including injectables and over the counter medications and bring this medication list with you to all your doctor appointments.  If you have not seen your ophthalmologist this year call for appointment.  Check your feet daily for redness, sores, or openings. Do not self treat. If no improvement in two days call your primary care physician for an appointment.  Low blood sugar (hypoglycemia) is a blood sugar below 70mg/dl. Check your blood sugar if you feel signs/symptoms of hypoglycemia. If your blood sugar is below 70 take 15 grams of carbohydrates (ex 4 oz of apple juice, 3-4 glucose tablets, or 4-6 oz of regular soda) wait 15 minutes and repeat blood sugar to make sure it comes up above 70.  If your blood sugar is above 70 and you are due for a meal, have a meal.  If you are not due for a meal have a snack.  This snack helps keeps your blood sugar at a safe range.      Diagnosis: Hypertension, unspecified type  Assessment and Plan of Treatment: Blood Pressure Control , Please continue current medication regimen, and follow up with your PCP  - You have a history of Hypertension.   - Your Blood Pressure was adequately controlled without medication  - You blood pressure should be within 140-120/80-90.  - You should follow-up with your PCP within 1 week of your discharge for routine blood pressure monitoring at your next visit.  - Notify your doctor if you have any of the following symptoms:   (Dizziness, Lightheadedness, Blurry vision, Headache, Chest pain, Shortness of breath.)  - You should maintain healthy lifestyle by eating healthy low salt diet, avoid fatty food, weight loss, exercise regularly as tolerated 30 mins X 3 time per week.      Diagnosis: Transaminitis  Assessment and Plan of Treatment: Your Blood tests showed signs of liver damage. Liver Function Tests were monitored and the elevated liver enzymes resolved throughout your hospital stay. PRINCIPAL DISCHARGE DIAGNOSIS  Diagnosis: Acute pancreatitis  Assessment and Plan of Treatment: Your Lipase level was noted to be elevated at 5900 and Triglyceride level was noted to be over 3000. You were admitted to ICU for acute pancreatitis with hypertriglyceridemia. You were started on insulin drip. CT Abdomen and Pelvis w/ IV Cont showed inflammatory changes along the pancreas with extensions as noted compatible with acute pancreatitis. TG trended down to 831 on 2/23. Insulin drip was discontinued and switched to Lantus 10u and Humalog 4u 3 times a day + Humalog Sliding Scale. You were started on clear liquid diet. Gemfibrozil 600mg twice a day was started. You were stable from ICU standpoint and downgraded to medicine floor. You had fever of 101.7 on 2/23/2020 and had blood cultures drawn and were started on ceftriaxone and flagyl. Chest X-Ray showed small bibasilar areas of atelectasis. Blood culture grew no organism. Hepatic Ultrasound showed enlarged, fatty liver and no cholelithiasis or biliary ductal dilatation. Urinalysis was negative.  Recommended to continue insulin regimen as below: basaglar 10 units at bedtime and novolog 4 units three times a day before meals. Continue taking gemfibrozil 600mg twice a day for elevated triglycerides. Follow up with your PCP for further management. Follow up with endocrinologist as outpatient for management of DM. Advised to cease alcohol use to prevent future episodes of acute pancreatitis.      SECONDARY DISCHARGE DIAGNOSES  Diagnosis: Type 2 diabetes mellitus with hyperglycemia, without long-term current use of insulin  Assessment and Plan of Treatment: Maintaining blood glucose level within normal range.  - You have a history of diabetes  - Your HbA1c is 11.9  - You should continue to take your medication regimen regularly as prescribed  - Please follow up with your primary care provider/endocrinologist within a week of discharge.  - You need to continue monitoring your blood sugar levels closely.  - Please maintain healthy lifestyle by eating healthy diabetic regimen, weight loss and exercise regularly as tolerated.  Make sure you get your HgA1c checked every three months.  If you take oral diabetes medications, check your blood glucose two times a day.  If you take insulin, check your blood glucose before meals and at bedtime.  It's important not to skip any meals.  Keep a log of your blood glucose results and always take it with you to your doctor appointments.  Keep a list of your current medications including injectables and over the counter medications and bring this medication list with you to all your doctor appointments.  If you have not seen your ophthalmologist this year call for appointment.  Check your feet daily for redness, sores, or openings. Do not self treat. If no improvement in two days call your primary care physician for an appointment.  Low blood sugar (hypoglycemia) is a blood sugar below 70mg/dl. Check your blood sugar if you feel signs/symptoms of hypoglycemia. If your blood sugar is below 70 take 15 grams of carbohydrates (ex 4 oz of apple juice, 3-4 glucose tablets, or 4-6 oz of regular soda) wait 15 minutes and repeat blood sugar to make sure it comes up above 70.  If your blood sugar is above 70 and you are due for a meal, have a meal.  If you are not due for a meal have a snack.  This snack helps keeps your blood sugar at a safe range.      Diagnosis: Hypertriglyceridemia  Assessment and Plan of Treatment: Your triglyceride level was noted to be over 3000. You were admitted to ICU for acute pancreatitis with hypertriglyceridemia. You were given insulin drip and continued with Gemfibrozil 600mg twice a day. Continue taking gemfibrozil. Follow up with your PCP for further management.    Diagnosis: Alcohol abuse  Assessment and Plan of Treatment: You have a history of alcohol abuse. Recommended to cease excessive alcohol use. Recommended to continue taking folic acid and thiamine supplements.    Diagnosis: Hypertension, unspecified type  Assessment and Plan of Treatment: Recommended to have a low salt/DASH diet. Continue with blood pressure medications. Follow up with PCP for management of hypertension. PRINCIPAL DISCHARGE DIAGNOSIS  Diagnosis: Acute pancreatitis  Assessment and Plan of Treatment: Your Lipase level was noted to be elevated at 5900 and Triglyceride level was noted to be over 3000. You were admitted to ICU for acute pancreatitis with hypertriglyceridemia. You were started on insulin drip. CT Abdomen and Pelvis w/ IV Cont showed inflammatory changes along the pancreas with extensions as noted compatible with acute pancreatitis. TG trended down to 831 on 2/23. Insulin drip was discontinued and switched to Lantus 10u and Humalog 4u 3 times a day + Humalog Sliding Scale. You were started on clear liquid diet. Gemfibrozil 600mg twice a day was started. You were stable from ICU standpoint and downgraded to medicine floor. You had fever of 101.7 on 2/23/2020 and had blood cultures drawn and were started on ceftriaxone and flagyl. Chest X-Ray showed small bibasilar areas of atelectasis. Blood culture grew no organism. Hepatic Ultrasound showed enlarged, fatty liver and no cholelithiasis or biliary ductal dilatation. Urinalysis was negative.  Recommended to continue insulin regimen as below: basaglar 10 units at bedtime and novolog 4 units three times a day before meals. Continue taking gemfibrozil 600mg twice a day for elevated triglycerides. Follow up with your PCP for further management. Follow up with endocrinologist as outpatient for management of DM. Advised to cease alcohol use to prevent future episodes of acute pancreatitis.      SECONDARY DISCHARGE DIAGNOSES  Diagnosis: Type 2 diabetes mellitus with hyperglycemia, without long-term current use of insulin  Assessment and Plan of Treatment: Maintaining blood glucose level within normal range.  - You have a history of diabetes  - Your HbA1c is 11.9  - You should continue to take your medication regimen regularly as prescribed  - Please follow up with your primary care provider/endocrinologist within a week of discharge.  - You need to continue monitoring your blood sugar levels closely.  - Please maintain healthy lifestyle by eating healthy diabetic regimen, weight loss and exercise regularly as tolerated.  Make sure you get your HgA1c checked every three months.  If you take oral diabetes medications, check your blood glucose two times a day.  If you take insulin, check your blood glucose before meals and at bedtime.  It's important not to skip any meals.  Keep a log of your blood glucose results and always take it with you to your doctor appointments.  Keep a list of your current medications including injectables and over the counter medications and bring this medication list with you to all your doctor appointments.  If you have not seen your ophthalmologist this year call for appointment.  Check your feet daily for redness, sores, or openings. Do not self treat. If no improvement in two days call your primary care physician for an appointment.  Low blood sugar (hypoglycemia) is a blood sugar below 70mg/dl. Check your blood sugar if you feel signs/symptoms of hypoglycemia. If your blood sugar is below 70 take 15 grams of carbohydrates (ex 4 oz of apple juice, 3-4 glucose tablets, or 4-6 oz of regular soda) wait 15 minutes and repeat blood sugar to make sure it comes up above 70.  If your blood sugar is above 70 and you are due for a meal, have a meal.  If you are not due for a meal have a snack.  This snack helps keeps your blood sugar at a safe range.      Diagnosis: Hypertriglyceridemia  Assessment and Plan of Treatment: Your triglyceride level was noted to be over 3000. You were admitted to ICU for acute pancreatitis with hypertriglyceridemia. You were given insulin drip and continued with Gemfibrozil 600mg twice a day. Continue taking gemfibrozil. Follow up with your PCP for further management.    Diagnosis: Alcohol abuse  Assessment and Plan of Treatment: Recommended to cease excessive alcohol use. Recommended to continue taking folic acid and thiamine supplements.    Diagnosis: Hypertension, unspecified type  Assessment and Plan of Treatment: Recommended to have a low salt/DASH diet. Continue with blood pressure medications. Follow up with PCP for management of hypertension.

## 2020-02-24 NOTE — PROGRESS NOTE ADULT - PROBLEM SELECTOR PLAN 3
continue WA protocol.  folic acid 1mg PO daily.  thiamine 100mg IM daily. continue CIWA protocol.  folic acid 1mg PO daily.  thiamine 100mg PO daily  starting MV supplement as well

## 2020-02-24 NOTE — PROGRESS NOTE ADULT - PROBLEM SELECTOR PLAN 5
HBA1C- 11.9, nonadherent on metformin.  blood sugars better controlled   continue with humalog sliding scale and lantus 10units at bedside and humalog 4 units TID.  inform nursing staff to education patient about how to check blood glucose and home insulin regime.

## 2020-02-24 NOTE — PROGRESS NOTE ADULT - PROBLEM SELECTOR PLAN 1
DG from ICU over the weekend   on lopid currently    no abd pain  advanced diet to regular   monitor CMP  patient noted to have fever yesterday; patient currently on CTX and flagyl; f/u blood cx

## 2020-02-24 NOTE — DIETITIAN INITIAL EVALUATION ADULT. - OTHER INFO
Pt visited. Pt  DG from ICU on 2/23. Pt admitted with Acute pancreatitis, ETOH. TG 3130. Pt drinks 8-9 cans/ day. Per Pt H/O DM x 4 years but stopped taking  Medication x 1 year  Per MD ?? Pt NPO/CLEAR liquid= 4 days  Diet Just advanced to full liquid. Will Suggest DASH/ TLC to current diet RX. NKFA reported. Weight is  stable Ht 5 feet 7 inches, wt 210 LBS. Will F/u With Diet education when pt  is  stable. Pt DG from ICU Yesterday. Pt has not visited Dr since 1 year.

## 2020-02-25 ENCOUNTER — TRANSCRIPTION ENCOUNTER (OUTPATIENT)
Age: 40
End: 2020-02-25

## 2020-02-25 VITALS
DIASTOLIC BLOOD PRESSURE: 90 MMHG | HEART RATE: 77 BPM | TEMPERATURE: 98 F | SYSTOLIC BLOOD PRESSURE: 144 MMHG | OXYGEN SATURATION: 98 % | RESPIRATION RATE: 16 BRPM

## 2020-02-25 LAB
ALBUMIN SERPL ELPH-MCNC: 3 G/DL — LOW (ref 3.5–5)
ALP SERPL-CCNC: 90 U/L — SIGNIFICANT CHANGE UP (ref 40–120)
ALT FLD-CCNC: 27 U/L DA — SIGNIFICANT CHANGE UP (ref 10–60)
ANION GAP SERPL CALC-SCNC: 10 MMOL/L — SIGNIFICANT CHANGE UP (ref 5–17)
AST SERPL-CCNC: 12 U/L — SIGNIFICANT CHANGE UP (ref 10–40)
BASOPHILS # BLD AUTO: 0.04 K/UL — SIGNIFICANT CHANGE UP (ref 0–0.2)
BASOPHILS NFR BLD AUTO: 0.6 % — SIGNIFICANT CHANGE UP (ref 0–2)
BILIRUB SERPL-MCNC: 0.7 MG/DL — SIGNIFICANT CHANGE UP (ref 0.2–1.2)
BUN SERPL-MCNC: 13 MG/DL — SIGNIFICANT CHANGE UP (ref 7–18)
CALCIUM SERPL-MCNC: 8.7 MG/DL — SIGNIFICANT CHANGE UP (ref 8.4–10.5)
CHLORIDE SERPL-SCNC: 103 MMOL/L — SIGNIFICANT CHANGE UP (ref 96–108)
CO2 SERPL-SCNC: 24 MMOL/L — SIGNIFICANT CHANGE UP (ref 22–31)
CREAT SERPL-MCNC: 0.88 MG/DL — SIGNIFICANT CHANGE UP (ref 0.5–1.3)
EOSINOPHIL # BLD AUTO: 0.19 K/UL — SIGNIFICANT CHANGE UP (ref 0–0.5)
EOSINOPHIL NFR BLD AUTO: 2.8 % — SIGNIFICANT CHANGE UP (ref 0–6)
GLUCOSE BLDC GLUCOMTR-MCNC: 184 MG/DL — HIGH (ref 70–99)
GLUCOSE BLDC GLUCOMTR-MCNC: 191 MG/DL — HIGH (ref 70–99)
GLUCOSE BLDC GLUCOMTR-MCNC: 231 MG/DL — HIGH (ref 70–99)
GLUCOSE SERPL-MCNC: 207 MG/DL — HIGH (ref 70–99)
HCT VFR BLD CALC: 48.3 % — SIGNIFICANT CHANGE UP (ref 39–50)
HGB BLD-MCNC: 16.3 G/DL — SIGNIFICANT CHANGE UP (ref 13–17)
IMM GRANULOCYTES NFR BLD AUTO: 1 % — SIGNIFICANT CHANGE UP (ref 0–1.5)
LYMPHOCYTES # BLD AUTO: 1.07 K/UL — SIGNIFICANT CHANGE UP (ref 1–3.3)
LYMPHOCYTES # BLD AUTO: 15.6 % — SIGNIFICANT CHANGE UP (ref 13–44)
MAGNESIUM SERPL-MCNC: 2.4 MG/DL — SIGNIFICANT CHANGE UP (ref 1.6–2.6)
MCHC RBC-ENTMCNC: 31.1 PG — SIGNIFICANT CHANGE UP (ref 27–34)
MCHC RBC-ENTMCNC: 33.7 GM/DL — SIGNIFICANT CHANGE UP (ref 32–36)
MCV RBC AUTO: 92.2 FL — SIGNIFICANT CHANGE UP (ref 80–100)
MONOCYTES # BLD AUTO: 0.52 K/UL — SIGNIFICANT CHANGE UP (ref 0–0.9)
MONOCYTES NFR BLD AUTO: 7.6 % — SIGNIFICANT CHANGE UP (ref 2–14)
NEUTROPHILS # BLD AUTO: 4.99 K/UL — SIGNIFICANT CHANGE UP (ref 1.8–7.4)
NEUTROPHILS NFR BLD AUTO: 72.4 % — SIGNIFICANT CHANGE UP (ref 43–77)
NRBC # BLD: 0 /100 WBCS — SIGNIFICANT CHANGE UP (ref 0–0)
PHOSPHATE SERPL-MCNC: 2.9 MG/DL — SIGNIFICANT CHANGE UP (ref 2.5–4.5)
PLATELET # BLD AUTO: 218 K/UL — SIGNIFICANT CHANGE UP (ref 150–400)
POTASSIUM SERPL-MCNC: 4 MMOL/L — SIGNIFICANT CHANGE UP (ref 3.5–5.3)
POTASSIUM SERPL-SCNC: 4 MMOL/L — SIGNIFICANT CHANGE UP (ref 3.5–5.3)
PROT SERPL-MCNC: 7.2 G/DL — SIGNIFICANT CHANGE UP (ref 6–8.3)
RBC # BLD: 5.24 M/UL — SIGNIFICANT CHANGE UP (ref 4.2–5.8)
RBC # FLD: 12.3 % — SIGNIFICANT CHANGE UP (ref 10.3–14.5)
SODIUM SERPL-SCNC: 137 MMOL/L — SIGNIFICANT CHANGE UP (ref 135–145)
WBC # BLD: 6.88 K/UL — SIGNIFICANT CHANGE UP (ref 3.8–10.5)
WBC # FLD AUTO: 6.88 K/UL — SIGNIFICANT CHANGE UP (ref 3.8–10.5)

## 2020-02-25 PROCEDURE — 71045 X-RAY EXAM CHEST 1 VIEW: CPT

## 2020-02-25 PROCEDURE — 80048 BASIC METABOLIC PNL TOTAL CA: CPT

## 2020-02-25 PROCEDURE — 83036 HEMOGLOBIN GLYCOSYLATED A1C: CPT

## 2020-02-25 PROCEDURE — 84478 ASSAY OF TRIGLYCERIDES: CPT

## 2020-02-25 PROCEDURE — 93005 ELECTROCARDIOGRAM TRACING: CPT

## 2020-02-25 PROCEDURE — 82607 VITAMIN B-12: CPT

## 2020-02-25 PROCEDURE — 82306 VITAMIN D 25 HYDROXY: CPT

## 2020-02-25 PROCEDURE — 74177 CT ABD & PELVIS W/CONTRAST: CPT

## 2020-02-25 PROCEDURE — 80053 COMPREHEN METABOLIC PANEL: CPT

## 2020-02-25 PROCEDURE — 85610 PROTHROMBIN TIME: CPT

## 2020-02-25 PROCEDURE — 99239 HOSP IP/OBS DSCHRG MGMT >30: CPT

## 2020-02-25 PROCEDURE — 82746 ASSAY OF FOLIC ACID SERUM: CPT

## 2020-02-25 PROCEDURE — 85730 THROMBOPLASTIN TIME PARTIAL: CPT

## 2020-02-25 PROCEDURE — 97161 PT EVAL LOW COMPLEX 20 MIN: CPT

## 2020-02-25 PROCEDURE — 76705 ECHO EXAM OF ABDOMEN: CPT

## 2020-02-25 PROCEDURE — 80061 LIPID PANEL: CPT

## 2020-02-25 PROCEDURE — 83735 ASSAY OF MAGNESIUM: CPT

## 2020-02-25 PROCEDURE — 99285 EMERGENCY DEPT VISIT HI MDM: CPT | Mod: 25

## 2020-02-25 PROCEDURE — 85027 COMPLETE CBC AUTOMATED: CPT

## 2020-02-25 PROCEDURE — 81001 URINALYSIS AUTO W/SCOPE: CPT

## 2020-02-25 PROCEDURE — 83880 ASSAY OF NATRIURETIC PEPTIDE: CPT

## 2020-02-25 PROCEDURE — 82962 GLUCOSE BLOOD TEST: CPT

## 2020-02-25 PROCEDURE — 83690 ASSAY OF LIPASE: CPT

## 2020-02-25 PROCEDURE — 84484 ASSAY OF TROPONIN QUANT: CPT

## 2020-02-25 PROCEDURE — 87040 BLOOD CULTURE FOR BACTERIA: CPT

## 2020-02-25 PROCEDURE — 36415 COLL VENOUS BLD VENIPUNCTURE: CPT

## 2020-02-25 PROCEDURE — 87641 MR-STAPH DNA AMP PROBE: CPT

## 2020-02-25 PROCEDURE — 87640 STAPH A DNA AMP PROBE: CPT

## 2020-02-25 PROCEDURE — 84100 ASSAY OF PHOSPHORUS: CPT

## 2020-02-25 RX ORDER — ERGOCALCIFEROL 1.25 MG/1
1 CAPSULE ORAL
Qty: 4 | Refills: 0
Start: 2020-02-25 | End: 2020-03-25

## 2020-02-25 RX ORDER — INSULIN ASPART 100 [IU]/ML
4 INJECTION, SOLUTION SUBCUTANEOUS
Qty: 2 | Refills: 0
Start: 2020-02-25 | End: 2020-03-25

## 2020-02-25 RX ORDER — THIAMINE MONONITRATE (VIT B1) 100 MG
1 TABLET ORAL
Qty: 30 | Refills: 1
Start: 2020-02-25 | End: 2020-04-24

## 2020-02-25 RX ORDER — ISOPROPYL ALCOHOL, BENZOCAINE .7; .06 ML/ML; ML/ML
1 SWAB TOPICAL
Qty: 100 | Refills: 1
Start: 2020-02-25 | End: 2020-04-14

## 2020-02-25 RX ORDER — GEMFIBROZIL 600 MG
1 TABLET ORAL
Qty: 0 | Refills: 0 | DISCHARGE
Start: 2020-02-25

## 2020-02-25 RX ORDER — FOLIC ACID 0.8 MG
1 TABLET ORAL
Qty: 30 | Refills: 1
Start: 2020-02-25 | End: 2020-04-24

## 2020-02-25 RX ORDER — INSULIN GLARGINE 100 [IU]/ML
10 INJECTION, SOLUTION SUBCUTANEOUS
Qty: 2 | Refills: 0
Start: 2020-02-25 | End: 2020-03-25

## 2020-02-25 RX ORDER — GEMFIBROZIL 600 MG
1 TABLET ORAL
Qty: 60 | Refills: 1
Start: 2020-02-25 | End: 2020-04-24

## 2020-02-25 RX ADMIN — PANTOPRAZOLE SODIUM 40 MILLIGRAM(S): 20 TABLET, DELAYED RELEASE ORAL at 11:46

## 2020-02-25 RX ADMIN — CHLORHEXIDINE GLUCONATE 1 APPLICATION(S): 213 SOLUTION TOPICAL at 11:48

## 2020-02-25 RX ADMIN — Medication 100 MILLIGRAM(S): at 11:46

## 2020-02-25 RX ADMIN — Medication 100 MILLIGRAM(S): at 05:14

## 2020-02-25 RX ADMIN — Medication 2: at 11:52

## 2020-02-25 RX ADMIN — Medication 600 MILLIGRAM(S): at 06:40

## 2020-02-25 RX ADMIN — ENOXAPARIN SODIUM 40 MILLIGRAM(S): 100 INJECTION SUBCUTANEOUS at 11:45

## 2020-02-25 RX ADMIN — Medication 1 TABLET(S): at 11:46

## 2020-02-25 RX ADMIN — MUPIROCIN 1 APPLICATION(S): 20 OINTMENT TOPICAL at 17:10

## 2020-02-25 RX ADMIN — Medication 4 UNIT(S): at 17:10

## 2020-02-25 RX ADMIN — Medication 600 MILLIGRAM(S): at 17:11

## 2020-02-25 RX ADMIN — Medication 4 UNIT(S): at 08:29

## 2020-02-25 RX ADMIN — Medication 1: at 17:10

## 2020-02-25 RX ADMIN — Medication 1: at 08:29

## 2020-02-25 RX ADMIN — MUPIROCIN 1 APPLICATION(S): 20 OINTMENT TOPICAL at 05:14

## 2020-02-25 RX ADMIN — Medication 1 MILLIGRAM(S): at 11:46

## 2020-02-25 RX ADMIN — Medication 4 UNIT(S): at 11:53

## 2020-02-25 NOTE — PROGRESS NOTE ADULT - ASSESSMENT
Patient is 39 yr old M with PMH of chronic ETOH abuse (drinks 8-9 cans daily, last intake 3 days ago), chronic nicotine user through vaping, Htn, Type II DM ( stopped taking medications since 1 year), paraspinal hernia of back s/p surgery presented with complain of mid epigastric pain since 1 day. Found to have hypertriglyceridemia /pancreatitis and un cont dm. Pt self d/c metformin a year ago.      Problem/Recommendation - 1:  Problem: Hypertriglyceridemia. Recommendation: with pancreatitis due to etoh abuse  agree with lantus 10 units and humalod 4 units TID on discharge  Continue lopid 600 bid  tolerating regular diet     Problem/Recommendation - 2:  ·  Problem: Diabetes.  Recommendation: un cont due to non compliance with meds and diet  rec insulin tx - d/w pt and his fiance at the bedside   dm teaching.  blood sugar level is better controlled. Patient is 39 yr old M with PMH of chronic ETOH abuse (drinks 8-9 cans daily, last intake 3 days ago), chronic nicotine user through vaping, Htn, Type II DM ( stopped taking medications since 1 year), paraspinal hernia of back s/p surgery presented with complain of mid epigastric pain since 1 day. Found to have hypertriglyceridemia /pancreatitis and un cont dm. Pt self d/c metformin a year ago.      Problem/Recommendation - 1:  Problem: Hypertriglyceridemia. Recommendation: with pancreatitis due to etoh abuse  agree with lantus 10 units and humalod 4 units TID on discharge  Continue lopid 600 bid  tolerating regular diet.     Problem/Recommendation - 2:  ·  Problem: Diabetes.  Recommendation: un cont due to non compliance with meds and diet  rec insulin tx as above - d/w pt and his fiance at the bedside   dm teaching.  blood sugar level is better controlled.

## 2020-02-25 NOTE — PROGRESS NOTE ADULT - SUBJECTIVE AND OBJECTIVE BOX
Interval Events: Patient seen and examined at bedside. Denies any abdominal pain, tolerating regular diet. blood sugar are well controlled.   DC planning per primary team.      Allergies    penicillin (Rash)    Intolerances      Endocrine/Metabolic Medications:  gemfibrozil 600 milliGRAM(s) Oral two times a day  insulin glargine Injectable (LANTUS) 10 Unit(s) SubCutaneous at bedtime  insulin lispro (HumaLOG) corrective regimen sliding scale   SubCutaneous three times a day before meals  insulin lispro (HumaLOG) corrective regimen sliding scale   SubCutaneous at bedtime  insulin lispro Injectable (HumaLOG) 4 Unit(s) SubCutaneous three times a day with meals      Vital Signs Last 24 Hrs  T(C): 36.6 (25 Feb 2020 05:01), Max: 37.3 (24 Feb 2020 10:54)  T(F): 97.9 (25 Feb 2020 05:01), Max: 99.1 (24 Feb 2020 10:54)  HR: 81 (25 Feb 2020 05:01) (81 - 90)  BP: 138/89 (25 Feb 2020 05:01) (107/64 - 145/90)  BP(mean): --  RR: 16 (25 Feb 2020 05:01) (16 - 18)  SpO2: 98% (25 Feb 2020 05:01) (97% - 98%)      PHYSICAL EXAM  All physical exam findings normal, except those marked:  General:	Alert, active, cooperative, NAD, well hydrated  .		[] Abnormal:  Neck		Normal: supple, no cervical adenopathy, no palpable thyroid  .		[] Abnormal:  Cardiovascular	Normal: regular rate, normal S1, S2, no murmurs  .		[] Abnormal:  Respiratory	Normal: no chest wall deformity, normal respiratory pattern, CTA B/L  .		[] Abnormal:  Abdominal	Normal: soft, ND, NT, bowel sounds present, no masses, no organomegaly  .		[] Abnormal:  		Normal normal genitalia, testes descended, circumcised/uncircumcised  .		Bina stage:			Breast bina:  .		Menstrual history:  .		[] Abnormal:  Extremities	Normal: FROM x4  .		[] Abnormal:  Skin		Normal: intact and not indurated, no rash, no acanthosis nigricans  .		[] Abnormal:  Neurologic	Normal: grossly intact  .		[] Abnormal:    LABS                        16.3   6.88  )-----------( 218      ( 25 Feb 2020 06:34 )             48.3                               137    |  103    |  13                  Calcium: 8.7   / iCa: x      (02-25 @ 06:34)    ----------------------------<  207       Magnesium: 2.4                              4.0     |  24     |  0.88             Phosphorous: 2.9      TPro  7.2    /  Alb  3.0    /  TBili  0.7    /  DBili  x      /  AST  12     /  ALT  27     /  AlkPhos  90     25 Feb 2020 06:34    CAPILLARY BLOOD GLUCOSE      POCT Blood Glucose.: 184 mg/dL (25 Feb 2020 08:14)  POCT Blood Glucose.: 189 mg/dL (24 Feb 2020 21:42)  POCT Blood Glucose.: 172 mg/dL (24 Feb 2020 16:50)  POCT Blood Glucose.: 190 mg/dL (24 Feb 2020 11:53)        Assesment/plan

## 2020-02-28 LAB
CULTURE RESULTS: SIGNIFICANT CHANGE UP
CULTURE RESULTS: SIGNIFICANT CHANGE UP
SPECIMEN SOURCE: SIGNIFICANT CHANGE UP
SPECIMEN SOURCE: SIGNIFICANT CHANGE UP

## 2020-07-01 PROBLEM — F10.10 ALCOHOL ABUSE, UNCOMPLICATED: Chronic | Status: ACTIVE | Noted: 2020-02-22

## 2020-07-01 PROBLEM — E11.9 TYPE 2 DIABETES MELLITUS WITHOUT COMPLICATIONS: Chronic | Status: ACTIVE | Noted: 2020-02-22

## 2020-07-06 ENCOUNTER — APPOINTMENT (OUTPATIENT)
Dept: INTERNAL MEDICINE | Facility: CLINIC | Age: 40
End: 2020-07-06

## 2020-07-08 ENCOUNTER — APPOINTMENT (OUTPATIENT)
Dept: INTERNAL MEDICINE | Facility: CLINIC | Age: 40
End: 2020-07-08
Payer: MEDICAID

## 2020-07-08 ENCOUNTER — APPOINTMENT (OUTPATIENT)
Dept: INTERNAL MEDICINE | Facility: CLINIC | Age: 40
End: 2020-07-08

## 2020-07-08 VITALS
DIASTOLIC BLOOD PRESSURE: 104 MMHG | HEART RATE: 89 BPM | WEIGHT: 217 LBS | SYSTOLIC BLOOD PRESSURE: 155 MMHG | BODY MASS INDEX: 34.06 KG/M2 | OXYGEN SATURATION: 98 % | HEIGHT: 67 IN | TEMPERATURE: 97.5 F

## 2020-07-08 DIAGNOSIS — L60.0 INGROWING NAIL: ICD-10-CM

## 2020-07-08 DIAGNOSIS — Z86.19 PERSONAL HISTORY OF OTHER INFECTIOUS AND PARASITIC DISEASES: ICD-10-CM

## 2020-07-08 DIAGNOSIS — Z87.438 PERSONAL HISTORY OF OTHER DISEASES OF MALE GENITAL ORGANS: ICD-10-CM

## 2020-07-08 DIAGNOSIS — Z87.19 PERSONAL HISTORY OF OTHER DISEASES OF THE DIGESTIVE SYSTEM: ICD-10-CM

## 2020-07-08 DIAGNOSIS — R43.8 OTHER DISTURBANCES OF SMELL AND TASTE: ICD-10-CM

## 2020-07-08 DIAGNOSIS — Z86.59 PERSONAL HISTORY OF OTHER MENTAL AND BEHAVIORAL DISORDERS: ICD-10-CM

## 2020-07-08 DIAGNOSIS — B36.0 PITYRIASIS VERSICOLOR: ICD-10-CM

## 2020-07-08 DIAGNOSIS — B35.3 TINEA PEDIS: ICD-10-CM

## 2020-07-08 DIAGNOSIS — Z87.898 PERSONAL HISTORY OF OTHER SPECIFIED CONDITIONS: ICD-10-CM

## 2020-07-08 LAB
25(OH)D3 SERPL-MCNC: 13.4 NG/ML
ALBUMIN SERPL ELPH-MCNC: 5.1 G/DL
ALP BLD-CCNC: 91 U/L
ALT SERPL-CCNC: 52 U/L
AMYLASE/CREAT SERPL: 44 U/L
ANION GAP SERPL CALC-SCNC: 16 MMOL/L
AST SERPL-CCNC: 41 U/L
BILIRUB SERPL-MCNC: 0.4 MG/DL
BUN SERPL-MCNC: 16 MG/DL
CALCIUM SERPL-MCNC: 9.9 MG/DL
CHLORIDE SERPL-SCNC: 100 MMOL/L
CHOLEST SERPL-MCNC: 248 MG/DL
CHOLEST/HDLC SERPL: 10.3 RATIO
CO2 SERPL-SCNC: 20 MMOL/L
CREAT SERPL-MCNC: 0.77 MG/DL
ESTIMATED AVERAGE GLUCOSE: 226 MG/DL
FOLATE SERPL-MCNC: >20 NG/ML
FRUCTOSAMINE SERPL-MCNC: 451 UMOL/L
GLUCOSE SERPL-MCNC: 218 MG/DL
HBA1C MFR BLD HPLC: 9.5 %
HDLC SERPL-MCNC: 24 MG/DL
LDLC SERPL CALC-MCNC: NORMAL MG/DL
LPL SERPL-CCNC: 47 U/L
POTASSIUM SERPL-SCNC: 3.9 MMOL/L
PROT SERPL-MCNC: 7.7 G/DL
SODIUM SERPL-SCNC: 136 MMOL/L
TRIGL SERPL-MCNC: 1284 MG/DL
VIT B12 SERPL-MCNC: 605 PG/ML

## 2020-07-08 PROCEDURE — 99395 PREV VISIT EST AGE 18-39: CPT | Mod: 25

## 2020-07-08 PROCEDURE — 99215 OFFICE O/P EST HI 40 MIN: CPT | Mod: 25

## 2020-07-08 RX ORDER — METFORMIN HYDROCHLORIDE 500 MG/1
500 TABLET, EXTENDED RELEASE ORAL
Qty: 1 | Refills: 1 | Status: DISCONTINUED | COMMUNITY
Start: 2017-04-26 | End: 2020-07-08

## 2020-07-08 RX ORDER — OMEGA-3-ACID ETHYL ESTERS CAPSULES 1 G/1
1 CAPSULE, LIQUID FILLED ORAL DAILY
Qty: 120 | Refills: 3 | Status: DISCONTINUED | COMMUNITY
Start: 2017-04-26 | End: 2020-07-08

## 2020-07-08 RX ORDER — CHOLECALCIFEROL (VITAMIN D3) 1250 MCG
1.25 MG CAPSULE ORAL
Qty: 4 | Refills: 2 | Status: DISCONTINUED | COMMUNITY
Start: 2017-04-26 | End: 2020-07-08

## 2020-07-08 RX ORDER — PRAVASTATIN SODIUM 10 MG/1
10 TABLET ORAL
Qty: 1 | Refills: 3 | Status: DISCONTINUED | COMMUNITY
Start: 2017-04-26 | End: 2020-07-08

## 2020-07-08 NOTE — COUNSELING
[Sleep ___ hours/day] : Sleep [unfilled] hours/day [Engage in a relaxing activity] : Engage in a relaxing activity [Plan in advance] : Plan in advance [Potential consequences of obesity discussed] : Potential consequences of obesity discussed [Structured Weight Management Program suggested:] : Structured weight management program suggested [Benefits of weight loss discussed] : Benefits of weight loss discussed [Encouraged to increase physical activity] : Encouraged to increase physical activity [Encouraged to maintain food diary] : Encouraged to maintain food diary [Weigh Self Weekly] : weigh self weekly [Encouraged to use exercise tracking device] : Encouraged to use exercise tracking device [Target Wt Loss Goal ___] : Weight Loss Goals: Target weight loss goal [unfilled] lbs [____ min/wk Activity] : [unfilled] min/wk activity [Decrease Portions] : decrease portions [Keep Food Diary] : keep food diary [None] : None [Good understanding] : Patient has a good understanding of lifestyle changes and steps needed to achieve self management goal [FreeTextEntry1] : diabetic  [de-identified] : diet exercise  [FreeTextEntry2] : 139-159  ideal  weight 217 bmi 33

## 2020-07-08 NOTE — ASSESSMENT
[FreeTextEntry1] : health  he needs vaccinations  he is up to date with eye and dental .  \par 2 bmi 33  Weight loss, exercise, and diet control were discussed and are highly encouraged. Treatment options were given such as, aqua therapy, and contacting a nutritionist. Recommended to use the elliptical, stationary bike, less use of treadmill. Mindful eating was explained to the patient Obesity is associated with worsening asthma, shortness of breath, and potential for cardiac disease, diabetes, and other underlying medical conditions.\par  risks of obesity and need for diet and eating healthy Obesity is a complex disorder involving an excessive amount of body fat. Obesity isn't just a cosmetic concern. It increases your risk of diseases and health problems, such as heart disease, diabetes and high blood pressure.\par \par Being extremely obese means you are especially likely to have health problems related to your weight.\par \par \par The good news is that even modest weight loss can improve or prevent the health problems associated with obesity. Dietary changes, increased physical activity and behavior changes can help you lose weight. Prescription medications and weight-loss surgery are additional options for treating obesity.\par \par \par \par \par Symptoms\par \par Obesity is diagnosed when your body mass index (BMI) is 30 or higher. Your body mass index is calculated by dividing your weight in kilograms (kg) by your height in meters (m) squared. \par \par \par BMI\par \par Weight status\par \par \par Below 18.5 Underweight \par 18.5-24.9 Normal \par 25.0-29.9 Overweight \par 30.0-34.9 Obese (Class I) \par 35.0-39.9 Obese (Class II) \par 40.0 and higher Extreme obesity (Class III) \par \par For most people, BMI provides a reasonable estimate of body fat. However, BMI doesn't directly measure body fat, so some people, such as muscular athletes, may have a BMI in the obese category even though they don't have excess body fat. Ask your doctor if your BMI is a problem. \par \par When to see a doctor\par \par If you think you may be obese, and especially if you're concerned about weight-related health problems, see your doctor or health care provider. You and your provider can evaluate your health risks and discuss your weight-loss options. \par \par Request an Appointment at [facility]\par \par Causes\par \par Although there are genetic, behavioral and hormonal influences on body weight, obesity occurs when you take in more calories than you burn through exercise and normal daily activities. Your body stores these excess calories as fat.\par \par Obesity can sometimes be traced to a medical cause, such as Prader-Willi syndrome, Cushing's syndrome, and other diseases and conditions. However, these disorders are rare and, in general, the principal causes of obesity are:\par •Inactivity. If you're not very active, you don't burn as many calories. With a sedentary lifestyle, you can easily take in more calories every day than you use through exercise and normal daily activities.\par •Unhealthy diet and eating habits. Weight gain is inevitable if you regularly eat more calories than you burn. And most Americans' diets are too high in calories and are full of fast food and high-calorie beverages.\par \par Risk factors\par \par Obesity usually results from a combination of causes and contributing factors, including:\par •Genetics. Your genes may affect the amount of body fat you store, and where that fat is distributed. Genetics may also play a role in how efficiently your body converts food into energy and how your body burns calories during exercise.\par •Family lifestyle. Obesity tends to run in families. If one or both of your parents are obese, your risk of being obese is increased. That's not just because of genetics. Family members tend to share similar eating and activity habits.\par •Inactivity. If you're not very active, you don't burn as many calories. With a sedentary lifestyle, you can easily take in more calories every day than you burn through exercise and routine daily activities. Having medical problems, such as arthritis, can lead to decreased activity, which contributes to weight gain.\par •Unhealthy diet. A diet that's high in calories, lacking in fruits and vegetables, full of fast food, and laden with high-calorie beverages and oversized portions contributes to weight gain.\par •Medical problems. In some people, obesity can be traced to a medical cause, such as Prader-Willi syndrome, Cushing's syndrome and other conditions. Medical problems, such as arthritis, also can lead to decreased activity, which may result in weight gain.\par •Certain medications. Some medications can lead to weight gain if you don't compensate through diet or activity. These medications include some antidepressants, anti-seizure medications, diabetes medications, antipsychotic medications, steroids and beta blockers.\par •Social and economic issues. Research has linked social and economic factors to obesity. Avoiding obesity is difficult if you don't have safe areas to exercise. Similarly, you may not have been taught healthy ways of cooking, or you may not have money to buy healthier foods. In addition, the people you spend time with may influence your weight — you're more likely to become obese if you have obese friends or relatives.\par •Age. Obesity can occur at any age, even in young children. But as you age, hormonal changes and a less active lifestyle increase your risk of obesity. In addition, the amount of muscle in your body tends to decrease with age. This lower muscle mass leads to a decrease in metabolism. These changes also reduce calorie needs, and can make it harder to keep off excess weight. If you don't consciously control what you eat and become more physically active as you age, you'll likely gain weight.\par •Pregnancy. During pregnancy, a woman's weight necessarily increases. Some women find this weight difficult to lose after the baby is born. This weight gain may contribute to the development of obesity in women.\par •Quitting smoking. Quitting smoking is often associated with weight gain. And for some, it can lead to enough weight gain that the person becomes obese. In the long run, however, quitting smoking is still a greater benefit to your health than continuing to smoke.\par •Lack of sleep. Not getting enough sleep or getting too much sleep can cause changes in hormones that increase your appetite. You may also crave foods high in calories and carbohydrates, which can contribute to weight gain.\par \par Even if you have one or more of these risk factors, it doesn't mean that you're destined to become obese. You can counteract most risk factors through diet, physical activity and exercise, and behavior changes.\par \par Complications\par \par If you're obese, you're more likely to develop a number of potentially serious health problems, including:\par •High triglycerides and low high-density lipoprotein (HDL) cholesterol\par •Type 2 diabetes\par •High blood pressure\par •Metabolic syndrome — a combination of high blood sugar, high blood pressure, high triglycerides and low HDL cholesterol\par •Heart disease\par •Stroke\par •Cancer, including cancer of the uterus, cervix, endometrium, ovaries, breast, colon, rectum, esophagus, liver, gallbladder, pancreas, kidney and prostate\par •Breathing disorders, including sleep apnea, a potentially serious sleep disorder in which breathing repeatedly stops and starts\par •Gallbladder disease\par •Gynecological problems, such as infertility and irregular periods\par •Erectile dysfunction and sexual health issues\par •Nonalcoholic fatty liver disease, a condition in which fat builds up in the liver and can cause inflammation or scarring\par •Osteoarthritis\par \par Quality of life\par \par When you're obese, your overall quality of life may be diminished. You may not be able to do things you used to do, such as participating in enjoyable activities. You may avoid public places. Obese people may even encounter discrimination.\par \par Other weight-related issues that may affect your quality of life include:\par •Depression\par •Disability\par •Sexual problems\par •Shame and guilt\par •Social isolation\par •Lower work achievement\par \par Prevention\par \par Whether you're at risk of becoming obese, currently overweight or at a healthy weight, you can take steps to prevent unhealthy weight gain and related health problems. Not surprisingly, the steps to prevent weight gain are the same as the steps to lose weight: daily exercise, a healthy diet, and a long-term commitment to watch what you eat and drink.\par •Exercise regularly. You need to get 150 to 300 minutes of moderate-intensity activity a week to prevent weight gain. Moderately intense physical activities include fast walking and swimming.\par •Follow a healthy eating plan. Focus on low-calorie, nutrient-dense foods, such as fruits, veg\par 3 diabetes- ---The following has been discussed:---\par -Targets for weight and HgA1c have been discussed with patient \par -FS goals have been reviewed with the patient in detail:\par AM <130 post meal<160-180\par -Diet and weight goals have been discussed with the patient in detail.\par -The importance of exercise in the treatment of diabetes has been discussed \par with the patient in detail.\par -Extensive dietary advice provided to patient and the need to avoid concentrated \par sweets/simple carbohydrates and to ensure to consume protein with each meal. \par -Patient instructed to limit carbohydrates to 60 gms per meal and 15 gms per \par snacks. \par -Patient to keep a blood sugar log to check fasting and before meals\par -Patient instructed on importance of daily feet inspection and to reports any \par open lesions to physician promptly\par \par 4. Hypertriglyceridemia -  Hyperlipedemia exercise weight loss low fat diet.  If you've been keeping an eye on your blood pressure and cholesterol levels, there's something else you might need to monitor: your triglycerides. Having a high level of triglycerides, a type of fat (lipid) in your blood, can increase your risk of heart disease.\par \par However, the same lifestyle choices that promote overall health can help lower your triglycerides, too.\par \par Triglycerides are a type of fat (lipid) found in your blood. When you eat, your body converts any calories it doesn't need to use right away into triglycerides. The triglycerides are stored in your fat cells. Later, hormones release triglycerides for energy between meals. If you regularly eat more calories than you burn, particularly "easy" calories like carbohydrates and fats, you may have high triglycerides (hypertriglyceridemia).\par \par A simple blood test can reveal whether your triglycerides fall into a healthy range.\par •Normal - Less than 150 milligrams per deciliter (mg/dL), or less than 1.7 millimoles per liter (mmol/L)\par •Borderline high - 150 to 199 mg/dL (1.8 to 2.2 mmol/L)\par •High - 200 to 499 mg/dL (2.3 to 5.6 mmol/L)\par •Very high - 500 mg/dL or above (5.7 mmol/L or above)\par \par Your doctor will usually check for high triglycerides as part of a cholesterol test (sometimes called a lipid panel or lipid profile). You'll have to fast for nine to 12 hours before blood can be drawn for an accurate triglyceride measurement.\par \par Triglycerides and cholesterol are separate types of lipids that circulate in your blood. Triglycerides store unused calories and provide your body with energy, and cholesterol is used to build cells and certain hormones. Because triglycerides and cholesterol can't dissolve in blood, they circulate throughout your body with the help of proteins that transport the lipids (lipoproteins).\par \par Although it's unclear how, high triglycerides may contribute to hardening of the arteries or thickening of the artery walls (atherosclerosis) - which increases the risk of stroke, heart attack and heart disease. Extremely high triglycerides - for example, levels above 1000 mg/dL (11.29 mmol/L) - can also cause acute pancreatitis.\par \par High triglycerides are often a sign of other conditions that increase the risk of heart disease and stroke as well, including obesity and metabolic syndrome - a cluster of conditions that includes too much fat around the waist, high blood pressure, high triglycerides, high blood sugar and abnormal cholesterol levels.\par \par Sometimes high triglycerides are a sign of poorly controlled type 2 diabetes, low levels of thyroid hormones (hypothyroidism), liver or kidney disease, or rare genetic conditions that affect how your body converts fat to energy. High triglycerides could also be a side effect of taking medications such as beta blockers, birth control pills, diuretics or steroids.\par \par Healthy lifestyle choices are key:\par •Lose weight. If you're overweight, losing 5 to 10 pounds can help lower your triglycerides. Motivate yourself by focusing on the benefits of losing weight, such as more energy and improved health.\par •Cut back on calories. Remember that extra calories are converted to triglycerides and stored as fat. Reducing your calories will reduce triglycerides.\par •Avoid sugary and refined foods. Simple carbohydrates, such as sugar and foods made with white flour, can increase triglycerides.\par •Choose healthier fats. Trade saturated fat found in meats for healthier monounsaturated fat found in plants, such as olive, peanut and canola oils. Substitute fish high in omega-3 fatty acids - such as mackerel and salmon - for red meat.\par •Limit how much alcohol you drink. Alcohol is high in calories and sugar and has a particularly potent effect on triglycerides. Even small amounts of alcohol can raise triglyceride levels.\par •Exercise regularly. Aim for at least 30 minutes of physical activity on most or all days of the week. Regular exercise can lower triglycerides and boost "good" cholesterol. Take a brisk daily walk, swim laps or join an exercise group. If you don't have time to exercise for 30 minutes, try squeezing it in 10 minutes at a time. Take a short walk, climb the stairs at work, or try some situps or pushups as you watch television.\par \par If healthy lifestyle changes aren't enough to control high triglycerides, your doctor might recommend some of the following:\par •Statins. Your doctor might prescribe these cholesterol-lowering drugs if you also have low high-density lipoprotein (HDL, or "good") cholesterol; high low-density lipoprotein (LDL, or "bad") cholesterol; or if you have a history of blocked arteries or diabetes. Examples include atorvastatin (Lipitor) and simvastatin (Zocor). Muscle pain is a potential side effect.\par •Fish oils. Also known as omega-3 fatty acids, fish oil supplements can help lower your triglycerides. High doses are needed, however, so this option is often reserved for people who have triglyceride levels over 500 mg/dL (5.7 mmol/L). \par •Fibrates. Fibrate medications, such as fenofibrate (TriCor, Fenoglide, others) and gemfibrozil (Lopid), also can lower your triglyceride levels. Fibrates seem to work best in people who have triglyceride levels over 500 mg/dL (5.7 mmol/L). Fibrates may increase the risk of side effects when taken together with statins.\par •Niacin. Niacin, sometimes called nicotinic acid, can lower your triglycerides and your "bad" cholesterol (low-density lipoprotein, or LDL, cholesterol). It's typically reserved for people who have triglyceride levels over 500 mg/dL (5.7 mmol/L). Don't take over-the-counter niacin without talking to your doctor first. Niacin can interact with other medications and can cause significant side effects.\par \par If your doctor prescribes medication to lower your triglycerides, take the medication as prescribed. And remember the significance of the healthy lifestyle changes you've made. Medications can help - but lifestyle matters, too\par \par he understands dangers of this and pancreatitis  stroke heart disease  v ascualr disease . \par 5 hpn - he will be started on arb and discussed side effects and adding  hctz low dose  Your current weight is 141 lbs (139 lbs on 11/27/2020; 141 lbs on 3/20/19; 137 lbs on 12/4/18). Given current weight and height 4'9", your calculated body mass index (BMI) is 30.1 kg/sqm. Normal BMI is 18.5-25 kg/sqm. Thus current weight is in the obese class I category. Abdominal waist circumference is measured at the level of the umbilicus and is thus not a pants waist measurement. Your current abdominal waist circumference is 40 inches (36 inon 11/27/2020). Normal abdominal waist circumference is < 32 inches for women and < 37 inches for men.\par \par DIETARY SALT (SODIUM); DASH DIET AND BLOOD PRESSURE:\par To decrease the sodium in your diet: \par · Use fresh vegetables and foods as much as possible.\par · Avoid canned and processed foods. Cured meats such as bashir, ham, and sausages are high in salt.\par · Try using different herbs and spices in your cooking instead of salt.\par · In restaurants, avoid foods with sauces, cheese, and cured meats. Ask for low-sodium choices.\par To get more potassium in your diet, eat:\par · Bananas, fresh or dried apricots, peaches, citrus fruits, melons\par · Cauliflower, broccoli, tomatoes, carrots, raw spinach, beet greens, potatoes\par To get more magnesium in your diet, eat:\par · Whole grain foods, leafy green vegetables, dried fruits\par • Fish and seafood, poultry \par To get more calcium in your diet, eat:\par · Nonfat milk, yogurt, and low-fat cheeses \par · [place] and sardines\par · Cooked dried beans\par · Broccoli, kale, and bok armana\par · Tofu or soybean curd\par DASH stands for "dietary approaches to stop hypertension." The DASH diet is low in total and saturated fat. It is rich in fruits, vegetables, and low-fat dairy foods. The diet allows you to get natural fiber, calcium, and magnesium from food. It prevents or lowers high blood pressure. It can also help lower cholesterol in your blood. \par Don't change how you eat all at once. It's much more likely that you'll succeed if you make only one or two small changes at a time. Wait until those changes are a habit, then make a couple more changes. Some good starting steps include: \par Add one serving of vegetables to your meals at lunch and dinner. This is an easy way to help you get more vegetables in your diet. \par Have a piece of fruit as an afternoon or after-dinner snack. One glass of juice at breakfast is not enough fruit in your diet. \par Use half your usual amount of butter, margarine, or salad dressing. \par Buy nonfat salad dressing or nonfat sour cream.\par Follow this guide to select your menu of meals. The number of calories we want you to eat each day will tell you how many servings you can choose from each food group.\par Calories: 1600 2100 2600 3100 Servings Grains 6 7 ½ 10 ½ 12 ½ Vegetables 	 4 4 ½ 5 6 Fruit 4 5 5 6 Dairy (low-fat) 2 ½ 3 3 3 ½ Meat, poultry, fish ½ 1 ½ 2 2 ½ Nuts, seeds ½ ½ ½ 1 Fats and sweets 1 ½ 2 ½ 3 4\par Grains and grain products like breads and cereals provide energy, fiber and vitamins. Whole grains have more of these nutrients. One serving equals one of the following:\par Bagel, 1/2 medium; Barley, cooked 1/2 cup; Biscuit, country style 1 medium; Bread, whole wheat, white 1 slice; Cereals, cold or cooked, 1/2 cup; Cornbread, 1 medium piece; Crackers, larry, 2; Crackers, saltine, 4; Dinner roll, 1medium; English muffin, ½; Hamburger bun, ½; Muffin, 1 medium; Pancake, 1 medium; Pasta, 1/2 cup cooked; Ashley, 1/2 large or 1 small; Popcorn, 1 cup popped; Pretzels, 1 ounce; Rice, white, brown, or wild, 1/2 cup cooked; Tortillas, corn or flour, 1 medium; Waffle, 1 medium; Wheat germ, 1/4 cup; \par Vegetables are rich sources of potassium, magnesium, and fiber. One serving is 1/2 cup of any of the following cooked vegetables:\par Asparagus, Beans (green, yellow), Beets, Broccoli, [place] Sprouts, Carrots, Cauliflower, Jo, chicory, mustard and turnip (and other) greens, Corn, Kale, Lima beans, Mixed vegetables, Okra, Parsnips, Peas, green, Potatoes (1/2 medium or 1/2 cup mashed), Pumpkin, Rutabaga, Spaghetti or tomato sauce, Spinach, Squash (zucchini or yellow), Stewed tomatoes, Succotash, Sweet potatoes, Turnips, Yam \par Raw vegetables: Carrots,1/2 cup; Celery, 1/2 cup; Lettuce (katie, loose-leaf, green-leaf), 1 cup; Peppers, 1/2 cup; Spinach, 1 cup; Tomato, 1/2\par Fruits and fruit juices are important sources of potassium and magnesium. Fruits also contain fiber and are low in sodium and fat. One serving equals:\par Any fruit juice, # cup (6 ounces); Canned or frozen fruit, ½ cup (includes applesauce); Dried fruit, ¼ cup; \par Fresh fruit:\par Apple, 1 medium; Apricots, 2 medium; Banana, 1 medium; Berries, 1/2 cup; Melon, 1 wedge, or 1/2 cup; Cherries, 10; Grapefruit, 1/2; Grapes, 15; Kiwi, 1 medium; Isai, 1/2 small; Nectarine, 1 medium; Orange, 1 medium; Peach, 1 medium; Pear, 1 medium; Pineapple, 1/2 cup; Plums, 2 medium; Tangerine, 1 large\par Dairy foods provide protein and calcium. Use low-fat or nonfat dairy products to cut down on fat. One serving equals:\par Skim milk, 1 cup (8 ounces); 1% low fat milk, 1 cup (8 ounces); 2% low fat milk, 1 cup (8 ounces) nonfat dry milk powder (1/3 cup); Low-fat cottage cheese, 1 cup (8 ounces); Parmesan cheese, 1 tablespoon; Mozzarella cheese, part skim, 1/4 cup (1 ounce); Low-fat cheddar cheese, 11/2 ounces; Ricotta cheese, part skim milk or nonfat, 1/4 cup (11/2 ounces); Other low fat or nonfat cheeses (11/2 oz.); Low-fat or nonfat yogurt, fruit-flavored or plain, 1 cup (8 ounces)\par Low-fat or nonfat frozen yogurt, 1/2 cup (4 ounces); Note: People who can't digest dairy products can try taking lactase enzyme pills or drops (available at drug and grocery stores) when they eat dairy. There is also milk available with the enzyme already added. Or you can buy lactose-free milk.\par Meat, poultry, and fish are good sources of protein and magnesium. One serving equals:\par Lean meat including beef, veal, or pork, 3 ounces cooked; Skinless, white meat poultry including turkey, chicken, 3 ounces; Fish and shellfish, 3 ounces cooked; Low-fat luncheon meats, 1 ounce; Egg, 1 medium; Tofu, 3 ounces\par Note: Three ounces of cooked meat is about the size of a deck of cards.\par Nuts, seeds, and legumes are rich sources of energy, magnesium, potassium, protein and fiber. Nuts and seeds are also high in fat, so portions should be small.\par Almonds, 1/3 cup; Beans such as kidney, ruiz, and navy, 1/2 cup cooked; Chickpeas and lentils, 1/2 cup cooked; Cashews, 1/3 cup; Filberts, 1/3 cup; Mixed nuts, 1/3 cup; Peanut butter, 2 tablespoons; Peanuts, 1/3 cup; Sesame seeds, 2 tablespoons; Sunflower seeds, 2 tablespoons; Tofu, regular, 3 ounces; Walnuts, 1/3 cup \par Following the above diet will give you about 27% fat in your diet. The goal is to have 30% or less of the calories you eat each day be from fat. To meet that goal, do not eat more than 2-3 servings daily of added fat. Also try to limit sweets. One serving equals:\par Butter or margarine, 1 teaspoon; Mayonnaise, 1 teaspoon; Low-fat mayonnaise, 1 tablespoon; Salad dressing, 1 tablespoon; Low-fat salad dressing, 2 tablespoons; Oil, 1 teaspoon (use olive, canola, safflower, or other vegetable oils); Candy, hard, 3 pieces; Jelly or jam, 1 tablespoon); Jell-O, 1/2 cup; Jelly beans, 1/2 ounce; Maple syrup, 1 tablespoon; Popsicle, 1; Sherbet or nonfat or low-fat frozen yogurt, 1/2 cup; Sugar, 1 tablespoon; Sugared lemonade or fruit punch, 1 cup (8 ounces); Note: Try diet fruit-flavored gelatin or frozen, canned, or fresh fruit for dessert.\par \par Small amounts of alcohol may have benefits to the heart and blood pressure. However, excess use of alcohol can cause damage to the brain, liver and other organs. It can lead to high blood pressure. Drinking more than two drinks (15 ml) every day can raise your blood pressure. 15 ml of alcohol equals: \par • one 12-ounce bottle of beer \par • a half glass (5 ounces) of wine \par • 1 ounce (one shot) of 100 proof hard liquor\par \par \par 6. sleep apnea is associated with adverse clinical consequences which an affect most organ systems. Cardiovascular disease risk includes arrhythmias, atrial fibrillation, hypertension, coronary artery disease, and stroke. Metabolic disorders include diabetes type 2, non-alcoholic fatty liver disease. Mood disorder especially depression; and cognitive decline especially in the elderly. Associations with chronic reflux/Roth’s esophagus some but not all inclusive. \par -Reasons include arousal consistent with hypopnea; respiratory events most prominent in REM sleep or supine position; therefore sleep staging and body position are important for accurate diagnosis and estimation of AHI. he needs to return for full evaluation \par 7.  Fatty Liver: The patient denies any jaundice or pruritus. The patient denies any alcohol use. The patient denies taking large doses of nonsteroidal anti-inflammatory drugs or acetaminophen. The findings are suggestive of fatty liver. The patient and I had a long discussion regarding the risks of fatty liver progressing to cirrhosis. The patient was told of the possible increased risk of developing liver failure, cirrhosis, ascites, GI bleeding secondary to varices, hepatic encephalopathy, bleeding tendencies and liver cancer. The patient was told of the importance of follow-up. The patient was advised to follow up every 6 months for blood work and imaging studies. The patient agreed and will follow up. The patient was advised to lose weight. I recommend a trial of vitamin E supplementation for the fatty liver. If the liver enzymes remain elevated, the patient may require a trial of Pioglitazone for the fatty liver. I recommend avoid alcohol and hepato-toxic agents. The patient was also advised to avoid NSAIDs, Acetaminophen and any other hepatotoxic drugs. The patient was also advised not to share needles, razors, scissors, nail clippers, etc.. The patient is to continue close follow-up in our office for blood work and exams. If the liver enzymes remain elevated, the patient may require a CT guided liver biopsy to assess the liver parenchyma and for possible treatment. We had a long discussion regarding the risks and benefits of the procedure. The patient was told of the risks of bleeding, perforation, infections, emergency surgery and missing lesions. The patient agreed and will follow-up to reassess the symptoms.\par \par fibroscan , us of abd and weight loss exercise discussed with pt and risks .  \par \par \par

## 2020-07-08 NOTE — PHYSICAL EXAM
[Well Nourished] : well nourished [Well Developed] : well developed [Normal Voice Quality] : was normal [Conjunctiva] : the conjunctiva were normal in both eyes [PERRL] : pupils were equal in size, round, and reactive to light [EOM Intact] : extraocular movements were intact [Normal Appearance] : was normal in appearance [Neck Supple] : was supple [Rate ___] : at [unfilled] breaths per minute [Normal Rhythm/Effort] : normal respiratory rhythm and effort [Clear Bilaterally] : the lungs were clear to auscultation bilaterally [Normal to Percussion] : the lungs were normal to percussion [5th Left ICS - MCL] : palpated at the 5th LICS in the midclavicular line [Heart Rate ___] : [unfilled] bpm [Normal Rate] : normal [Normal S2] : normal S2 [Rhythm Regular] : regular [Normal S1] : normal S1 [No Pitting Edema] : no pitting edema present [No Murmur] : no murmurs heard [No Abnormalities] : the abdominal aorta was not enlarged and no bruit was heard [2+] : right 2+ [Examination Of The Breasts] : a normal appearance [No Discharge] : no discharge [No Mass] : no masses were palpated [Soft, Nontender] : the abdomen was soft and nontender [No HSM] : no hepatosplenomegaly noted [None] : no hernias were palpable [No Lymphangitis] : no lymphangitis observed [Normal Kyphosis] : normal kyphosis [No Visual Abnormalities] : no visible abnormalities [No Tenderness to Palpation] : no spine tenderness on palpation [No Scoliosis] : no scoliosis [Normal Lordosis] : normal lordosis [No Masses] : no masses [No Pain with ROM] : no pain with motion in any direction [Full ROM] : full ROM [Normal Station and Gait] : the gait and station were normal [Intact] : all sensory within normal limits bilaterally [Involuntary Movements] : no involuntary movements were seen [Normal Scalp] : inspection of the scalp showed no abnormalities [Normal Motor Tone] : the muscle tone was normal [Examination Of The Hair] : texture and distribution of hair was normal [Complexion Medium] : medium complexion [Normal Mental Status] : the patient's orientation, memory, attention, language and fund of knowledge were normal [Normal] : the sensory exam was normal [Appropriate] : appropriate [Comprehensive Foot Exam Normal] : Right and left foot were examined and both feet are normal. No ulcers in either foot. Toes are normal and with full ROM.  Normal tactile sensation with monofilament testing throughout both feet [Normal Verbal Skills] : a deficiency in verbal communication skills was noted [Normal Nonverbal Skills] : deficient nonverbal communication skills were noted [Enlarged Diffusely] : was not enlarged [JVP Elevated ___cm] : the JVP was not elevated [S3] : no S3 [S4] : no S4 [Lt] : no varicose veins of the left leg [Rt] : no varicose veins of the right leg [Right Carotid Bruit] : no bruit heard over the right carotid [Left Carotid Bruit] : no bruit heard over the left carotid [Left Femoral Bruit] : no bruit heard over the left femoral artery [Bruit] : no bruit heard [Right Femoral Bruit] : no bruit heard over the right femoral artery [Postauricular Lymph Nodes Enlarged Bilaterally] : nodes not enlarged [Submandibular Lymph Nodes Enlarged Bilaterally] : nodes not enlarged [Preauricular Lymph Nodes Enlarged Bilaterally] : nodes not enlarged [Cervical Lymph Nodes Enlarged Posterior Bilaterally] : nodes not enlarged [Submental Lymph Nodes Enlarged] : nodes not enlarged [Suboccipital Lymph Nodes Enlarged Bilaterally] : nodes not enlarged [Cervical Lymph Nodes Enlarged Anterior Bilaterally] : nodes not enlarged [Axillary Lymph Nodes Enlarged Bilaterally] : nodes not enlarged [Femoral Lymph Nodes Enlarged Bilaterally] : nodes not enlarged [Supraclavicular Lymph Nodes Enlarged Bilaterally] : nodes not enlarged [Epitrochlear Lymph Nodes Enlarged Bilaterally] : nodes not enlarged [Abnormal Color] : normal color and pigmentation [Skin Lesions 1] : no skin lesions were observed [Inguinal Lymph Nodes Enlarged Bilaterally] : nodes not enlarged [Impaired judgment] : intact judgment [Tattoo - Single] : no tattoos observed [Skin Turgor Decreased] : normal skin turgor [de-identified] : tongue normal teeth  in good repair  [Impaired Insight] : intact insight

## 2020-07-08 NOTE — HISTORY OF PRESENT ILLNESS
[FreeTextEntry1] : new pt transition  hospital  [de-identified] : pt is a 39 yr old man with hx of hyperlipidemia, diabetes, gerd and fatty liver who was recently admitted to hospital for pancreatitis.   he was admitted in Feb 23 and discharged 2/27/20 .  he had stopped his medication for  3 yrs and was not following up with me as his prior pcp.  he was not seeing an endocrinologist  either.  he has not taken the insulin for one week  since he ran out.  He had canceled prior appts to see me  as well.  he states he had insurance issues.   He presently doesn’t have sob chest pain fatigue or increased thrist but has increased urination.  he doesn’t have abd pain .  hospital Patient is 39 yr old M, with PMH of chronic ETOH abuse, chronic nicotine user\par through vaping, HTN, Type II DM, paraspinal hernia of back s/p surgery, who\par presented with complaints of mid epigastric pain for 1 day. Patient states he\par initially develop intermittent Rt shoulder pain since one week and then\par developed sudden severe epigastric pain x 1 day.\par \par Lipase was noted to be 5900 and TG was noted to be over 3000. Patient was\par admitted to ICU for acute pancreatitis with hypertriglyceridemia. Patient was\par started on insulin drip. CT Abdomen and Pelvis w/ IV Cont showed inflammatory\par changes along the pancreas with extensions as noted compatible with acute\par pancreatitis. TG down trended to 831 on 2/23. Insulin drip was discontinued.\par Patient was seen by Endocrine, his HbA1c was >11, was put on Lantus 10u and\par Humalog 4u TID + HSS. Patient was started on clear liquid diet. Gemfibrozil\par 600mg BID was started. Patient was stable from ICU standpoint and downgraded to\par medicine floor. Patient had fever of 101.7 on 2/23/2020 and had blood cultures\par drawn and was started on ceftriaxone and flagyl. CXR showed small bibasilar\par areas of atelectasis. Blood culture were negative. Hepatic US showed enlarged,\par fatty liver and no cholelithiasis or biliary ductal dilatation. Urinalysis, CXR\par was negative. Patient tolerated regular diet without any abdominal pain and\par fever free for more than 24 hrs.\par \par Patient to continue insulin regimen as outpatient and continue lopid as\par outpatient. Patient is medically stable for discharge. Case was discussed with\par attending.\par he also had triglycerides of 3000

## 2020-07-08 NOTE — HEALTH RISK ASSESSMENT
[Poor] : ~his/her~ current health as poor [Good] : ~his/her~  mood as  good [No falls in past year] : Patient reported no falls in the past year [No] : No [0] : 2) Feeling down, depressed, or hopeless: Not at all (0) [HIV Test offered] : HIV Test offered [Hepatitis C test offered] : Hepatitis C test offered [# of Members in Household ___] :  household currently consist of [unfilled] member(s) [With Family] : lives with family [None] : None [Unemployed] : unemployed [College] : College [] :  [# Of Children ___] : has [unfilled] children [Fully functional (using the telephone, shopping, preparing meals, housekeeping, doing laundry, using] : Fully functional and needs no help or supervision to perform IADLs (using the telephone, shopping, preparing meals, housekeeping, doing laundry, using transportation, managing medications and managing finances) [Fully functional (bathing, dressing, toileting, transferring, walking, feeding)] : Fully functional (bathing, dressing, toileting, transferring, walking, feeding) [Feels Safe at Home] : Feels safe at home [Smoke Detector] : smoke detector [Carbon Monoxide Detector] : carbon monoxide detector [Safety elements used in home] : safety elements used in home [Sunscreen] : uses sunscreen [Seat Belt] :  uses seat belt [FreeTextEntry1] : diabetes  [] : No [de-identified] : walking.   [de-identified] : diabetic diet , low fat diet  [Change in mental status noted] : No change in mental status noted [[id number]] : 0 [Language] : denies difficulty with language [Behavior] : denies difficulty with behavior [Learning/Retaining New Information] : denies difficulty learning/retaining new information [Reasoning] : denies difficulty with reasoning [Handling Complex Tasks] : denies difficulty handling complex tasks [Sexually Active] : not sexually active [Spatial Ability and Orientation] : denies difficulty with spatial ability and orientation [Reports changes in hearing] : Reports no changes in hearing [Reports changes in dental health] : Reports no changes in dental health [Reports changes in vision] : Reports no changes in vision [TB Exposure] : is not being exposed to tuberculosis [Travel to Developing Areas] : does not  travel to developing areas [Guns at Home] : no guns at home [de-identified] : last eye exam 1 month  [Caregiver Concerns] : does not have caregiver concerns [de-identified] : last dental 1 yr  [AdvancecareDate] : 07/20

## 2020-07-09 LAB
CREAT SPEC-SCNC: 232 MG/DL
HBV CORE IGG+IGM SER QL: NONREACTIVE
HBV SURFACE AB SER QL: REACTIVE
HBV SURFACE AG SER QL: NONREACTIVE
HCV AB SER QL: NONREACTIVE
HCV S/CO RATIO: 0.15 S/CO
HEPATITIS A IGG ANTIBODY: REACTIVE
HIV1+2 AB SPEC QL IA.RAPID: NONREACTIVE
MICROALBUMIN 24H UR DL<=1MG/L-MCNC: 45.9 MG/DL
MICROALBUMIN/CREAT 24H UR-RTO: 198 MG/G
SARS-COV-2 IGG SERPL IA-ACNC: 0.01 INDEX
SARS-COV-2 IGG SERPL QL IA: NEGATIVE

## 2020-07-10 LAB — ANA SER IF-ACNC: NEGATIVE

## 2020-07-14 LAB
C TRACH RRNA SPEC QL NAA+PROBE: NOT DETECTED
N GONORRHOEA RRNA SPEC QL NAA+PROBE: NOT DETECTED
SOURCE AMPLIFICATION: NORMAL

## 2020-08-03 ENCOUNTER — RX RENEWAL (OUTPATIENT)
Age: 40
End: 2020-08-03

## 2020-08-17 ENCOUNTER — APPOINTMENT (OUTPATIENT)
Dept: INTERNAL MEDICINE | Facility: CLINIC | Age: 40
End: 2020-08-17
Payer: MEDICAID

## 2020-08-17 VITALS
BODY MASS INDEX: 33.59 KG/M2 | SYSTOLIC BLOOD PRESSURE: 149 MMHG | HEART RATE: 114 BPM | TEMPERATURE: 99.1 F | OXYGEN SATURATION: 98 % | DIASTOLIC BLOOD PRESSURE: 100 MMHG | HEIGHT: 67 IN | WEIGHT: 214 LBS

## 2020-08-17 PROCEDURE — 99214 OFFICE O/P EST MOD 30 MIN: CPT

## 2020-08-17 NOTE — ASSESSMENT
[FreeTextEntry1] : 1 hpn He is still not well controlled and needs to have valsartan increased to 80 mg  \par DIETARY SALT (SODIUM); DASH DIET AND BLOOD PRESSURE:\par To decrease the sodium in your diet: \par · Use fresh vegetables and foods as much as possible.\par · Avoid canned and processed foods. Cured meats such as bashir, ham, and sausages are high in salt.\par · Try using different herbs and spices in your cooking instead of salt.\par · In restaurants, avoid foods with sauces, cheese, and cured meats. Ask for low-sodium choices.\par To get more potassium in your diet, eat:\par · Bananas, fresh or dried apricots, peaches, citrus fruits, melons\par · Cauliflower, broccoli, tomatoes, carrots, raw spinach, beet greens, potatoes\par To get more magnesium in your diet, eat:\par · Whole grain foods, leafy green vegetables, dried fruits\par • Fish and seafood, poultry \par To get more calcium in your diet, eat:\par · Nonfat milk, yogurt, and low-fat cheeses \par · Casmalia and sardines\par · Cooked dried beans\par · Broccoli, kale, and bok ramana\par · Tofu or soybean curd\par DASH stands for "dietary approaches to stop hypertension." The DASH diet is low in total and saturated fat. It is rich in fruits, vegetables, and low-fat dairy foods. The diet allows you to get natural fiber, calcium, and magnesium from food. It prevents or lowers high blood pressure. It can also help lower cholesterol in your blood. \par Don't change how you eat all at once. It's much more likely that you'll succeed if you make only one or two small changes at a time. Wait until those changes are a habit, then make a couple more changes. Some good starting steps include: \par Add one serving of vegetables to your meals at lunch and dinner. This is an easy way to help you get more vegetables in your diet. \par Have a piece of fruit as an afternoon or after-dinner snack. One glass of juice at breakfast is not enough fruit in your diet. \par Use half your usual amount of butter, margarine, or salad dressing. \par Buy nonfat salad dressing or nonfat sour cream.\par Follow this guide to select your menu of meals. The number of calories we want you to eat each day will tell you how many servings you can choose from each food group.\par Calories: 1600 2100 2600 3100 Servings Grains 6 7 ½ 10 ½ 12 ½ Vegetables 	 4 4 ½ 5 6 Fruit 4 5 5 6 Dairy (low-fat) 2 ½ 3 3 3 ½ Meat, poultry, fish ½ 1 ½ 2 2 ½ Nuts, seeds ½ ½ ½ 1 Fats and sweets 1 ½ 2 ½ 3 4\par Grains and grain products like breads and cereals provide energy, fiber and vitamins. Whole grains have more of these nutrients. One serving equals one of the following:\par Bagel, 1/2 medium; Barley, cooked 1/2 cup; Biscuit, country style 1 medium; Bread, whole wheat, white 1 slice; Cereals, cold or cooked, 1/2 cup; Cornbread, 1 medium piece; Crackers, larry, 2; Crackers, saltine, 4; Dinner roll, 1medium; English muffin, ½; Hamburger bun, ½; Muffin, 1 medium; Pancake, 1 medium; Pasta, 1/2 cup cooked; Ashley, 1/2 large or 1 small; Popcorn, 1 cup popped; Pretzels, 1 ounce; Rice, white, brown, or wild, 1/2 cup cooked; Tortillas, corn or flour, 1 medium; Waffle, 1 medium; Wheat germ, 1/4 cup; \par Vegetables are rich sources of potassium, magnesium, and fiber. One serving is 1/2 cup of any of the following cooked vegetables:\par Asparagus, Beans (green, yellow), Beets, Broccoli, [place] Sprouts, Carrots, Cauliflower, Jo, chicory, mustard and turnip (and other) greens, Corn, Kale, Lima beans, Mixed vegetables, Okra, Parsnips, Peas, green, Potatoes (1/2 medium or 1/2 cup mashed), Pumpkin, Rutabaga, Spaghetti or tomato sauce, Spinach, Squash (zucchini or yellow), Stewed tomatoes, Succotash, Sweet potatoes, Turnips, Yam \par Raw vegetables: Carrots,1/2 cup; Celery, 1/2 cup; Lettuce (katie, loose-leaf, green-leaf), 1 cup; Peppers, 1/2 cup; Spinach, 1 cup; Tomato, 1/2\par Fruits and fruit juices are important sources of potassium and magnesium. Fruits also contain fiber and are low in sodium and fat. One serving equals:\par Any fruit juice, # cup (6 ounces); Canned or frozen fruit, ½ cup (includes applesauce); Dried fruit, ¼ cup; \par Fresh fruit:\par Apple, 1 medium; Apricots, 2 medium; Banana, 1 medium; Berries, 1/2 cup; Melon, 1 wedge, or 1/2 cup; Cherries, 10; Grapefruit, 1/2; Grapes, 15; Kiwi, 1 medium; Isai, 1/2 small; Nectarine, 1 medium; Orange, 1 medium; Peach, 1 medium; Pear, 1 medium; Pineapple, 1/2 cup; Plums, 2 medium; Tangerine, 1 large\par Dairy foods provide protein and calcium. Use low-fat or nonfat dairy products to cut down on fat. One serving equals:\par Skim milk, 1 cup (8 ounces); 1% low fat milk, 1 cup (8 ounces); 2% low fat milk, 1 cup (8 ounces) nonfat dry milk powder (1/3 cup); Low-fat cottage cheese, 1 cup (8 ounces); Parmesan cheese, 1 tablespoon; Mozzarella cheese, part skim, 1/4 cup (1 ounce); Low-fat cheddar cheese, 11/2 ounces; Ricotta cheese, part skim milk or nonfat, 1/4 cup (11/2 ounces); Other low fat or nonfat cheeses (11/2 oz.); Low-fat or nonfat yogurt, fruit-flavored or plain, 1 cup (8 ounces)\par Low-fat or nonfat frozen yogurt, 1/2 cup (4 ounces); Note: People who can't digest dairy products can try taking lactase enzyme pills or drops (available at drug and grocery stores) when they eat dairy. There is also milk available with the enzyme already added. Or you can buy lactose-free milk.\par Meat, poultry, and fish are good sources of protein and magnesium. One serving equals:\par Lean meat including beef, veal, or pork, 3 ounces cooked; Skinless, white meat poultry including turkey, chicken, 3 ounces; Fish and shellfish, 3 ounces cooked; Low-fat luncheon meats, 1 ounce; Egg, 1 medium; Tofu, 3 ounces\par Note: Three ounces of cooked meat is about the size of a deck of cards.\par Nuts, seeds, and legumes are rich sources of energy, magnesium, potassium, protein and fiber. Nuts and seeds are also high in fat, so portions should be small.\par Almonds, 1/3 cup; Beans such as kidney, ruiz, and navy, 1/2 cup cooked; Chickpeas and lentils, 1/2 cup cooked; Cashews, 1/3 cup; Filberts, 1/3 cup; Mixed nuts, 1/3 cup; Peanut butter, 2 tablespoons; Peanuts, 1/3 cup; Sesame seeds, 2 tablespoons; Sunflower seeds, 2 tablespoons; Tofu, regular, 3 ounces; Walnuts, 1/3 cup \par Following the above diet will give you about 27% fat in your diet. The goal is to have 30% or less of the calories you eat each day be from fat. To meet that goal, do not eat more than 2-3 servings daily of added fat. Also try to limit sweets. One serving equals:\par Butter or margarine, 1 teaspoon; Mayonnaise, 1 teaspoon; Low-fat mayonnaise, 1 tablespoon; Salad dressing, 1 tablespoon; Low-fat salad dressing, 2 tablespoons; Oil, 1 teaspoon (use olive, canola, safflower, or other vegetable oils); Candy, hard, 3 pieces; Jelly or jam, 1 tablespoon); Jell-O, 1/2 cup; Jelly beans, 1/2 ounce; Maple syrup, 1 tablespoon; Popsicle, 1; Sherbet or nonfat or low-fat frozen yogurt, 1/2 cup; Sugar, 1 tablespoon; Sugared lemonade or fruit punch, 1 cup (8 ounces); Note: Try diet fruit-flavored gelatin or frozen, canned, or fresh fruit for dessert.\par \par Small amounts of alcohol may have benefits to the heart and blood pressure. However, excess use of alcohol can cause damage to the brain, liver and other organs. It can lead to high blood pressure. Drinking more than two drinks (15 ml) every day can raise your blood pressure. 15 ml of alcohol equals: \par • one 12-ounce bottle of beer \par • a half glass (5 ounces) of wine \par • 1 ounce (one shot) of 100 proof hard liquor\par \par \par 2. ---The following has been discussed:---\par -Targets for weight and HgA1c have been discussed with patient \par -FS goals have been reviewed with the patient in detail:\par AM <130 post meal<160-180\par -Diet and weight goals have been discussed with the patient in detail.\par -The importance of exercise in the treatment of diabetes has been discussed \par with the patient in detail.\par -Extensive dietary advice provided to patient and the need to avoid concentrated \par sweets/simple carbohydrates and to ensure to consume protein with each meal. \par -Patient instructed to limit carbohydrates to 60 gms per meal and 15 gms per \par snacks. \par -Patient to keep a blood sugar log to check fasting and before meals\par -Patient instructed on importance of daily feet inspection and to reports any \par open lesions to physician promptly\par \par he will have increased bedtime insulin and premeals insulin .  \par 3. hypertriglyceridemia-  We discussed risk again and will have fasting  retaken Hyperlipedemia exercise weight loss low fat diet.  If you've been keeping an eye on your blood pressure and cholesterol levels, there's something else you might need to monitor: your triglycerides. Having a high level of triglycerides, a type of fat (lipid) in your blood, can increase your risk of heart disease.\par \par However, the same lifestyle choices that promote overall health can help lower your triglycerides, too.\par \par Triglycerides are a type of fat (lipid) found in your blood. When you eat, your body converts any calories it doesn't need to use right away into triglycerides. The triglycerides are stored in your fat cells. Later, hormones release triglycerides for energy between meals. If you regularly eat more calories than you burn, particularly "easy" calories like carbohydrates and fats, you may have high triglycerides (hypertriglyceridemia).\par \par A simple blood test can reveal whether your triglycerides fall into a healthy range.\par •Normal - Less than 150 milligrams per deciliter (mg/dL), or less than 1.7 millimoles per liter (mmol/L)\par •Borderline high - 150 to 199 mg/dL (1.8 to 2.2 mmol/L)\par •High - 200 to 499 mg/dL (2.3 to 5.6 mmol/L)\par •Very high - 500 mg/dL or above (5.7 mmol/L or above)\par \par Your doctor will usually check for high triglycerides as part of a cholesterol test (sometimes called a lipid panel or lipid profile). You'll have to fast for nine to 12 hours before blood can be drawn for an accurate triglyceride measurement.\par \par Triglycerides and cholesterol are separate types of lipids that circulate in your blood. Triglycerides store unused calories and provide your body with energy, and cholesterol is used to build cells and certain hormones. Because triglycerides and cholesterol can't dissolve in blood, they circulate throughout your body with the help of proteins that transport the lipids (lipoproteins).\par \par Although it's unclear how, high triglycerides may contribute to hardening of the arteries or thickening of the artery walls (atherosclerosis) - which increases the risk of stroke, heart attack and heart disease. Extremely high triglycerides - for example, levels above 1000 mg/dL (11.29 mmol/L) - can also cause acute pancreatitis.\par \par High triglycerides are often a sign of other conditions that increase the risk of heart disease and stroke as well, including obesity and metabolic syndrome - a cluster of conditions that includes too much fat around the waist, high blood pressure, high triglycerides, high blood sugar and abnormal cholesterol levels.\par \par Sometimes high triglycerides are a sign of poorly controlled type 2 diabetes, low levels of thyroid hormones (hypothyroidism), liver or kidney disease, or rare genetic conditions that affect how your body converts fat to energy. High triglycerides could also be a side effect of taking medications such as beta blockers, birth control pills, diuretics or steroids.\par \par Healthy lifestyle choices are key:\par •Lose weight. If you're overweight, losing 5 to 10 pounds can help lower your triglycerides. Motivate yourself by focusing on the benefits of losing weight, such as more energy and improved health.\par •Cut back on calories. Remember that extra calories are converted to triglycerides and stored as fat. Reducing your calories will reduce triglycerides.\par •Avoid sugary and refined foods. Simple carbohydrates, such as sugar and foods made with white flour, can increase triglycerides.\par •Choose healthier fats. Trade saturated fat found in meats for healthier monounsaturated fat found in plants, such as olive, peanut and canola oils. Substitute fish high in omega-3 fatty acids - such as mackerel and salmon - for red meat.\par •Limit how much alcohol you drink. Alcohol is high in calories and sugar and has a particularly potent effect on triglycerides. Even small amounts of alcohol can raise triglyceride levels.\par •Exercise regularly. Aim for at least 30 minutes of physical activity on most or all days of the week. Regular exercise can lower triglycerides and boost "good" cholesterol. Take a brisk daily walk, swim laps or join an exercise group. If you don't have time to exercise for 30 minutes, try squeezing it in 10 minutes at a time. Take a short walk, climb the stairs at work, or try some situps or pushups as you watch television.\par \par If healthy lifestyle changes aren't enough to control high triglycerides, your doctor might recommend some of the following:\par •Statins. Your doctor might prescribe these cholesterol-lowering drugs if you also have low high-density lipoprotein (HDL, or "good") cholesterol; high low-density lipoprotein (LDL, or "bad") cholesterol; or if you have a history of blocked arteries or diabetes. Examples include atorvastatin (Lipitor) and simvastatin (Zocor). Muscle pain is a potential side effect.\par •Fish oils. Also known as omega-3 fatty acids, fish oil supplements can help lower your triglycerides. High doses are needed, however, so this option is often reserved for people who have triglyceride levels over 500 mg/dL (5.7 mmol/L). \par •Fibrates. Fibrate medications, such as fenofibrate (TriCor, Fenoglide, others) and gemfibrozil (Lopid), also can lower your triglyceride levels. Fibrates seem to work best in people who have triglyceride levels over 500 mg/dL (5.7 mmol/L). Fibrates may increase the risk of side effects when taken together with statins.\par •Niacin. Niacin, sometimes called nicotinic acid, can lower your triglycerides and your "bad" cholesterol (low-density lipoprotein, or LDL, cholesterol). It's typically reserved for people who have triglyceride levels over 500 mg/dL (5.7 mmol/L). Don't take over-the-counter niacin without talking to your doctor first. Niacin can interact with other medications and can cause significant side effects.\par \par If your doctor prescribes medication to lower your triglycerides, take the medication as prescribed. And remember the significance of the healthy lifestyle changes you've made. Medications can help - but lifestyle matters, too\par \par I will add  crestor 5 mg to help with his chol and  triglycerides and will start at low dose first.  \par 3. Fatty Liver: The patient denies any jaundice or pruritus. The patient denies any alcohol use. The patient denies taking large doses of nonsteroidal anti-inflammatory drugs or acetaminophen. The findings are suggestive of fatty liver. The patient and I had a long discussion regarding the risks of fatty liver progressing to cirrhosis. The patient was told of the possible increased risk of developing liver failure, cirrhosis, ascites, GI bleeding secondary to varices, hepatic encephalopathy, bleeding tendencies and liver cancer. The patient was told of the importance of follow-up. The patient was advised to follow up every 6 months for blood work and imaging studies. The patient agreed and will follow up. The patient was advised to lose weight. I recommend a trial of vitamin E supplementation for the fatty liver. If the liver enzymes remain elevated, the patient may require a trial of Pioglitazone for the fatty liver. I recommend avoid alcohol and hepato-toxic agents. The patient was also advised to avoid NSAIDs, Acetaminophen and any other hepatotoxic drugs. The patient was also advised not to share needles, razors, scissors, nail clippers, etc.. The patient is to continue close follow-up in our office for blood work and exams. If the liver enzymes remain elevated, the patient may require a CT guided liver biopsy to assess the liver parenchyma and for possible treatment. We had a long discussion regarding the risks and benefits of the procedure. The patient was told of the risks of bleeding, perforation, infections, emergency surgery and missing lesions. The patient agreed and will follow-up to reassess the symptoms.\par \par His liver enzymes are elevated and he needs fibroscan  and weight loss \par 4 We discussed vit D and risks and understands the importance of taking the vitamin.  . Vitamin D is a nutrient found in some foods that is needed for health and to maintain strong bones. It does so by helping the body absorb calcium (one of bone’s main building blocks) from food and supplements. People who get too little vitamin D may develop soft, thin, and brittle bones, a condition known as rickets in children and osteomalacia in adults.\par \par Vitamin D is important to the body in many other ways as well. Muscles need it to move, for example, nerves need it to carry messages between the brain and every body part, and the immune system needs vitamin D to fight off invading bacteria and viruses. Together with calcium, vitamin D also helps protect older adults from osteoporosis. Vitamin D is found in cells throughout the body.\par \par How much vitamin D do I need?\par \par The amount of vitamin D you need each day depends on your age. Average daily recommended amounts from the Food and Nutrition Board (a national group of experts) for different ages are listed below in International Units (IU):\par \par \par Life Stage\par \par Recommended Amount\par \par \par Birth to 12 months 400 IU \par Children 1-13 years 600 IU \par Teens 14-18 years 600 IU \par Adults 19-70 years 600 IU \par Adults 71 years and older 800 IU \par Pregnant and breastfeeding women 600 IU \par   \par What foods provide vitamin D?\par \par Very few foods naturally have vitamin D. Fortified foods provide most of the vitamin D in [place] diets.\par •Fatty fish such as salmon, tuna, and mackerel are among the best sources.\par •Beef liver, cheese, and egg yolks provide small amounts.\par •Mushrooms provide some vitamin D. In some mushrooms that are newly available in stores, the vitamin D content is being boosted by exposing these mushrooms to ultraviolet light.\par •Almost all of the [place] milk supply is fortified with 400 IU of vitamin D per quart. But foods made from milk, like cheese and ice cream, are usually not fortified.\par •Vitamin D is added to many breakfast cereals and to some brands of orange juice, yogurt, margarine, and soy beverages; check the labels.\par \par Can I get vitamin D from the sun?\par \par The body makes vitamin D when skin is directly exposed to the sun, and most people meet at least some of their vitamin D needs this way. Skin exposed to sunshine indoors through a window will not produce vitamin D. Cloudy days, shade, and having dark-colored skin also cut down on the amount of vitamin D the skin makes.\par \par However, despite the importance of the sun to vitamin D synthesis, it is prudent to limit exposure of skin to sunlight in order to lower the risk for skin cancer. When out in the sun for more than a few minutes, wear protective clothing and apply sunscreen with an SPF (sun protection factor) of 8 or more. Tanning beds also cause the skin to make vitamin D, but pose similar risks for skin cancer.\par \par People who avoid the sun or who cover their bodies with sunscreen or clothing should include good sources of vitamin D in their diets or take a supplement. Recommended intakes of vitamin D are set on the assumption of little sun exposure.\par \par What kinds of vitamin D dietary supplements are available?\par \par Vitamin D is found in supplements (and fortified foods) in two different forms: D2 (ergocalciferol) and D3 (cholecalciferol). Both increase vitamin D in the blood.\par \par Am I getting enough vitamin D?\par \par Because vitamin D can come from sun, food, and supplements, the best measure of one’s vitamin D status is blood levels of a form known as 25-hydroxyvitamin D. Levels are described in either nanomoles per liter (nmol/L) or nanograms per milliliter (ng/mL), where 1 nmol/L = 0.4 ng/mL.\par \par In general, levels below 30 nmol/L (12 ng/mL) are too low for bone or overall health, and levels above 125 nmol/L (50 ng/mL) are probably too high. Levels of 50 nmol/L or above (20 ng/mL or above) are sufficient for most people.\par \par By these measures, some Americans are vitamin D deficient and almost no one has levels that are too high. In general, young people have higher blood levels of 25-hydroxyvitamin D than older people and males have higher levels than females. By race, non- blacks tend to have the lowest levels and non- whites the highest. The majority of Americans have blood levels lower than 75 nmol/L (30 ng/mL).\par \par Certain other groups may not get enough vitamin D:\par • infants, because human milk is a poor source of the nutrient.  infants should be given a supplement of 400 IU of vitamin D each day.\par •Older adults, because their skin doesn’t make vitamin D when exposed to sunlight as efficiently as when they were young, and their kidneys are less able to convert vitamin D to its active form.\par •People with dark skin, because their skin has less ability to produce vitamin D from the sun.\par •People with disorders such as Crohn’s disease or celiac disease who don’t handle fat properly, because vitamin D needs fat to be absorbed.\par •Obese people, because their body fat binds to some vitamin D and prevents it from getting into the blood.\par \par What happens if I don’t get enough vitamin D?\par \par People can become deficient in vitamin D because they don’t consume enough or absorb enough from food, their exposure to sunlight is limited, or their kidneys cannot convert vitamin D to its active form in the body. In children, vitamin D deficiency causes rickets, a condition in which the bones become soft and bend. It’s a rare disease but still occurs, especially among  infants and children. In adults, vitamin D deficiency leads to osteomalacia, causing bone pain and muscle weakness.\par \par What are some effects of vitamin D on health?\par \par Vitamin D is being studied for its possible connections to several diseases and medical problems, including diabetes, hypertension, and autoimmune conditions such as multiple sclerosis. Two of them discussed below are bone disorders and some types of cancer.\par \par Bone disorders\par \par As they get older, millions of people (mostly women, but men too) develop, or are at risk of, osteoporosis, condition in which bones become fragile and may fracture if one falls. It is one consequence of not getting enough calcium and vitamin D over the long term. Supplements of both vitamin D3 (at 700-800 IU/day) and calcium (500-1,200 mg/day) have been shown to reduce the risk of bone loss and fractures in elderly people aged 62-85 years. Men and women should talk with their healthcare providers about their needs for vitamin D (and calcium) as part of an overall plan to prevent or treat osteoporosis.\par \par Cancer\par \par Some studies suggest that vitamin D may protect against colon cancer and perhaps even cancers of the prostate and breast. But higher levels of vitamin D in the blood have also been linked to higher rates of pancreatic cancer. At this time, it’s too early to say whether low vitamin D status increases cancer risk and whether higher levels protect or even increase risk in some people.\par \par Can vitamin D be harmful?\par \par Yes, when amounts in the blood become too high. Signs of toxicity include nausea, vomiting, poor appetite, constipation, weakness, and weight loss. And by raising blood levels of calcium, too much vitamin D can cause confusion, disorientation, and problems with heart rhythm. Excess vitamin D can also damage the kidneys.\par \par The upper limit for vitamin D is 1,000 to 1,500 IU/day for infants, 2,500 to 3,000 IU/day for children 1-8 years, and 4,000 IU/day for children 9 years and older, adults, and pregnant and lactating teens and women. Vitamin D toxicity almost always occurs from overuse of supplements. Excessive sun exposure doesn’t cause vitamin D poisoning because the body limits the amount of this vitamin it produces.\par \par Are there any interactions with vitamin D that I should know about?\par \par Like most dietary supplements, vitamin D may interact or interfere with other medicines or supplements you might be taking. Here are several examples:\par •Prednisone and other corticosteroid medicines to reduce inflammation impair how the body handles vitamin D, which leads to lower calcium absorption He has not taken it and I discussed risks of vitd def and how it can also affect his triglycerides.  \par \par

## 2020-08-17 NOTE — PHYSICAL EXAM
[PERRL] : pupils equal round and reactive to light [Normal Oropharynx] : the oropharynx was normal [Normal Rate] : normal rate  [Normal S1, S2] : normal S1 and S2 [Regular Rhythm] : with a regular rhythm [No Edema] : there was no peripheral edema [No Extremity Clubbing/Cyanosis] : no extremity clubbing/cyanosis [Normal] : affect was normal and insight and judgment were intact [Comprehensive Foot Exam Normal] : Right and left foot were examined and both feet are normal. No ulcers in either foot. Toes are normal and with full ROM.  Normal tactile sensation with monofilament testing throughout both feet

## 2020-08-17 NOTE — HISTORY OF PRESENT ILLNESS
[de-identified] : pt has uncontrolled diabetes, and elevated triglycerides of 1284 . His chol is 248  he is feeling good and has not had vitd  and will start it now.  he has increased h is  insulin and and hi s glocuse is 190 -200 and will need increased  again.  He has not seen endocrinologist and  has tried .  he will have increased basglar at night and day time .  His chol is high as well and will add crestor  5 mg  to his medication if needed .   [FreeTextEntry1] : fu results

## 2020-08-23 ENCOUNTER — RX RENEWAL (OUTPATIENT)
Age: 40
End: 2020-08-23

## 2020-09-14 LAB
APPEARANCE: CLEAR
BACTERIA: NEGATIVE
BILIRUBIN URINE: NEGATIVE
BLOOD URINE: NEGATIVE
CHOLEST SERPL-MCNC: 225 MG/DL
CHOLEST/HDLC SERPL: 7.2 RATIO
COLOR: YELLOW
ESTIMATED AVERAGE GLUCOSE: 275 MG/DL
FRUCTOSAMINE SERPL-MCNC: 497 UMOL/L
GLUCOSE QUALITATIVE U: ABNORMAL
HBA1C MFR BLD HPLC: 11.2 %
HDLC SERPL-MCNC: 31 MG/DL
HYALINE CASTS: 2 /LPF
KETONES URINE: NEGATIVE
LDLC SERPL CALC-MCNC: NORMAL MG/DL
LEUKOCYTE ESTERASE URINE: NEGATIVE
MICROSCOPIC-UA: NORMAL
NITRITE URINE: NEGATIVE
PH URINE: 6
PROTEIN URINE: ABNORMAL
RED BLOOD CELLS URINE: 2 /HPF
SPECIFIC GRAVITY URINE: 1.03
SQUAMOUS EPITHELIAL CELLS: 2 /HPF
TRIGL SERPL-MCNC: 774 MG/DL
URINE COMMENTS: NORMAL
UROBILINOGEN URINE: NORMAL
WHITE BLOOD CELLS URINE: 5 /HPF

## 2020-09-15 LAB
ALBUMIN SERPL ELPH-MCNC: 5 G/DL
ALP BLD-CCNC: 83 U/L
ALT SERPL-CCNC: 38 U/L
ANION GAP SERPL CALC-SCNC: 18 MMOL/L
AST SERPL-CCNC: 34 U/L
BILIRUB SERPL-MCNC: 0.6 MG/DL
BUN SERPL-MCNC: 20 MG/DL
CALCIUM SERPL-MCNC: 10.1 MG/DL
CHLORIDE SERPL-SCNC: 96 MMOL/L
CO2 SERPL-SCNC: 22 MMOL/L
CREAT SERPL-MCNC: 0.85 MG/DL
GLUCOSE SERPL-MCNC: 232 MG/DL
POTASSIUM SERPL-SCNC: 4 MMOL/L
PROT SERPL-MCNC: 7.3 G/DL
SODIUM SERPL-SCNC: 136 MMOL/L

## 2020-09-17 ENCOUNTER — APPOINTMENT (OUTPATIENT)
Dept: INTERNAL MEDICINE | Facility: CLINIC | Age: 40
End: 2020-09-17
Payer: MEDICAID

## 2020-09-17 VITALS
TEMPERATURE: 98.1 F | SYSTOLIC BLOOD PRESSURE: 114 MMHG | BODY MASS INDEX: 32.73 KG/M2 | WEIGHT: 209 LBS | OXYGEN SATURATION: 98 % | DIASTOLIC BLOOD PRESSURE: 71 MMHG | HEART RATE: 85 BPM

## 2020-09-17 PROCEDURE — 99215 OFFICE O/P EST HI 40 MIN: CPT

## 2020-09-17 NOTE — HISTORY OF PRESENT ILLNESS
[FreeTextEntry1] : hypertriglyceridemia  diabetes uncontrolled  [de-identified] : pt is not well controlled with  triglycerides and his recent  level was 774 and chol 225 and hdl 31.  Pt has not seen endocrinologist and is not compliant to his diabetic diet.  he is taking his medication for cholesterol  He doesn’t have increased urination or thirst  .  He has lost 5lbs and is exercising.  He is taking his insulin.

## 2020-09-17 NOTE — COUNSELING
[Sleep ___ hours/day] : Sleep [unfilled] hours/day [Engage in a relaxing activity] : Engage in a relaxing activity [Plan in advance] : Plan in advance [Potential consequences of obesity discussed] : Potential consequences of obesity discussed [Benefits of weight loss discussed] : Benefits of weight loss discussed [Structured Weight Management Program suggested:] : Structured weight management program suggested [Encouraged to maintain food diary] : Encouraged to maintain food diary [Encouraged to increase physical activity] : Encouraged to increase physical activity [Encouraged to use exercise tracking device] : Encouraged to use exercise tracking device [Weigh Self Weekly] : weigh self weekly [Decrease Portions] : decrease portions [____ min/wk Activity] : [unfilled] min/wk activity [Keep Food Diary] : keep food diary

## 2020-09-17 NOTE — HEALTH RISK ASSESSMENT
[] : No [No] : No [No falls in past year] : Patient reported no falls in the past year [de-identified] : walks  [de-identified] : diabetic diet

## 2020-09-17 NOTE — ASSESSMENT
[FreeTextEntry1] : 1 hypertriglyceridemia -  1 lose weight .Whenever you eat more calories than you need your body turns those calories into triglycerides and stores them in fat cells.   I have added omega 3 to his medication  due to the amount of elevation he has .  I explained better control of his diabetes will help as well.  he could alos be on  atorvastatin or crestor which would increase his hdl and improve ldl and reduce triglycerides but it would  not lower triglycerides percentage wise  as much .    I considered more recent medication but with increased risk sof atriall fibrillation and he has not seen cardiologist I held back for now.  \par 2 limit your sugar intake\par 3 follow low carb diet\par 4 eat more fiber\par 5. exercise regularly\par 6. Avoid trans fats\par 7.Eat fatty  fish twice weekly \par 8 increase your intake of unsaturated fats.\par 9 Avoid alcohol \par 10 eat more omega 3 rich fish  salmon , sardines tuna, halibut\par 11 eat oatmeal, nuts vegetables , fruit , legumes and whole grains\par Hyperlipedemia exercise weight loss low fat diet.  If you've been keeping an eye on your blood pressure and cholesterol levels, there's something else you might need to monitor: your triglycerides. Having a high level of triglycerides, a type of fat (lipid) in your blood, can increase your risk of heart disease.\par \par However, the same lifestyle choices that promote overall health can help lower your triglycerides, too.\par \par Triglycerides are a type of fat (lipid) found in your blood. When you eat, your body converts any calories it doesn't need to use right away into triglycerides. The triglycerides are stored in your fat cells. Later, hormones release triglycerides for energy between meals. If you regularly eat more calories than you burn, particularly "easy" calories like carbohydrates and fats, you may have high triglycerides (hypertriglyceridemia).\par \par A simple blood test can reveal whether your triglycerides fall into a healthy range.\par •Normal - Less than 150 milligrams per deciliter (mg/dL), or less than 1.7 millimoles per liter (mmol/L)\par •Borderline high - 150 to 199 mg/dL (1.8 to 2.2 mmol/L)\par •High - 200 to 499 mg/dL (2.3 to 5.6 mmol/L)\par •Very high - 500 mg/dL or above (5.7 mmol/L or above)\par \par Your doctor will usually check for high triglycerides as part of a cholesterol test (sometimes called a lipid panel or lipid profile). You'll have to fast for nine to 12 hours before blood can be drawn for an accurate triglyceride measurement.\par \par Triglycerides and cholesterol are separate types of lipids that circulate in your blood. Triglycerides store unused calories and provide your body with energy, and cholesterol is used to build cells and certain hormones. Because triglycerides and cholesterol can't dissolve in blood, they circulate throughout your body with the help of proteins that transport the lipids (lipoproteins).\par \par Although it's unclear how, high triglycerides may contribute to hardening of the arteries or thickening of the artery walls (atherosclerosis) - which increases the risk of stroke, heart attack and heart disease. Extremely high triglycerides - for example, levels above 1000 mg/dL (11.29 mmol/L) - can also cause acute pancreatitis.\par \par High triglycerides are often a sign of other conditions that increase the risk of heart disease and stroke as well, including obesity and metabolic syndrome - a cluster of conditions that includes too much fat around the waist, high blood pressure, high triglycerides, high blood sugar and abnormal cholesterol levels.\par \par Sometimes high triglycerides are a sign of poorly controlled type 2 diabetes, low levels of thyroid hormones (hypothyroidism), liver or kidney disease, or rare genetic conditions that affect how your body converts fat to energy. High triglycerides could also be a side effect of taking medications such as beta blockers, birth control pills, diuretics or steroids.\par \par Healthy lifestyle choices are key:\par •Lose weight. If you're overweight, losing 5 to 10 pounds can help lower your triglycerides. Motivate yourself by focusing on the benefits of losing weight, such as more energy and improved health.\par •Cut back on calories. Remember that extra calories are converted to triglycerides and stored as fat. Reducing your calories will reduce triglycerides.\par •Avoid sugary and refined foods. Simple carbohydrates, such as sugar and foods made with white flour, can increase triglycerides.\par •Choose healthier fats. Trade saturated fat found in meats for healthier monounsaturated fat found in plants, such as olive, peanut and canola oils. Substitute fish high in omega-3 fatty acids - such as mackerel and salmon - for red meat.\par •Limit how much alcohol you drink. Alcohol is high in calories and sugar and has a particularly potent effect on triglycerides. Even small amounts of alcohol can raise triglyceride levels.\par •Exercise regularly. Aim for at least 30 minutes of physical activity on most or all days of the week. Regular exercise can lower triglycerides and boost "good" cholesterol. Take a brisk daily walk, swim laps or join an exercise group. If you don't have time to exercise for 30 minutes, try squeezing it in 10 minutes at a time. Take a short walk, climb the stairs at work, or try some situps or pushups as you watch television.\par \par If healthy lifestyle changes aren't enough to control high triglycerides, your doctor might recommend some of the following:\par •Statins. Your doctor might prescribe these cholesterol-lowering drugs if you also have low high-density lipoprotein (HDL, or "good") cholesterol; high low-density lipoprotein (LDL, or "bad") cholesterol; or if you have a history of blocked arteries or diabetes. Examples include atorvastatin (Lipitor) and simvastatin (Zocor). Muscle pain is a potential side effect.\par •Fish oils. Also known as omega-3 fatty acids, fish oil supplements can help lower your triglycerides. High doses are needed, however, so this option is often reserved for people who have triglyceride levels over 500 mg/dL (5.7 mmol/L). \par •Fibrates. Fibrate medications, such as fenofibrate (TriCor, Fenoglide, others) and gemfibrozil (Lopid), also can lower your triglyceride levels. Fibrates seem to work best in people who have triglyceride levels over 500 mg/dL (5.7 mmol/L). Fibrates may increase the risk of side effects when taken together with statins.\par •Niacin. Niacin, sometimes called nicotinic acid, can lower your triglycerides and your "bad" cholesterol (low-density lipoprotein, or LDL, cholesterol). It's typically reserved for people who have triglyceride levels over 500 mg/dL (5.7 mmol/L). Don't take over-the-counter niacin without talking to your doctor first. Niacin can interact with other medications and can cause significant side effects.\par \par If your doctor prescribes medication to lower your triglycerides, take the medication as prescribed. And remember the significance of the healthy lifestyle changes you've made. Medications can help - but lifestyle matters, too\par \par will refer to cardiologist for  evaluation of heart \par 2.  bmi 32  risks of obesity and need for diet and eating healthy Obesity is a complex disorder involving an excessive amount of body fat. Obesity isn't just a cosmetic concern. It increases your risk of diseases and health problems, such as heart disease, diabetes and high blood pressure.\par \par Being extremely obese means you are especially likely to have health problems related to your weight.\par \par \par The good news is that even modest weight loss can improve or prevent the health problems associated with obesity. Dietary changes, increased physical activity and behavior changes can help you lose weight. Prescription medications and weight-loss surgery are additional options for treating obesity.\par \par \par \par \par Symptoms\par \par Obesity is diagnosed when your body mass index (BMI) is 30 or higher. Your body mass index is calculated by dividing your weight in kilograms (kg) by your height in meters (m) squared. \par \par \par BMI\par \par Weight status\par \par \par Below 18.5 Underweight \par 18.5-24.9 Normal \par 25.0-29.9 Overweight \par 30.0-34.9 Obese (Class I) \par 35.0-39.9 Obese (Class II) \par 40.0 and higher Extreme obesity (Class III) \par \par For most people, BMI provides a reasonable estimate of body fat. However, BMI doesn't directly measure body fat, so some people, such as muscular athletes, may have a BMI in the obese category even though they don't have excess body fat. Ask your doctor if your BMI is a problem. \par \par When to see a doctor\par \par If you think you may be obese, and especially if you're concerned about weight-related health problems, see your doctor or health care provider. You and your provider can evaluate your health risks and discuss your weight-loss options. \par \par Request an Appointment at [facility]\par \par Causes\par \par Although there are genetic, behavioral and hormonal influences on body weight, obesity occurs when you take in more calories than you burn through exercise and normal daily activities. Your body stores these excess calories as fat.\par \par Obesity can sometimes be traced to a medical cause, such as Prader-Willi syndrome, Cushing's syndrome, and other diseases and conditions. However, these disorders are rare and, in general, the principal causes of obesity are:\par •Inactivity. If you're not very active, you don't burn as many calories. With a sedentary lifestyle, you can easily take in more calories every day than you use through exercise and normal daily activities.\par •Unhealthy diet and eating habits. Weight gain is inevitable if you regularly eat more calories than you burn. And most Americans' diets are too high in calories and are full of fast food and high-calorie beverages.\par \par Risk factors\par \par Obesity usually results from a combination of causes and contributing factors, including:\par •Genetics. Your genes may affect the amount of body fat you store, and where that fat is distributed. Genetics may also play a role in how efficiently your body converts food into energy and how your body burns calories during exercise.\par •Family lifestyle. Obesity tends to run in families. If one or both of your parents are obese, your risk of being obese is increased. That's not just because of genetics. Family members tend to share similar eating and activity habits.\par •Inactivity. If you're not very active, you don't burn as many calories. With a sedentary lifestyle, you can easily take in more calories every day than you burn through exercise and routine daily activities. Having medical problems, such as arthritis, can lead to decreased activity, which contributes to weight gain.\par •Unhealthy diet. A diet that's high in calories, lacking in fruits and vegetables, full of fast food, and laden with high-calorie beverages and oversized portions contributes to weight gain.\par •Medical problems. In some people, obesity can be traced to a medical cause, such as Prader-Willi syndrome, Cushing's syndrome and other conditions. Medical problems, such as arthritis, also can lead to decreased activity, which may result in weight gain.\par •Certain medications. Some medications can lead to weight gain if you don't compensate through diet or activity. These medications include some antidepressants, anti-seizure medications, diabetes medications, antipsychotic medications, steroids and beta blockers.\par •Social and economic issues. Research has linked social and economic factors to obesity. Avoiding obesity is difficult if you don't have safe areas to exercise. Similarly, you may not have been taught healthy ways of cooking, or you may not have money to buy healthier foods. In addition, the people you spend time with may influence your weight — you're more likely to become obese if you have obese friends or relatives.\par •Age. Obesity can occur at any age, even in young children. But as you age, hormonal changes and a less active lifestyle increase your risk of obesity. In addition, the amount of muscle in your body tends to decrease with age. This lower muscle mass leads to a decrease in metabolism. These changes also reduce calorie needs, and can make it harder to keep off excess weight. If you don't consciously control what you eat and become more physically active as you age, you'll likely gain weight.\par •Pregnancy. During pregnancy, a woman's weight necessarily increases. Some women find this weight difficult to lose after the baby is born. This weight gain may contribute to the development of obesity in women.\par •Quitting smoking. Quitting smoking is often associated with weight gain. And for some, it can lead to enough weight gain that the person becomes obese. In the long run, however, quitting smoking is still a greater benefit to your health than continuing to smoke.\par •Lack of sleep. Not getting enough sleep or getting too much sleep can cause changes in hormones that increase your appetite. You may also crave foods high in calories and carbohydrates, which can contribute to weight gain.\par \par Even if you have one or more of these risk factors, it doesn't mean that you're destined to become obese. You can counteract most risk factors through diet, physical activity and exercise, and behavior changes.\par \par Complications\par \par If you're obese, you're more likely to develop a number of potentially serious health problems, including:\par •High triglycerides and low high-density lipoprotein (HDL) cholesterol\par •Type 2 diabetes\par •High blood pressure\par •Metabolic syndrome — a combination of high blood sugar, high blood pressure, high triglycerides and low HDL cholesterol\par •Heart disease\par •Stroke\par •Cancer, including cancer of the uterus, cervix, endometrium, ovaries, breast, colon, rectum, esophagus, liver, gallbladder, pancreas, kidney and prostate\par •Breathing disorders, including sleep apnea, a potentially serious sleep disorder in which breathing repeatedly stops and starts\par •Gallbladder disease\par •Gynecological problems, such as infertility and irregular periods\par •Erectile dysfunction and sexual health issues\par •Nonalcoholic fatty liver disease, a condition in which fat builds up in the liver and can cause inflammation or scarring\par •Osteoarthritis\par \par Quality of life\par \par When you're obese, your overall quality of life may be diminished. You may not be able to do things you used to do, such as participating in enjoyable activities. You may avoid public places. Obese people may even encounter discrimination.\par \par Other weight-related issues that may affect your quality of life include:\par •Depression\par •Disability\par •Sexual problems\par •Shame and guilt\par •Social isolation\par •Lower work achievement\par \par Prevention\par \par Whether you're at risk of becoming obese, currently overweight or at a healthy weight, you can take steps to prevent unhealthy weight gain and related health problems. Not surprisingly, the steps to prevent weight gain are the same as the steps to lose weight: daily exercise, a healthy diet, and a long-term commitment to watch what you eat and drink.\par •Exercise regularly. You need to get 150 to 300 minutes of moderate-intensity activity a week to prevent weight gain. Moderately intense physical activities include fast walking and swimming.\par •Follow a healthy eating plan. Focus on low-calorie, nutrient-dense foods, such as fruits, veg\par 3. ---The following has been discussed:---\par -Targets for weight and HgA1c have been discussed with patient \par -FS goals have been reviewed with the patient in detail:\par AM <130 post meal<160-180\par -Diet and weight goals have been discussed with the patient in detail.\par -The importance of exercise in the treatment of diabetes has been discussed \par with the patient in detail.\par -Extensive dietary advice provided to patient and the need to avoid concentrated \par sweets/simple carbohydrates and to ensure to consume protein with each meal. \par -Patient instructed to limit carbohydrates to 60 gms per meal and 15 gms per \par snacks. \par -Patient to keep a blood sugar log to check fasting and before meals\par -Patient instructed on importance of daily feet inspection and to reports any \par open lesions to physician promptly\par \par he is not well controlled and is not following diet I again discussed risks of uncontrolled diabetes and vascular disease eye, renal, stroke heart   H ehas seen ophthalmologist , but has not seen endocrinologist or educator.  He is checking his blood sugars .  today it was 220 .  This was after breakfast.  I have increased his night time insulin to 20 units and also premeal to  10-15   DiabetesCounselingSectionEnd    AssessmentSectionStart Assessment\par Plan: \par 1. SMBG at least 2x/day - fasting and 2hr PP (Goals reviewed: Fastin-130mg/dL and 2hr PP <180mg/dL) \par 3. Healthier meals full of protein/fiber/ healthy fats - avoid refined CHO- meal plan given and explained-  Breakfast= 45g CHO; Lunch= 60g CHO; Dinner= 60g CHO; snacks 15g CHO  \par 4. Avoid sugar sweetened beverages \par 5. Time management and meal planning \par 6. Exercise at least 3-5x/wk for 30-60 minutes - affect of exercise and BG discussed \par 7. Call with highs/lows \par  \par 4 .  hpn well controlled continue present management.  \par 5 . Fatty Liver: The patient denies any jaundice or pruritus. The patient denies any alcohol use. The patient denies taking large doses of nonsteroidal anti-inflammatory drugs or acetaminophen. The findings are suggestive of fatty liver. The patient and I had a long discussion regarding the risks of fatty liver progressing to cirrhosis. The patient was told of the possible increased risk of developing liver failure, cirrhosis, ascites, GI bleeding secondary to varices, hepatic encephalopathy, bleeding tendencies and liver cancer. The patient was told of the importance of follow-up. The patient was advised to follow up every 6 months for blood work and imaging studies. The patient agreed and will follow up. The patient was advised to lose weight. I recommend a trial of vitamin E supplementation for the fatty liver. If the liver enzymes remain elevated, the patient may require a trial of Pioglitazone for the fatty liver. I recommend avoid alcohol and hepato-toxic agents. The patient was also advised to avoid NSAIDs, Acetaminophen and any other hepatotoxic drugs. The patient was also advised not to share needles, razors, scissors, nail clippers, etc.. The patient is to continue close follow-up in our office for blood work and exams. If the liver enzymes remain elevated, the patient may require a CT guided liver biopsy to assess the liver parenchyma and for possible treatment. We had a long discussion regarding the risks and benefits of the procedure. The patient was told of the risks of bleeding, perforation, infections, emergency surgery and missing lesions. The patient agreed and will follow-up to reassess the symptoms.\par \par

## 2020-09-17 NOTE — PHYSICAL EXAM
[Well Developed] : well developed [Well Nourished] : well nourished [Normal Sclera/Conjunctiva] : normal sclera/conjunctiva [PERRL] : pupils equal round and reactive to light [Normal Outer Ear/Nose] : the outer ears and nose were normal in appearance [Normal Oropharynx] : the oropharynx was normal [No JVD] : no jugular venous distention [Supple] : supple [Normal Rate] : normal rate  [Regular Rhythm] : with a regular rhythm [Normal S1, S2] : normal S1 and S2 [No Edema] : there was no peripheral edema [No Extremity Clubbing/Cyanosis] : no extremity clubbing/cyanosis [Normal Posterior Cervical Nodes] : no posterior cervical lymphadenopathy [Normal Anterior Cervical Nodes] : no anterior cervical lymphadenopathy [Coordination Grossly Intact] : coordination grossly intact [No Focal Deficits] : no focal deficits [Normal] : affect was normal and insight and judgment were intact [Comprehensive Foot Exam Normal] : Right and left foot were examined and both feet are normal. No ulcers in either foot. Toes are normal and with full ROM.  Normal tactile sensation with monofilament testing throughout both feet

## 2020-09-20 ENCOUNTER — RX RENEWAL (OUTPATIENT)
Age: 40
End: 2020-09-20

## 2020-09-20 LAB
APO A-I SERPL-MCNC: 116 MG/DL
APO A-I/APO B SERPL: 1.15 RATIO
APO B SERPL-MCNC: 134 MG/DL
APO LP(A) SERPL-MCNC: 27.1 NMOL/L

## 2020-09-28 RX ORDER — PEN NEEDLE, DIABETIC 29 G X1/2"
31G X 5 MM NEEDLE, DISPOSABLE MISCELLANEOUS
Qty: 3 | Refills: 3 | Status: ACTIVE | COMMUNITY
Start: 2020-02-25 | End: 1900-01-01

## 2020-10-09 ENCOUNTER — RX RENEWAL (OUTPATIENT)
Age: 40
End: 2020-10-09

## 2020-11-12 ENCOUNTER — RX RENEWAL (OUTPATIENT)
Age: 40
End: 2020-11-12

## 2020-11-30 ENCOUNTER — APPOINTMENT (OUTPATIENT)
Dept: INTERNAL MEDICINE | Facility: CLINIC | Age: 40
End: 2020-11-30
Payer: MEDICAID

## 2020-11-30 VITALS
HEART RATE: 90 BPM | SYSTOLIC BLOOD PRESSURE: 128 MMHG | DIASTOLIC BLOOD PRESSURE: 87 MMHG | BODY MASS INDEX: 33.9 KG/M2 | TEMPERATURE: 97.8 F | WEIGHT: 216 LBS | HEIGHT: 67 IN | OXYGEN SATURATION: 98 %

## 2020-11-30 DIAGNOSIS — Z86.69 PERSONAL HISTORY OF OTHER DISEASES OF THE NERVOUS SYSTEM AND SENSE ORGANS: ICD-10-CM

## 2020-11-30 DIAGNOSIS — Z87.19 PERSONAL HISTORY OF OTHER DISEASES OF THE DIGESTIVE SYSTEM: ICD-10-CM

## 2020-11-30 PROCEDURE — 99214 OFFICE O/P EST MOD 30 MIN: CPT

## 2020-11-30 NOTE — ASSESSMENT
[FreeTextEntry1] : dm - ---The following has been discussed:---\par -Targets for weight and HgA1c have been discussed with patient \par -FS goals have been reviewed with the patient in detail:\par AM <130 post meal<160-180\par -Diet and weight goals have been discussed with the patient in detail.\par -The importance of exercise in the treatment of diabetes has been discussed \par with the patient in detail.\par -Extensive dietary advice provided to patient and the need to avoid concentrated \par sweets/simple carbohydrates and to ensure to consume protein with each meal. \par -Patient instructed to limit carbohydrates to 60 gms per meal and 15 gms per \par snacks. \par -Patient to keep a blood sugar log to check fasting and before meals\par -Patient instructed on importance of daily feet inspection and to reports any \par open lesions to physician promptly\par \par He should see diabetic educator and  endocrinologist If he is still not well controlled I discussed adding metformin to his regime for control and adjusting his insulin again.  \par 2.  hypertriglyceridemia he is taking his medications but has gained weight and will add atorvastatin or  crestor if needed for better control as well  \par 3.  Fatty Liver: The patient denies any jaundice or pruritus. The patient denies any alcohol use. The patient denies taking large doses of nonsteroidal anti-inflammatory drugs or acetaminophen. The findings are suggestive of fatty liver. The patient and I had a long discussion regarding the risks of fatty liver progressing to cirrhosis. The patient was told of the possible increased risk of developing liver failure, cirrhosis, ascites, GI bleeding secondary to varices, hepatic encephalopathy, bleeding tendencies and liver cancer. The patient was told of the importance of follow-up. The patient was advised to follow up every 6 months for blood work and imaging studies. The patient agreed and will follow up. The patient was advised to lose weight. I recommend a trial of vitamin E supplementation for the fatty liver. If the liver enzymes remain elevated, the patient may require a trial of Pioglitazone for the fatty liver. I recommend avoid alcohol and hepato-toxic agents. The patient was also advised to avoid NSAIDs, Acetaminophen and any other hepatotoxic drugs. The patient was also advised not to share needles, razors, scissors, nail clippers, etc.. The patient is to continue close follow-up in our office for blood work and exams. If the liver enzymes remain elevated, the patient may require a CT guided liver biopsy to assess the liver parenchyma and for possible treatment. We had a long discussion regarding the risks and benefits of the procedure. The patient was told of the risks of bleeding, perforation, infections, emergency surgery and missing lesions. The patient agreed and will follow-up to reassess the symptoms.\par \par he has not gone for fibroscan or us of abd and will go now.  \par 4.  erectile disorder he needs to control his diabetes and also triglycerides  and will check his  testoosterone levels and if needed will discuss Cialis.  \par 5. sleep apnea is associated with adverse clinical consequences which an affect most organ systems. Cardiovascular disease risk includes arrhythmias, atrial fibrillation, hypertension, coronary artery disease, and stroke. Metabolic disorders include diabetes type 2, non-alcoholic fatty liver disease. Mood disorder especially depression; and cognitive decline especially in the elderly. Associations with chronic reflux/Roth’s esophagus some but not all inclusive. \par -Reasons include arousal consistent with hypopnea; respiratory events most prominent in REM sleep or supine position; therefore sleep staging and body position are important for accurate diagnosis and estimation of AHI. \par 6. hpn controlled continue present management.  \par

## 2020-11-30 NOTE — PHYSICAL EXAM
[Normal Sclera/Conjunctiva] : normal sclera/conjunctiva [PERRL] : pupils equal round and reactive to light [Normal Oropharynx] : the oropharynx was normal [Supple] : supple [Normal Rate] : normal rate  [Regular Rhythm] : with a regular rhythm [Normal S1, S2] : normal S1 and S2 [No Edema] : there was no peripheral edema [Normal Posterior Cervical Nodes] : no posterior cervical lymphadenopathy [Normal Anterior Cervical Nodes] : no anterior cervical lymphadenopathy [Normal] : affect was normal and insight and judgment were intact

## 2020-11-30 NOTE — HISTORY OF PRESENT ILLNESS
[FreeTextEntry1] : diabetes  [de-identified] : Pt has not fu due to his fathers recent death .  he has gained weight  7lbs.  he has been checking his  blood sugars and they are in 180s  .  he doesn’t have increased urination or thirst.  he doesn’t have blurred vision.  No  chest pain sob.  . he didn’t get his flu vaccine.   pt states last month he started to have  problems with erection and not being able to have a  strong erection during intercouarse and it is continuing to happen.

## 2020-11-30 NOTE — COUNSELING
[Weigh Self Weekly] : weigh self weekly [Decrease Portions] : decrease portions [____ min/wk Activity] : [unfilled] min/wk activity [Keep Food Diary] : keep food diary

## 2020-11-30 NOTE — HEALTH RISK ASSESSMENT
[] : No [No] : No [No falls in past year] : Patient reported no falls in the past year [de-identified] : walks  [de-identified] : diabetic diet low fat.

## 2020-12-01 LAB
ALBUMIN SERPL ELPH-MCNC: 4.8 G/DL
ALP BLD-CCNC: 90 U/L
ALT SERPL-CCNC: 35 U/L
ANION GAP SERPL CALC-SCNC: 14 MMOL/L
AST SERPL-CCNC: 27 U/L
BILIRUB SERPL-MCNC: 0.4 MG/DL
BUN SERPL-MCNC: 15 MG/DL
CALCIUM SERPL-MCNC: 10.1 MG/DL
CHLORIDE SERPL-SCNC: 99 MMOL/L
CHOLEST SERPL-MCNC: 292 MG/DL
CO2 SERPL-SCNC: 23 MMOL/L
CREAT SERPL-MCNC: 0.75 MG/DL
ESTIMATED AVERAGE GLUCOSE: 278 MG/DL
FRUCTOSAMINE SERPL-MCNC: 461 UMOL/L
GLUCOSE SERPL-MCNC: 293 MG/DL
HBA1C MFR BLD HPLC: 11.3 %
HDLC SERPL-MCNC: 26 MG/DL
LDLC SERPL CALC-MCNC: NORMAL MG/DL
NONHDLC SERPL-MCNC: 266 MG/DL
POTASSIUM SERPL-SCNC: 4.1 MMOL/L
PROT SERPL-MCNC: 7.6 G/DL
SODIUM SERPL-SCNC: 136 MMOL/L
TRIGL SERPL-MCNC: 1257 MG/DL

## 2020-12-06 LAB
TESTOST BND SERPL-MCNC: 15.2 PG/ML
TESTOST SERPL-MCNC: 494.7 NG/DL

## 2021-03-01 ENCOUNTER — APPOINTMENT (OUTPATIENT)
Dept: INTERNAL MEDICINE | Facility: CLINIC | Age: 41
End: 2021-03-01
Payer: MEDICAID

## 2021-03-01 ENCOUNTER — LABORATORY RESULT (OUTPATIENT)
Age: 41
End: 2021-03-01

## 2021-03-01 VITALS — SYSTOLIC BLOOD PRESSURE: 138 MMHG | DIASTOLIC BLOOD PRESSURE: 94 MMHG

## 2021-03-01 VITALS
DIASTOLIC BLOOD PRESSURE: 94 MMHG | HEIGHT: 67 IN | SYSTOLIC BLOOD PRESSURE: 140 MMHG | TEMPERATURE: 97.6 F | HEART RATE: 99 BPM | WEIGHT: 214 LBS | BODY MASS INDEX: 33.59 KG/M2 | OXYGEN SATURATION: 98 %

## 2021-03-01 DIAGNOSIS — M51.26 OTHER INTERVERTEBRAL DISC DISPLACEMENT, LUMBAR REGION: ICD-10-CM

## 2021-03-01 PROCEDURE — 99072 ADDL SUPL MATRL&STAF TM PHE: CPT

## 2021-03-01 PROCEDURE — 90686 IIV4 VACC NO PRSV 0.5 ML IM: CPT

## 2021-03-01 PROCEDURE — G0008: CPT

## 2021-03-01 PROCEDURE — 99215 OFFICE O/P EST HI 40 MIN: CPT | Mod: 25

## 2021-03-01 RX ORDER — INFLUENZA A VIRUS A/VICTORIA/4897/2022 IVR-238 (H1N1) ANTIGEN (FORMALDEHYDE INACTIVATED), INFLUENZA A VIRUS A/DARWIN/9/2021 SAN-010 (H3N2) ANTIGEN (FORMALDEHYDE INACTIVATED), INFLUENZA B VIRUS B/PHUKET/3073/2013 ANTIGEN (FORMALDEHYDE INACTIVATED), AND INFLUENZA B VIRUS B/MICHIGAN/01/2021 ANTIGEN (FORMALDEHYDE INACTIVATED) 15; 15; 15; 15 UG/.5ML; UG/.5ML; UG/.5ML; UG/.5ML
0.5 INJECTION, SUSPENSION INTRAMUSCULAR
Qty: 1 | Refills: 0 | Status: COMPLETED | COMMUNITY
Start: 2020-09-17 | End: 2021-03-01

## 2021-03-01 NOTE — PHYSICAL EXAM
[No Acute Distress] : no acute distress [Well Nourished] : well nourished [Well Developed] : well developed [Well-Appearing] : well-appearing [Normal Sclera/Conjunctiva] : normal sclera/conjunctiva [PERRL] : pupils equal round and reactive to light [EOMI] : extraocular movements intact [Normal Outer Ear/Nose] : the outer ears and nose were normal in appearance [Normal Oropharynx] : the oropharynx was normal [No JVD] : no jugular venous distention [No Lymphadenopathy] : no lymphadenopathy [Supple] : supple [Thyroid Normal, No Nodules] : the thyroid was normal and there were no nodules present [No Respiratory Distress] : no respiratory distress  [No Accessory Muscle Use] : no accessory muscle use [Clear to Auscultation] : lungs were clear to auscultation bilaterally [Normal Rate] : normal rate  [Regular Rhythm] : with a regular rhythm [Normal S1, S2] : normal S1 and S2 [No Murmur] : no murmur heard [No Carotid Bruits] : no carotid bruits [No Abdominal Bruit] : a ~M bruit was not heard ~T in the abdomen [No Varicosities] : no varicosities [Pedal Pulses Present] : the pedal pulses are present [No Edema] : there was no peripheral edema [No Palpable Aorta] : no palpable aorta [No Extremity Clubbing/Cyanosis] : no extremity clubbing/cyanosis [Soft] : abdomen soft [Non Tender] : non-tender [Non-distended] : non-distended [No HSM] : no HSM [Normal Bowel Sounds] : normal bowel sounds [Normal Posterior Cervical Nodes] : no posterior cervical lymphadenopathy [Normal Anterior Cervical Nodes] : no anterior cervical lymphadenopathy [No CVA Tenderness] : no CVA  tenderness [No Spinal Tenderness] : no spinal tenderness [Normal Kyphosis] : normal kyphosis [No Visual Abnormalities] : no visible abnormalities [Normal Lordosis] : normal lordosis [No Scoliosis] : no scoliosis [No Tenderness to Palpation] : no spine tenderness on palpation [No Masses] : no masses [Full ROM] : full ROM [No Pain with ROM] : no pain with motion in any direction [Intact] : all reflexes within normal limits bilaterally [No Joint Swelling] : no joint swelling [Grossly Normal Strength/Tone] : grossly normal strength/tone [No Rash] : no rash [Coordination Grossly Intact] : coordination grossly intact [No Focal Deficits] : no focal deficits [Normal Gait] : normal gait [Deep Tendon Reflexes (DTR)] : deep tendon reflexes were 2+ and symmetric [Normal Affect] : the affect was normal [Normal Insight/Judgement] : insight and judgment were intact

## 2021-03-01 NOTE — HISTORY OF PRESENT ILLNESS
[FreeTextEntry1] : diabetes  [de-identified] : Pt states he has not been taking any insulin since Nov .  he has not seen  endocrinologist and diabetic educator.  no increased thirst or urination.  He has not gone for fibroscan and sleep  specialist and pulmonary and cardiology  I discussed importance of fu and going to  the referrals.    pt has pain in his left  lower back  non radiating and level 8/10 and has it  it when he is sitting down and lying down.  He states its daily and didn’t take anything for it.   he states its less when he walks.   he didn’t fall ior have car accident but has hx of  spinal surgery for lumbar disc herniation.

## 2021-03-01 NOTE — END OF VISIT
[>50% of the face to face encounter time was spent on counseling and/or coordination of care for ___] : Greater than 50% of the face to face encounter time was spent on counseling and/or coordination of care for [unfilled] [FreeTextEntry3] : resident present  [Time Spent: ___ minutes] : I have spent [unfilled] minutes of time on the encounter.

## 2021-03-01 NOTE — CURRENT MEDS
Provider at bedside        Frank Barba RN  06/30/20 0452 [Other ___] : [unfilled] [No] : Did not review medication list for presence of high-risk medications. [Takes medication as prescribed] : does not take

## 2021-03-01 NOTE — PLAN
[FreeTextEntry1] :  Influenza Virus Vaccine (Inactivated) (in floo EN za VYE [place] vak SEEN, in ak ti NATALIA satya) \par \par COMMON USES:  It is used to prevent the flu. \par \par HOW TO USE THIS MEDICINE:  HOW IS THIS DRUG BEST TAKEN? Use this drug as ordered by your doctor. Read all information given to you. Follow all instructions closely. It is given as a shot into a muscle. HOW DO I STORE AND/OR THROW OUT THIS DRUG? If you need to store this drug at home, talk with your doctor, nurse, or pharmacist about how to store it. WHAT DO I DO IF I MISS A DOSE? Call your doctor to find out what to do. \par \par CAUTIONS:  Tell all of your health care providers that you take this drug. This includes your doctors, nurses, pharmacists, and dentists. If you have a latex allergy, talk with your doctor. If you are allergic to eggs, talk with the doctor. This drug may not protect all people who use it. Talk with the doctor. This drug is a vaccine with a virus that is not active. It cannot cause the disease. This drug is not a cure for the flu. It must be given before you are exposed to the flu in order to work. Most of the time, it takes a few weeks for this drug to work. This drug only protects you for 1 flu season. You will need to get the flu vaccine each year. Not all brands of vaccines are for all children. Talk with your child's doctor. Some children may need to have more than 1 dose of this vaccine. Talk with your child's doctor. Some children have had a fever and seizures caused by fevers with some flu vaccines. Most of the time, this happened in children younger than 5 years of age. Fever has also been seen in children 5 to younger than 9 years of age. Talk with your child's doctor. Tell your doctor if you are pregnant, plan on getting pregnant, or are breast-feeding. You will need to talk about the benefits and risks to you and the baby. \par \par POSSIBLE SIDE EFFECTS:  WHAT ARE SOME SIDE EFFECTS THAT I NEED TO CALL MY DOCTOR ABOUT RIGHT AWAY? WARNING/CAUTION: Even though it may be rare, some people may have very bad and sometimes deadly side effects when taking a drug. Tell your doctor or get medical help right away if you have any of the following signs or symptoms that may be related to a very bad side effect: Signs of an allergic reaction, like rash; hives; itching; red, swollen, blistered, or peeling skin with or without fever; wheezing; tightness in the chest or throat; trouble breathing, swallowing, or talking; unusual hoarseness; or swelling of the mouth, face, lips, tongue, or throat. A burning, numbness, or tingling feeling that is not normal. Not able to move face muscles as much. Trouble controlling body movements. Very bad dizziness or passing out. Muscle weakness. Seizures. Change in eyesight. WHAT ARE SOME OTHER SIDE EFFECTS OF THIS DRUG? All drugs may cause side effects. However, many people have no side effects or only have minor side effects. Call your doctor or get medical help if any of these side effects or any other side effects bother you or do not go away: For all patients taking this drug: Pain, redness, or swelling where the shot was given. Headache. Muscle or joint pain. Feeling tired or weak. Young children: Mild fever. Upset stomach or throwing up. Stomach pain or diarrhea. Not hungry. Feeling sleepy. Feeling fussy. Crying that is not normal. These are not all of the side effects that may occur. If you have questions about side effects, call your doctor. Call your doctor for medical advice about side effects. Report side effects to the FDA/CDC Vaccine Adverse Event Report System (VAERS) at https://vaers.hhs.gov/reportevent.html or by calling 1-648.446.8195. \par \par BEFORE USING THIS MEDICINE:  WHAT DO I NEED TO TELL MY DOCTOR BEFORE I TAKE THIS DRUG? TELL YOUR DOCTOR: If you are allergic to this drug; any part of this drug; or any other drugs, foods, or substances. Tell your doctor about the allergy and what signs you had. This drug may interact with other drugs or health problems. Tell your doctor and pharmacist about all of your drugs (prescription or OTC, natural products, vitamins) and health problems. You must check to make sure that it is safe for you to take this drug with all of your drugs and health problems. Do not start, stop, or change the dose of any drug without checking with your doctor. \par \par OVERDOSE:  If you think there has been an overdose, call your poison control center or get medical care right away. Be ready to tell or show what was taken, how much, and when it happened. \par \par ADDITIONAL INFORMATION:  If your symptoms or health problems do not get better or if they become worse, call your doctor. Do not share your drugs with others and do not take anyone else's drugs. Keep all drugs in a safe place. Keep all drugs out of the reach of children and pets. Throw away unused or  drugs. Do not flush down a toilet or pour down a drain unless you are told to do so. Check with your pharmacist if you have questions about the best way to throw out drugs. There may be drug take-back programs in your area. Some drugs may have another patient information leaflet. Check with your pharmacist. If you have any questions about this drug, please talk with your doctor, nurse, pharmacist, or other health care provider. \par \par Copyright  [email or web address] All Rights Reserved.     \par

## 2021-03-01 NOTE — ASSESSMENT
[FreeTextEntry1] :  1 diabetes - ---The following has been discussed:---\par -Targets for weight and HgA1c have been discussed with patient \par -FS goals have been reviewed with the patient in detail:\par AM <130 post meal<160-180\par -Diet and weight goals have been discussed with the patient in detail.\par -The importance of exercise in the treatment of diabetes has been discussed \par with the patient in detail.\par -Extensive dietary advice provided to patient and the need to avoid concentrated \par sweets/simple carbohydrates and to ensure to consume protein with each meal. \par -Patient instructed to limit carbohydrates to 60 gms per meal and 15 gms per \par snacks. \par -Patient to keep a blood sugar log to check fasting and before meals\par -Patient instructed on importance of daily feet inspection and to reports any \par open lesions to physician promptly\par \par 2.  HPN He had elevated bp today and  is taking his medication \par DIETARY SALT (SODIUM); DASH DIET AND BLOOD PRESSURE:\par To decrease the sodium in your diet: \par · Use fresh vegetables and foods as much as possible.\par · Avoid canned and processed foods. Cured meats such as bashir, ham, and sausages are high in salt.\par · Try using different herbs and spices in your cooking instead of salt.\par · In restaurants, avoid foods with sauces, cheese, and cured meats. Ask for low-sodium choices.\par To get more potassium in your diet, eat:\par · Bananas, fresh or dried apricots, peaches, citrus fruits, melons\par · Cauliflower, broccoli, tomatoes, carrots, raw spinach, beet greens, potatoes\par To get more magnesium in your diet, eat:\par · Whole grain foods, leafy green vegetables, dried fruits\par • Fish and seafood, poultry \par To get more calcium in your diet, eat:\par · Nonfat milk, yogurt, and low-fat cheeses \par · [place] and sardines\par · Cooked dried beans\par · Broccoli, kale, and bok ramana\par · Tofu or soybean curd\par DASH stands for "dietary approaches to stop hypertension." The DASH diet is low in total and saturated fat. It is rich in fruits, vegetables, and low-fat dairy foods. The diet allows you to get natural fiber, calcium, and magnesium from food. It prevents or lowers high blood pressure. It can also help lower cholesterol in your blood. \par Don't change how you eat all at once. It's much more likely that you'll succeed if you make only one or two small changes at a time. Wait until those changes are a habit, then make a couple more changes. Some good starting steps include: \par Add one serving of vegetables to your meals at lunch and dinner. This is an easy way to help you get more vegetables in your diet. \par Have a piece of fruit as an afternoon or after-dinner snack. One glass of juice at breakfast is not enough fruit in your diet. \par Use half your usual amount of butter, margarine, or salad dressing. \par Buy nonfat salad dressing or nonfat sour cream.\par Follow this guide to select your menu of meals. The number of calories we want you to eat each day will tell you how many servings you can choose from each food group.\par Calories: 1600 2100 2600 3100 Servings Grains 6 7 ½ 10 ½ 12 ½ Vegetables 	 4 4 ½ 5 6 Fruit 4 5 5 6 Dairy (low-fat) 2 ½ 3 3 3 ½ Meat, poultry, fish ½ 1 ½ 2 2 ½ Nuts, seeds ½ ½ ½ 1 Fats and sweets 1 ½ 2 ½ 3 4\par Grains and grain products like breads and cereals provide energy, fiber and vitamins. Whole grains have more of these nutrients. One serving equals one of the following:\par Bagel, 1/2 medium; Barley, cooked 1/2 cup; Biscuit, country style 1 medium; Bread, whole wheat, white 1 slice; Cereals, cold or cooked, 1/2 cup; Cornbread, 1 medium piece; Crackers, larry, 2; Crackers, saltine, 4; Dinner roll, 1medium; English muffin, ½; Hamburger bun, ½; Muffin, 1 medium; Pancake, 1 medium; Pasta, 1/2 cup cooked; [place], 1/2 large or 1 small; Popcorn, 1 cup popped; Pretzels, 1 ounce; Rice, white, brown, or wild, 1/2 cup cooked; Tortillas, corn or flour, 1 medium; Waffle, 1 medium; Wheat germ, 1/4 cup; \par Vegetables are rich sources of potassium, magnesium, and fiber. One serving is 1/2 cup of any of the following cooked vegetables:\par Asparagus, Beans (green, yellow), Beets, Broccoli, [place] Sprouts, Carrots, Cauliflower, Jo, chicory, mustard and turnip (and other) greens, Corn, Kale, Lima beans, Mixed vegetables, Okra, Parsnips, Peas, green, Potatoes (1/2 medium or 1/2 cup mashed), Pumpkin, Rutabaga, Spaghetti or tomato sauce, Spinach, Squash (zucchini or yellow), Stewed tomatoes, Succotash, Sweet potatoes, Turnips, Yam \par Raw vegetables: Carrots,1/2 cup; Celery, 1/2 cup; Lettuce (katie, loose-leaf, green-leaf), 1 cup; Peppers, 1/2 cup; Spinach, 1 cup; Tomato, 1/2\par Fruits and fruit juices are important sources of potassium and magnesium. Fruits also contain fiber and are low in sodium and fat. One serving equals:\par Any fruit juice, # cup (6 ounces); Canned or frozen fruit, ½ cup (includes applesauce); Dried fruit, ¼ cup; \par Fresh fruit:\par Apple, 1 medium; Apricots, 2 medium; Banana, 1 medium; Berries, 1/2 cup; Melon, 1 wedge, or 1/2 cup; Cherries, 10; Grapefruit, 1/2; Grapes, 15; Kiwi, 1 medium; Cloverleaf, 1/2 small; Nectarine, 1 medium; Orange, 1 medium; Peach, 1 medium; Pear, 1 medium; Pineapple, 1/2 cup; Plums, 2 medium; Tangerine, 1 large\par Dairy foods provide protein and calcium. Use low-fat or nonfat dairy products to cut down on fat. One serving equals:\par Skim milk, 1 cup (8 ounces); 1% low fat milk, 1 cup (8 ounces); 2% low fat milk, 1 cup (8 ounces) nonfat dry milk powder (1/3 cup); Low-fat cottage cheese, 1 cup (8 ounces); Parmesan cheese, 1 tablespoon; Mozzarella cheese, part skim, 1/4 cup (1 ounce); Low-fat cheddar cheese, 11/2 ounces; Ricotta cheese, part skim milk or nonfat, 1/4 cup (11/2 ounces); Other low fat or nonfat cheeses (11/2 oz.); Low-fat or nonfat yogurt, fruit-flavored or plain, 1 cup (8 ounces)\par Low-fat or nonfat frozen yogurt, 1/2 cup (4 ounces); Note: People who can't digest dairy products can try taking lactase enzyme pills or drops (available at drug and grocery stores) when they eat dairy. There is also milk available with the enzyme already added. Or you can buy lactose-free milk.\par Meat, poultry, and fish are good sources of protein and magnesium. One serving equals:\par Lean meat including beef, veal, or pork, 3 ounces cooked; Skinless, white meat poultry including turkey, chicken, 3 ounces; Fish and shellfish, 3 ounces cooked; Low-fat luncheon meats, 1 ounce; Egg, 1 medium; Tofu, 3 ounces\par Note: Three ounces of cooked meat is about the size of a deck of cards.\par Nuts, seeds, and legumes are rich sources of energy, magnesium, potassium, protein and fiber. Nuts and seeds are also high in fat, so portions should be small.\par Almonds, 1/3 cup; Beans such as kidney, ruiz, and navy, 1/2 cup cooked; Chickpeas and lentils, 1/2 cup cooked; Cashews, 1/3 cup; Filberts, 1/3 cup; Mixed nuts, 1/3 cup; Peanut butter, 2 tablespoons; Peanuts, 1/3 cup; Sesame seeds, 2 tablespoons; Sunflower seeds, 2 tablespoons; Tofu, regular, 3 ounces; Walnuts, 1/3 cup \par Following the above diet will give you about 27% fat in your diet. The goal is to have 30% or less of the calories you eat each day be from fat. To meet that goal, do not eat more than 2-3 servings daily of added fat. Also try to limit sweets. One serving equals:\par Butter or margarine, 1 teaspoon; Mayonnaise, 1 teaspoon; Low-fat mayonnaise, 1 tablespoon; Salad dressing, 1 tablespoon; Low-fat salad dressing, 2 tablespoons; Oil, 1 teaspoon (use olive, canola, safflower, or other vegetable oils); Candy, hard, 3 pieces; Jelly or jam, 1 tablespoon); Jell-O, 1/2 cup; Jelly beans, 1/2 ounce; Maple syrup, 1 tablespoon; Popsicle, 1; Sherbet or nonfat or low-fat frozen yogurt, 1/2 cup; Sugar, 1 tablespoon; Sugared lemonade or fruit punch, 1 cup (8 ounces); Note: Try diet fruit-flavored gelatin or frozen, canned, or fresh fruit for dessert.\par \par Small amounts of alcohol may have benefits to the heart and blood pressure. However, excess use of alcohol can cause damage to the brain, liver and other organs. It can lead to high blood pressure. Drinking more than two drinks (15 ml) every day can raise your blood pressure. 15 ml of alcohol equals: \par • one 12-ounce bottle of beer \par • a half glass (5 ounces) of wine \par • 1 ounce (one shot) of 100 proof hard liquor\par \par \par 3 Fatty Liver: The patient denies any jaundice or pruritus. The patient denies any alcohol use. The patient denies taking large doses of nonsteroidal anti-inflammatory drugs or acetaminophen. The findings are suggestive of fatty liver. The patient and I had a long discussion regarding the risks of fatty liver progressing to cirrhosis. The patient was told of the possible increased risk of developing liver failure, cirrhosis, ascites, GI bleeding secondary to varices, hepatic encephalopathy, bleeding tendencies and liver cancer. The patient was told of the importance of follow-up. The patient was advised to follow up every 6 months for blood work and imaging studies. The patient agreed and will follow up. The patient was advised to lose weight. I recommend a trial of vitamin E supplementation for the fatty liver. If the liver enzymes remain elevated, the patient may require a trial of Pioglitazone for the fatty liver. I recommend avoid alcohol and hepato-toxic agents. The patient was also advised to avoid NSAIDs, Acetaminophen and any other hepatotoxic drugs. The patient was also advised not to share needles, razors, scissors, nail clippers, etc.. The patient is to continue close follow-up in our office for blood work and exams. If the liver enzymes remain elevated, the patient may require a CT guided liver biopsy to assess the liver parenchyma and for possible treatment. We had a long discussion regarding the risks and benefits of the procedure. The patient was told of the risks of bleeding, perforation, infections, emergency surgery and missing lesions. The patient agreed and will follow-up to reassess the symptoms Patient has been counseled on life style interventions, including but not limited to: weight loss (3-5% loss of body weight might improve fat in the liver, while 7-10% needed for potential improvement of other components, including inflammation and scarring of the liver, called fibrosis); healthy diet, avoiding added sugars, sodas, avoiding saturated fats, limiting sodium, avoiding alcohol; and on the importance of regular exercise (> 150 min/week moderate intensity aerobic exercise with at least 2x/week muscle strengthening or exercise as tolerated). \par - I explained to patient the natural Hx of NAFLD. \par 4. sleep apnea is associated with adverse clinical consequences which an affect most organ systems. Cardiovascular disease risk includes arrhythmias, atrial fibrillation, hypertension, coronary artery disease, and stroke. Metabolic disorders include diabetes type 2, non-alcoholic fatty liver disease. Mood disorder especially depression; and cognitive decline especially in the elderly. Associations with chronic reflux/Roth’s esophagus some but not all inclusive. \par -Reasons include arousal consistent with hypopnea; respiratory events most prominent in REM sleep or supine position; therefore sleep staging and body position are important for accurate diagnosis and estimation of AHI. \par 5 back pain - Unless acute low back pain is caused by a serious medical condition (which is uncommon), it typically resolves fairly quickly, even if there is a bulging or herniated disc.\par Still, low back pain can make it hard to do your usual activities, and it can be frustrating to feel like you just have to wait for it to get better. Below are some simple things you can do that may help relieve your pain.\par Remaining active — Many people are afraid that they will hurt their back further or delay recovery by remaining active. However, remaining active, to the extent that you are able, is one of the best things you can do for your back.\par If you have severe pain, you may need to rest your back for a day or so. It may be most comfortable to lie on your back with a pillow under your knees and your head and shoulders elevated. For sleeping, you may want to lie on your side with your upper knee bent and a pillow between your knees. However, prolonged bed rest is not recommended. Studies have shown that people with low back pain recover faster when they remain active. Movement helps to relieve muscle spasms and prevents loss of muscle strength.\par While you should avoid strenuous activities and sports while you are in pain, it is fine to continue doing regular day-to-day activities and light exercises, such as walking. If certain activities cause your back to hurt too much, try something else instead.\par Heat — Using a heating pad or heated wrap can help with low back pain during the first few weeks. It is not clear if cold helps as well, but some people may find that it relieves pain temporarily.\par Modifications at work — Most experts recommend that people with low back pain continue to work so long as it is possible to avoid prolonged standing or sitting and heavy lifting. If your job does not allow you to sit or stand comfortably, you may need to take some time off work while you recover. While standing at work, stepping on a block of wood with one foot (and periodically alternating the foot on the block) may be helpful.\par Pain medications — You can try taking an over-the-counter medication to help relieve pain. Nonsteroidal antiinflammatory drugs (NSAIDs), such as ibuprofen (sample brand names: Advil, Motrin) and naproxen (brand name: Aleve), may work better than acetaminophen (brand name: Tylenol) for low back pain.\par If you do take pain medication, it may be more effective to take a dose on a regular basis for three to five days, rather than using the medication only when your pain becomes unbearable. Muscle relaxants (eg, cyclobenzaprine [brand name: Flexeril]) are prescription medications; while these may help relieve back pain, they can cause drowsiness and are probably no better than ibuprofen in relieving pain. Your health care provider can talk to you about whether muscle relaxants might help in your situation. They may be helpful before bedtime when used for a short time, ie, a week or two. People who need to be alert, such as while driving or operating machinery, should not use muscle relaxants.\par Opioids (drugs derived from morphine) are not recommended for most people with back pain. In rare situations, a health care provider might prescribe them for a few days if a person has severe pain that is not responding to other treatments, but they are generally not much more effective than NSAIDs. In addition, opioids have a relatively high risk of side effects and the potential to cause harm, including the risk of dependency and abuse.\par Exercise — Starting a new exercise program immediately after a new episode of low back pain will not speed recovery from the acute episode. However, there is evidence that exercise is beneficial in people with chronic back pain. Spinal manipulation — "Spinal manipulation" is a technique sometimes used by chiropractors, physical therapists, osteopaths, massage therapists, and others to relieve back pain. It involves moving the joints of the spine beyond the normal range of motion. Studies suggest that spinal manipulation may provide modest pain relief and improved function, and it generally appears to be safe if performed by an experienced professional. If you are interested in trying this approach, talk with your health care provider about how to integrate it into your treatment plan.\par Acupuncture — Acupuncture involves inserting very fine needles into specific points, as determined by traditional Chinese maps of the body's flow of energy. There is not consistent evidence that acupuncture is effective for people with acute low back pain; however, some people find it helpful.\par Massage — There is no evidence that massage is effective in treating acute low back pain. However, you may find that it is generally relaxing and helps you feel better temporarily.\par Psychological therapy — In some cases, mental health issues can contribute to low back pain. Psychological therapy has mostly been studied in the context of treating longer-term (chronic) back pain; however, it may be beneficial for some people with acute pain as well. Other treatments — You may have heard of other treatments that claim to relieve back pain. Unfortunately, most of these have not been proven to work or are only effective in specific situations. Examples include:\par ?Injections – Some clinicians recommend injections of a local anesthetic (numbing medication) into the soft tissues of the back, although it is not clear if these injections are effective. The areas targeted by these injections are called "trigger points." Trigger-point injections may be of benefit in some people with chronic back pain, but they are typically not recommended for treating acute pain.\par Injections of a glucocorticoid (steroid) medication are sometimes recommended for people with chronic low back pain with sciatica or radiculopathy The injection is given into the epidural space, which is located below the spinal cord. Epidural glucocorticoid injections do appear to improve pain slightly at two and six weeks after the injection, but not at 3, 6, or 12 months after the injection. There is no evidence that epidural steroid injections are helpful for people with back pain without sciatica.\par ?Corsets and braces – While wearable supportive garments may claim to help relieve or prevent low back pain, these are typically not effective.\par ?Traction – Traction involves the use of weights to realign or pull the spinal column into alignment. Clinical studies have shown no benefit from traction in the treatment of acute back pain.\par ?Switching to a firmer mattress – People often wonder if sleeping on a firmer mattress can help prevent or treat low back pain. Small studies have suggested that using a less firm mattress may actually be more likely to relieve pain; however, there is not enough evidence to support switching to a specific type of sleeping surface for this reason.\par ?Methods involving energy or electricity – Other interventions include ultrasound, interferential therapy, shortwave diathermy, transcutaneous electrical nerve stimulation, and low-level laser therapy, all of which involve applying energy to the skin's surface. None of these interventions have been proven to be effective, particularly during the first four to six weeks of an episode of back pain.\par CHRONIC LOW BACK PAIN TREATMENTWhile most people recover completely from an episode of acute low back pain, some people do go on to have longer-term pain. Chronic pain is typical\par 6 bmi 33  risks of obesity and need for diet and eating healthy Obesity is a complex disorder involving an excessive amount of body fat. Obesity isn't just a cosmetic concern. It increases your risk of diseases and health problems, such as heart disease, diabetes and high blood pressure.\par \par Being extremely obese means you are especially likely to have health problems related to your weight.\par \par \par The good news is that even modest weight loss can improve or prevent the health problems associated with obesity. Dietary changes, increased physical activity and behavior changes can help you lose weight. Prescription medications and weight-loss surgery are additional options for treating obesity.\par \par \par \par \par Symptoms\par \par Obesity is diagnosed when your body mass index (BMI) is 30 or higher. Your body mass index is calculated by dividing your weight in kilograms (kg) by your height in meters (m) squared. \par \par \par BMI\par \par Weight status\par \par \par Below 18.5 Underweight \par 18.5-24.9 Normal \par 25.0-29.9 Overweight \par 30.0-34.9 Obese (Class I) \par 35.0-39.9 Obese (Class II) \par 40.0 and higher Extreme obesity (Class III) \par \par For most people, BMI provides a reasonable estimate of body fat. However, BMI doesn't directly measure body fat, so some people, such as muscular athletes, may have a BMI in the obese category even though they don't have excess body fat. Ask your doctor if your BMI is a problem. \par \par When to see a doctor\par \par If you think you may be obese, and especially if you're concerned about weight-related health problems, see your doctor or health care provider. You and your provider can evaluate your health risks and discuss your weight-loss options. \par \par \par \par Causes\par \par Although there are genetic, behavioral and hormonal influences on body weight, obesity occurs when you take in more calories than you burn through exercise and normal daily activities. Your body stores these excess calories as fat.\par \par Obesity can sometimes be traced to a medical cause, such as Prader-Willi syndrome, Cushing's syndrome, and other diseases and conditions. However, these disorders are rare and, in general, the principal causes of obesity are:\par •Inactivity. If you're not very active, you don't burn as many calories. With a sedentary lifestyle, you can easily take in more calories every day than you use through exercise and normal daily activities.\par •Unhealthy diet and eating habits. Weight gain is inevitable if you regularly eat more calories than you burn. And most Americans' diets are too high in calories and are full of fast food and high-calorie beverages.\par \par Risk factors\par \par Obesity usually results from a combination of causes and contributing factors, including:\par •Genetics. Your genes may affect the amount of body fat you store, and where that fat is distributed. Genetics may also play a role in how efficiently your body converts food into energy and how your body burns calories during exercise.\par •Family lifestyle. Obesity tends to run in families. If one or both of your parents are obese, your risk of being obese is increased. That's not just because of genetics. Family members tend to share similar eating and activity habits.\par •Inactivity. If you're not very active, you don't burn as many calories. With a sedentary lifestyle, you can easily take in more calories every day than you burn through exercise and routine daily activities. Having medical problems, such as arthritis, can lead to decreased activity, which contributes to weight gain.\par •Unhealthy diet. A diet that's high in calories, lacking in fruits and vegetables, full of fast food, and laden with high-calorie beverages and oversized portions contributes to weight gain.\par •Medical problems. In some people, obesity can be traced to a medical cause, such as Prader-Willi syndrome, Cushing's syndrome and other conditions. Medical problems, such as arthritis, also can lead to decreased activity, which may result in weight gain.\par •Certain medications. Some medications can lead to weight gain if you don't compensate through diet or activity. These medications include some antidepressants, anti-seizure medications, diabetes medications, antipsychotic medications, steroids and beta blockers.\par •Social and economic issues. Research has linked social and economic factors to obesity. Avoiding obesity is difficult if you don't have safe areas to exercise. Similarly, you may not have been taught healthy ways of cooking, or you may not have money to buy healthier foods. In addition, the people you spend time with may influence your weight — you're more likely to become obese if you have obese friends or relatives.\par •Age. Obesity can occur at any age, even in young children. But as you age, hormonal changes and a less active lifestyle increase your risk of obesity. In addition, the amount of muscle in your body tends to decrease with age. This lower muscle mass leads to a decrease in metabolism. These changes also reduce calorie needs, and can make it harder to keep off excess weight. If you don't consciously control what you eat and become more physically active as you age, you'll likely gain weight.\par •Pregnancy. During pregnancy, a woman's weight necessarily increases. Some women find this weight difficult to lose after the baby is born. This weight gain may contribute to the development of obesity in women.\par •Quitting smoking. Quitting smoking is often associated with weight gain. And for some, it can lead to enough weight gain that the person becomes obese. In the long run, however, quitting smoking is still a greater benefit to your health than continuing to smoke.\par •Lack of sleep. Not getting enough sleep or getting too much sleep can cause changes in hormones that increase your appetite. You may also crave foods high in calories and carbohydrates, which can contribute to weight gain.\par \par Even if you have one or more of these risk factors, it doesn't mean that you're destined to become obese. You can counteract most risk factors through diet, physical activity and exercise, and behavior changes.\par \par Complications\par \par If you're obese, you're more likely to develop a number of potentially serious health problems, including:\par •High triglycerides and low high-density lipoprotein (HDL) cholesterol\par •Type 2 diabetes\par •High blood pressure\par •Metabolic syndrome — a combination of high blood sugar, high blood pressure, high triglycerides and low HDL cholesterol\par •Heart disease\par •Stroke\par •Cancer, including cancer of the uterus, cervix, endometrium, ovaries, breast, colon, rectum, esophagus, liver, gallbladder, pancreas, kidney and prostate\par •Breathing disorders, including sleep apnea, a potentially serious sleep disorder in which breathing repeatedly stops and starts\par •Gallbladder disease\par •Gynecological problems, such as infertility and irregular periods\par •Erectile dysfunction and sexual health issues\par •Nonalcoholic fatty liver disease, a condition in which fat builds up in the liver and can cause inflammation or scarring\par •Osteoarthritis\par \par Quality of life\par \par When you're obese, your overall quality of life may be diminished. You may not be able to do things you used to do, such as participating in enjoyable activities. You may avoid public places. Obese people may even encounter discrimination.\par \par Other weight-related issues that may affect your quality of life include:\par •Depression\par •Disability\par •Sexual problems\par •Shame and guilt\par •Social isolation\par •Lower work achievement\par \par Prevention\par \par Whether you're at risk of becoming obese, currently overweight or at a healthy weight, you can take steps to prevent unhealthy weight gain and related health problems. Not surprisingly, the steps to prevent weight gain are the same as the steps to lose weight: daily exercise, a healthy diet, and a long-term commitment to watch what you eat and drink.\par •Exercise regularly. You need to get 150 to 300 minutes of moderate-intensity activity a week to prevent weight gain. Moderately intense physical activities include fast walking and swimming.\par •Follow a healthy eating plan. Focus on low-calorie, nutrient-dense foods, such as fruits, veg\par \par 7 hyperlipidemia - weight loss exercise take medications control the blood sugar which can cause triglycerides to be elevated.  \par

## 2021-03-01 NOTE — HEALTH RISK ASSESSMENT
[No] : No [No falls in past year] : Patient reported no falls in the past year [] : No [de-identified] : walking  [de-identified] : diabetic diet.  but cheats .

## 2021-03-02 LAB
APPEARANCE: CLEAR
BACTERIA: NEGATIVE
BASOPHILS # BLD AUTO: 0.05 K/UL
BASOPHILS NFR BLD AUTO: 0.8 %
BILIRUBIN URINE: NEGATIVE
BLOOD URINE: NEGATIVE
COLOR: NORMAL
CREAT SPEC-SCNC: 129 MG/DL
EOSINOPHIL # BLD AUTO: 0.15 K/UL
EOSINOPHIL NFR BLD AUTO: 2.5 %
ERYTHROCYTE [SEDIMENTATION RATE] IN BLOOD BY WESTERGREN METHOD: 4 MM/HR
ESTIMATED AVERAGE GLUCOSE: 315 MG/DL
FRUCTOSAMINE SERPL-MCNC: 800 UMOL/L
GLUCOSE QUALITATIVE U: ABNORMAL
HBA1C MFR BLD HPLC: 12.6 %
HCT VFR BLD CALC: 51.9 %
HGB BLD-MCNC: 18.9 G/DL
HYALINE CASTS: 0 /LPF
IMM GRANULOCYTES NFR BLD AUTO: 1.3 %
KETONES URINE: ABNORMAL
LEUKOCYTE ESTERASE URINE: NEGATIVE
LYMPHOCYTES # BLD AUTO: 1.72 K/UL
LYMPHOCYTES NFR BLD AUTO: 28.2 %
MAN DIFF?: NORMAL
MCHC RBC-ENTMCNC: 36.3 PG
MCHC RBC-ENTMCNC: 36.4 GM/DL
MCV RBC AUTO: 99.6 FL
MICROALBUMIN 24H UR DL<=1MG/L-MCNC: 46.9 MG/DL
MICROALBUMIN/CREAT 24H UR-RTO: 364 MG/G
MICROSCOPIC-UA: NORMAL
MONOCYTES # BLD AUTO: 0.44 K/UL
MONOCYTES NFR BLD AUTO: 7.2 %
NEUTROPHILS # BLD AUTO: 3.67 K/UL
NEUTROPHILS NFR BLD AUTO: 60 %
NITRITE URINE: NEGATIVE
PH URINE: 6
PLATELET # BLD AUTO: 240 K/UL
PROTEIN URINE: ABNORMAL
RBC # BLD: 5.21 M/UL
RBC # FLD: 14.2 %
RED BLOOD CELLS URINE: 2 /HPF
SPECIFIC GRAVITY URINE: 1.04
SQUAMOUS EPITHELIAL CELLS: 0 /HPF
UROBILINOGEN URINE: NORMAL
WBC # FLD AUTO: 6.11 K/UL
WHITE BLOOD CELLS URINE: 0 /HPF

## 2021-03-03 ENCOUNTER — INPATIENT (INPATIENT)
Facility: HOSPITAL | Age: 41
LOS: 4 days | Discharge: HOME | DRG: 642 | End: 2021-03-08
Attending: STUDENT IN AN ORGANIZED HEALTH CARE EDUCATION/TRAINING PROGRAM | Admitting: STUDENT IN AN ORGANIZED HEALTH CARE EDUCATION/TRAINING PROGRAM
Payer: MEDICAID

## 2021-03-03 VITALS
HEART RATE: 115 BPM | SYSTOLIC BLOOD PRESSURE: 127 MMHG | OXYGEN SATURATION: 97 % | RESPIRATION RATE: 16 BRPM | DIASTOLIC BLOOD PRESSURE: 88 MMHG | TEMPERATURE: 99 F | HEIGHT: 67 IN | WEIGHT: 210.1 LBS

## 2021-03-03 DIAGNOSIS — Z98.89 OTHER SPECIFIED POSTPROCEDURAL STATES: Chronic | ICD-10-CM

## 2021-03-03 LAB
25(OH)D3 SERPL-MCNC: 6.6 NG/ML
APTT BLD: 37.2 SEC — HIGH (ref 27.5–35.5)
CHOLEST SERPL-MCNC: 721 MG/DL
CREAT SERPL-MCNC: 1.14 MG/DL — SIGNIFICANT CHANGE UP (ref 0.5–1.3)
CRP SERPL-MCNC: 3 MG/L
HDLC SERPL-MCNC: 10 MG/DL
INR BLD: 0.91 RATIO — SIGNIFICANT CHANGE UP (ref 0.88–1.16)
LDLC SERPL CALC-MCNC: NORMAL MG/DL
NONHDLC SERPL-MCNC: 712 MG/DL
PROTHROM AB SERPL-ACNC: 10.9 SEC — SIGNIFICANT CHANGE UP (ref 10.6–13.6)
TRIGL SERPL-MCNC: >8850 MG/DL

## 2021-03-03 PROCEDURE — 99285 EMERGENCY DEPT VISIT HI MDM: CPT

## 2021-03-03 RX ORDER — SODIUM CHLORIDE 9 MG/ML
3 INJECTION INTRAMUSCULAR; INTRAVENOUS; SUBCUTANEOUS EVERY 8 HOURS
Refills: 0 | Status: DISCONTINUED | OUTPATIENT
Start: 2021-03-03 | End: 2021-03-08

## 2021-03-03 NOTE — ED PROVIDER NOTE - CLINICAL SUMMARY MEDICAL DECISION MAKING FREE TEXT BOX
40M sent for admission for critically high triglycerides and cholesterol. Currently asymptomatic. Labs pending. Will admit as per PMD request. Pt stable, will reassess. 40M sent for admission for critically high triglycerides and cholesterol. Currently asymptomatic. Labs pending. Will admit as per PMD request. Pt stable, will reassess.    Pt noted to have mutliple electrolyte derangements. Na likely pseudohypoNa as triglycerides very high. K is hemolyzed. Mild transaminitis. Pt remains asymptomatic on reassessment. Pt stable, admitted and endorsed to inpatient team.

## 2021-03-03 NOTE — ED ADULT TRIAGE NOTE - CHIEF COMPLAINT QUOTE
pt states he was called by PMD and told to go to the hospital for abnormal labs, very high cholesterol level, in the thousands range, pt has no complaints

## 2021-03-03 NOTE — ED PROVIDER NOTE - CHPI ED SYMPTOMS NEG
no chest pain, no shortness of breath, no abd pain, no focal numbness/weakness/no fever/no nausea/no vomiting

## 2021-03-03 NOTE — ED PROVIDER NOTE - OBJECTIVE STATEMENT
39 y/o male with PMHx of DM, HTN, HLD, pancreatitis presents to the ED for admission as per PMD request. Pt notes he was called by his PMD Dr. Dumont and told to go to the ED for admission for critically high triglycerides or 700 and cholesterol of 8K. Pt states she is feeling well with no recent Sx. Pt denies chest pain, shortness of breath, nausea, vomiting, abd pain, focal numbness/weakness, fever, or any other complaints. No recent illness or hospitalizations. Pt allergic to Penicillin (rash).

## 2021-03-03 NOTE — ED ADULT NURSE NOTE - OBJECTIVE STATEMENT
Pt presents to ED for abnormal lab results and was instructed  to come to ED for further evaluation.

## 2021-03-04 ENCOUNTER — APPOINTMENT (OUTPATIENT)
Dept: RADIOLOGY | Facility: CLINIC | Age: 41
End: 2021-03-04

## 2021-03-04 ENCOUNTER — APPOINTMENT (OUTPATIENT)
Dept: ULTRASOUND IMAGING | Facility: CLINIC | Age: 41
End: 2021-03-04

## 2021-03-04 DIAGNOSIS — R74.01 ELEVATION OF LEVELS OF LIVER TRANSAMINASE LEVELS: ICD-10-CM

## 2021-03-04 DIAGNOSIS — Z29.9 ENCOUNTER FOR PROPHYLACTIC MEASURES, UNSPECIFIED: ICD-10-CM

## 2021-03-04 DIAGNOSIS — E87.5 HYPERKALEMIA: ICD-10-CM

## 2021-03-04 DIAGNOSIS — I10 ESSENTIAL (PRIMARY) HYPERTENSION: ICD-10-CM

## 2021-03-04 DIAGNOSIS — E87.1 HYPO-OSMOLALITY AND HYPONATREMIA: ICD-10-CM

## 2021-03-04 DIAGNOSIS — E11.9 TYPE 2 DIABETES MELLITUS WITHOUT COMPLICATIONS: ICD-10-CM

## 2021-03-04 DIAGNOSIS — E78.1 PURE HYPERGLYCERIDEMIA: ICD-10-CM

## 2021-03-04 LAB
ALBUMIN SERPL ELPH-MCNC: 3.5 G/DL — SIGNIFICANT CHANGE UP (ref 3.5–5)
ALBUMIN SERPL ELPH-MCNC: 3.5 G/DL — SIGNIFICANT CHANGE UP (ref 3.5–5)
ALP SERPL-CCNC: 100 U/L — SIGNIFICANT CHANGE UP (ref 40–120)
ALP SERPL-CCNC: 83 U/L — SIGNIFICANT CHANGE UP (ref 40–120)
ALT FLD-CCNC: 138 U/L DA — HIGH (ref 10–60)
ALT FLD-CCNC: 56 U/L DA — SIGNIFICANT CHANGE UP (ref 10–60)
ANA SER IF-ACNC: NEGATIVE
ANION GAP SERPL CALC-SCNC: 10 MMOL/L — SIGNIFICANT CHANGE UP (ref 5–17)
ANION GAP SERPL CALC-SCNC: 10 MMOL/L — SIGNIFICANT CHANGE UP (ref 5–17)
ANION GAP SERPL CALC-SCNC: 12 MMOL/L — SIGNIFICANT CHANGE UP (ref 5–17)
ANISOCYTOSIS BLD QL: SLIGHT — SIGNIFICANT CHANGE UP
AST SERPL-CCNC: 161 U/L — HIGH (ref 10–40)
AST SERPL-CCNC: 254 U/L — HIGH (ref 10–40)
BASOPHILS # BLD AUTO: 0 K/UL — SIGNIFICANT CHANGE UP (ref 0–0.2)
BASOPHILS # BLD AUTO: 0.06 K/UL — SIGNIFICANT CHANGE UP (ref 0–0.2)
BASOPHILS NFR BLD AUTO: 0 % — SIGNIFICANT CHANGE UP (ref 0–2)
BASOPHILS NFR BLD AUTO: 1 % — SIGNIFICANT CHANGE UP (ref 0–2)
BILIRUB SERPL-MCNC: 0.9 MG/DL — SIGNIFICANT CHANGE UP (ref 0.2–1.2)
BILIRUB SERPL-MCNC: 1.2 MG/DL — SIGNIFICANT CHANGE UP (ref 0.2–1.2)
BUN SERPL-MCNC: 13 MG/DL — SIGNIFICANT CHANGE UP (ref 7–18)
BUN SERPL-MCNC: 16 MG/DL — SIGNIFICANT CHANGE UP (ref 7–18)
BUN SERPL-MCNC: 18 MG/DL — SIGNIFICANT CHANGE UP (ref 7–18)
CALCIUM SERPL-MCNC: 6.8 MG/DL — LOW (ref 8.4–10.5)
CALCIUM SERPL-MCNC: 6.9 MG/DL — LOW (ref 8.4–10.5)
CALCIUM SERPL-MCNC: 7.8 MG/DL — LOW (ref 8.4–10.5)
CHLORIDE SERPL-SCNC: 95 MMOL/L — LOW (ref 96–108)
CHLORIDE SERPL-SCNC: 99 MMOL/L — SIGNIFICANT CHANGE UP (ref 96–108)
CHLORIDE SERPL-SCNC: 99 MMOL/L — SIGNIFICANT CHANGE UP (ref 96–108)
CHOLEST SERPL-MCNC: 350 MG/DL — HIGH
CHOLEST SERPL-MCNC: 362 MG/DL — HIGH
CHOLEST SERPL-MCNC: 397 MG/DL — HIGH
CK MB CFR SERPL CALC: <1 NG/ML — SIGNIFICANT CHANGE UP (ref 0–3.6)
CK SERPL-CCNC: 270 U/L — HIGH (ref 35–232)
CO2 SERPL-SCNC: 20 MMOL/L — LOW (ref 22–31)
CO2 SERPL-SCNC: 20 MMOL/L — LOW (ref 22–31)
CO2 SERPL-SCNC: 21 MMOL/L — LOW (ref 22–31)
CREAT SERPL-MCNC: 0.93 MG/DL — SIGNIFICANT CHANGE UP (ref 0.5–1.3)
CREAT SERPL-MCNC: 0.95 MG/DL — SIGNIFICANT CHANGE UP (ref 0.5–1.3)
EOSINOPHIL # BLD AUTO: 0.15 K/UL — SIGNIFICANT CHANGE UP (ref 0–0.5)
EOSINOPHIL # BLD AUTO: 0.15 K/UL — SIGNIFICANT CHANGE UP (ref 0–0.5)
EOSINOPHIL NFR BLD AUTO: 2 % — SIGNIFICANT CHANGE UP (ref 0–6)
EOSINOPHIL NFR BLD AUTO: 2.6 % — SIGNIFICANT CHANGE UP (ref 0–6)
ETHANOL SERPL-MCNC: <3 MG/DL — SIGNIFICANT CHANGE UP (ref 0–10)
FOLATE SERPL-MCNC: >20 NG/ML — SIGNIFICANT CHANGE UP
GLUCOSE BLDC GLUCOMTR-MCNC: 155 MG/DL — HIGH (ref 70–99)
GLUCOSE BLDC GLUCOMTR-MCNC: 161 MG/DL — HIGH (ref 70–99)
GLUCOSE BLDC GLUCOMTR-MCNC: 189 MG/DL — HIGH (ref 70–99)
GLUCOSE BLDC GLUCOMTR-MCNC: 282 MG/DL — HIGH (ref 70–99)
GLUCOSE BLDC GLUCOMTR-MCNC: 307 MG/DL — HIGH (ref 70–99)
GLUCOSE SERPL-MCNC: 222 MG/DL — HIGH (ref 70–99)
GLUCOSE SERPL-MCNC: 318 MG/DL — HIGH (ref 70–99)
GLUCOSE SERPL-MCNC: 382 MG/DL — HIGH (ref 70–99)
HCT VFR BLD CALC: 38.8 % — LOW (ref 39–50)
HCT VFR BLD CALC: 41.5 % — SIGNIFICANT CHANGE UP (ref 39–50)
HDLC SERPL-MCNC: 30 MG/DL — LOW
HDLC SERPL-MCNC: 32 MG/DL — LOW
HDLC SERPL-MCNC: 33 MG/DL — LOW
HGB BLD-MCNC: 13.7 G/DL — SIGNIFICANT CHANGE UP (ref 13–17)
HGB BLD-MCNC: 14 G/DL — SIGNIFICANT CHANGE UP (ref 13–17)
IMM GRANULOCYTES NFR BLD AUTO: 1.2 % — SIGNIFICANT CHANGE UP (ref 0–1.5)
LDH SERPL L TO P-CCNC: 549 U/L — HIGH (ref 120–225)
LIDOCAIN IGE QN: 149 U/L — SIGNIFICANT CHANGE UP (ref 73–393)
LIPID PNL WITH DIRECT LDL SERPL: SIGNIFICANT CHANGE UP MG/DL
LYMPHOCYTES # BLD AUTO: 1.99 K/UL — SIGNIFICANT CHANGE UP (ref 1–3.3)
LYMPHOCYTES # BLD AUTO: 2.31 K/UL — SIGNIFICANT CHANGE UP (ref 1–3.3)
LYMPHOCYTES # BLD AUTO: 26 % — SIGNIFICANT CHANGE UP (ref 13–44)
LYMPHOCYTES # BLD AUTO: 40 % — SIGNIFICANT CHANGE UP (ref 13–44)
MAGNESIUM SERPL-MCNC: 2.3 MG/DL — SIGNIFICANT CHANGE UP (ref 1.6–2.6)
MANUAL SMEAR VERIFICATION: SIGNIFICANT CHANGE UP
MCHC RBC-ENTMCNC: 30.5 PG — SIGNIFICANT CHANGE UP (ref 27–34)
MCHC RBC-ENTMCNC: 31.4 PG — SIGNIFICANT CHANGE UP (ref 27–34)
MCHC RBC-ENTMCNC: 33.7 GM/DL — SIGNIFICANT CHANGE UP (ref 32–36)
MCHC RBC-ENTMCNC: 35.3 GM/DL — SIGNIFICANT CHANGE UP (ref 32–36)
MCV RBC AUTO: 88.8 FL — SIGNIFICANT CHANGE UP (ref 80–100)
MCV RBC AUTO: 90.4 FL — SIGNIFICANT CHANGE UP (ref 80–100)
MONOCYTES # BLD AUTO: 0.48 K/UL — SIGNIFICANT CHANGE UP (ref 0–0.9)
MONOCYTES # BLD AUTO: 0.54 K/UL — SIGNIFICANT CHANGE UP (ref 0–0.9)
MONOCYTES NFR BLD AUTO: 7 % — SIGNIFICANT CHANGE UP (ref 2–14)
MONOCYTES NFR BLD AUTO: 8.3 % — SIGNIFICANT CHANGE UP (ref 2–14)
NEUTROPHILS # BLD AUTO: 2.71 K/UL — SIGNIFICANT CHANGE UP (ref 1.8–7.4)
NEUTROPHILS # BLD AUTO: 4.98 K/UL — SIGNIFICANT CHANGE UP (ref 1.8–7.4)
NEUTROPHILS NFR BLD AUTO: 46.9 % — SIGNIFICANT CHANGE UP (ref 43–77)
NEUTROPHILS NFR BLD AUTO: 65 % — SIGNIFICANT CHANGE UP (ref 43–77)
NON HDL CHOLESTEROL: 320 MG/DL — HIGH
NON HDL CHOLESTEROL: 330 MG/DL — HIGH
NON HDL CHOLESTEROL: 364 MG/DL — HIGH
NRBC # BLD: 0 /100 WBCS — SIGNIFICANT CHANGE UP (ref 0–0)
NRBC # BLD: 0 /100 — SIGNIFICANT CHANGE UP (ref 0–0)
PHOSPHATE SERPL-MCNC: 3.2 MG/DL — SIGNIFICANT CHANGE UP (ref 2.5–4.5)
PLAT MORPH BLD: NORMAL — SIGNIFICANT CHANGE UP
PLATELET # BLD AUTO: 254 K/UL — SIGNIFICANT CHANGE UP (ref 150–400)
PLATELET # BLD AUTO: 256 K/UL — SIGNIFICANT CHANGE UP (ref 150–400)
PLATELET COUNT - ESTIMATE: NORMAL — SIGNIFICANT CHANGE UP
POTASSIUM SERPL-MCNC: 4.2 MMOL/L — SIGNIFICANT CHANGE UP (ref 3.5–5.3)
POTASSIUM SERPL-MCNC: 6.2 MMOL/L — CRITICAL HIGH (ref 3.5–5.3)
POTASSIUM SERPL-MCNC: 6.2 MMOL/L — CRITICAL HIGH (ref 3.5–5.3)
POTASSIUM SERPL-SCNC: 4.2 MMOL/L — SIGNIFICANT CHANGE UP (ref 3.5–5.3)
POTASSIUM SERPL-SCNC: 6.2 MMOL/L — CRITICAL HIGH (ref 3.5–5.3)
POTASSIUM SERPL-SCNC: 6.2 MMOL/L — CRITICAL HIGH (ref 3.5–5.3)
PROT SERPL-MCNC: 7.4 G/DL — SIGNIFICANT CHANGE UP (ref 6–8.3)
PROT SERPL-MCNC: 8.2 G/DL — SIGNIFICANT CHANGE UP (ref 6–8.3)
RBC # BLD: 4.37 M/UL — SIGNIFICANT CHANGE UP (ref 4.2–5.8)
RBC # BLD: 4.59 M/UL — SIGNIFICANT CHANGE UP (ref 4.2–5.8)
RBC # FLD: 13 % — SIGNIFICANT CHANGE UP (ref 10.3–14.5)
RBC # FLD: 13.9 % — SIGNIFICANT CHANGE UP (ref 10.3–14.5)
RBC BLD AUTO: ABNORMAL
SARS-COV-2 IGG SERPL QL IA: NEGATIVE — SIGNIFICANT CHANGE UP
SARS-COV-2 IGM SERPL IA-ACNC: 0.06 INDEX — SIGNIFICANT CHANGE UP
SARS-COV-2 RNA SPEC QL NAA+PROBE: SIGNIFICANT CHANGE UP
SODIUM SERPL-SCNC: 125 MMOL/L — LOW (ref 135–145)
SODIUM SERPL-SCNC: 129 MMOL/L — LOW (ref 135–145)
SODIUM SERPL-SCNC: 132 MMOL/L — LOW (ref 135–145)
TRIGL SERPL-MCNC: 3467 MG/DL — HIGH
TRIGL SERPL-MCNC: 4748 MG/DL — SIGNIFICANT CHANGE UP
TRIGL SERPL-MCNC: >4000 MG/DL — HIGH
TROPONIN I SERPL-MCNC: <0.015 NG/ML — SIGNIFICANT CHANGE UP (ref 0–0.04)
TSH SERPL-MCNC: 3.73 UU/ML — SIGNIFICANT CHANGE UP (ref 0.34–4.82)
VIT B12 SERPL-MCNC: 421 PG/ML — SIGNIFICANT CHANGE UP (ref 232–1245)
WBC # BLD: 5.46 K/UL — SIGNIFICANT CHANGE UP (ref 3.8–10.5)
WBC # BLD: 7.66 K/UL — SIGNIFICANT CHANGE UP (ref 3.8–10.5)
WBC # FLD AUTO: 5.46 K/UL — SIGNIFICANT CHANGE UP (ref 3.8–10.5)
WBC # FLD AUTO: 7.66 K/UL — SIGNIFICANT CHANGE UP (ref 3.8–10.5)

## 2021-03-04 PROCEDURE — 93010 ELECTROCARDIOGRAM REPORT: CPT

## 2021-03-04 PROCEDURE — 99222 1ST HOSP IP/OBS MODERATE 55: CPT

## 2021-03-04 RX ORDER — INSULIN LISPRO 100/ML
12 VIAL (ML) SUBCUTANEOUS
Refills: 0 | Status: DISCONTINUED | OUTPATIENT
Start: 2021-03-04 | End: 2021-03-04

## 2021-03-04 RX ORDER — ATORVASTATIN CALCIUM 80 MG/1
80 TABLET, FILM COATED ORAL AT BEDTIME
Refills: 0 | Status: DISCONTINUED | OUTPATIENT
Start: 2021-03-04 | End: 2021-03-08

## 2021-03-04 RX ORDER — INSULIN HUMAN 100 [IU]/ML
10 INJECTION, SOLUTION SUBCUTANEOUS ONCE
Refills: 0 | Status: DISCONTINUED | OUTPATIENT
Start: 2021-03-04 | End: 2021-03-04

## 2021-03-04 RX ORDER — ALBUTEROL 90 UG/1
2 AEROSOL, METERED ORAL ONCE
Refills: 0 | Status: COMPLETED | OUTPATIENT
Start: 2021-03-04 | End: 2021-03-04

## 2021-03-04 RX ORDER — INSULIN GLARGINE 100 [IU]/ML
20 INJECTION, SOLUTION SUBCUTANEOUS AT BEDTIME
Refills: 0 | Status: DISCONTINUED | OUTPATIENT
Start: 2021-03-04 | End: 2021-03-05

## 2021-03-04 RX ORDER — INSULIN LISPRO 100/ML
VIAL (ML) SUBCUTANEOUS
Refills: 0 | Status: DISCONTINUED | OUTPATIENT
Start: 2021-03-04 | End: 2021-03-04

## 2021-03-04 RX ORDER — HYDROCHLOROTHIAZIDE 25 MG
25 TABLET ORAL DAILY
Refills: 0 | Status: DISCONTINUED | OUTPATIENT
Start: 2021-03-04 | End: 2021-03-05

## 2021-03-04 RX ORDER — ASPIRIN/CALCIUM CARB/MAGNESIUM 324 MG
81 TABLET ORAL DAILY
Refills: 0 | Status: DISCONTINUED | OUTPATIENT
Start: 2021-03-04 | End: 2021-03-08

## 2021-03-04 RX ORDER — CALCIUM GLUCONATE 100 MG/ML
1 VIAL (ML) INTRAVENOUS ONCE
Refills: 0 | Status: COMPLETED | OUTPATIENT
Start: 2021-03-04 | End: 2021-03-04

## 2021-03-04 RX ORDER — SODIUM CHLORIDE 9 MG/ML
1000 INJECTION INTRAMUSCULAR; INTRAVENOUS; SUBCUTANEOUS
Refills: 0 | Status: DISCONTINUED | OUTPATIENT
Start: 2021-03-04 | End: 2021-03-05

## 2021-03-04 RX ORDER — OMEGA-3 ACID ETHYL ESTERS 1 G
2 CAPSULE ORAL
Refills: 0 | Status: DISCONTINUED | OUTPATIENT
Start: 2021-03-04 | End: 2021-03-08

## 2021-03-04 RX ORDER — DEXTROSE 50 % IN WATER 50 %
50 SYRINGE (ML) INTRAVENOUS ONCE
Refills: 0 | Status: DISCONTINUED | OUTPATIENT
Start: 2021-03-04 | End: 2021-03-04

## 2021-03-04 RX ORDER — INSULIN HUMAN 100 [IU]/ML
10 INJECTION, SOLUTION SUBCUTANEOUS ONCE
Refills: 0 | Status: COMPLETED | OUTPATIENT
Start: 2021-03-04 | End: 2021-03-04

## 2021-03-04 RX ORDER — SODIUM ZIRCONIUM CYCLOSILICATE 10 G/10G
10 POWDER, FOR SUSPENSION ORAL ONCE
Refills: 0 | Status: COMPLETED | OUTPATIENT
Start: 2021-03-04 | End: 2021-03-04

## 2021-03-04 RX ORDER — PANTOPRAZOLE SODIUM 20 MG/1
40 TABLET, DELAYED RELEASE ORAL
Refills: 0 | Status: DISCONTINUED | OUTPATIENT
Start: 2021-03-04 | End: 2021-03-05

## 2021-03-04 RX ORDER — DEXTROSE 50 % IN WATER 50 %
50 SYRINGE (ML) INTRAVENOUS ONCE
Refills: 0 | Status: COMPLETED | OUTPATIENT
Start: 2021-03-04 | End: 2021-03-04

## 2021-03-04 RX ORDER — INSULIN LISPRO 100/ML
VIAL (ML) SUBCUTANEOUS EVERY 4 HOURS
Refills: 0 | Status: DISCONTINUED | OUTPATIENT
Start: 2021-03-04 | End: 2021-03-05

## 2021-03-04 RX ORDER — ENOXAPARIN SODIUM 100 MG/ML
40 INJECTION SUBCUTANEOUS DAILY
Refills: 0 | Status: DISCONTINUED | OUTPATIENT
Start: 2021-03-04 | End: 2021-03-08

## 2021-03-04 RX ORDER — GEMFIBROZIL 600 MG
600 TABLET ORAL
Refills: 0 | Status: DISCONTINUED | OUTPATIENT
Start: 2021-03-04 | End: 2021-03-08

## 2021-03-04 RX ORDER — SODIUM ZIRCONIUM CYCLOSILICATE 10 G/10G
5 POWDER, FOR SUSPENSION ORAL ONCE
Refills: 0 | Status: COMPLETED | OUTPATIENT
Start: 2021-03-04 | End: 2021-03-04

## 2021-03-04 RX ADMIN — Medication 3: at 08:19

## 2021-03-04 RX ADMIN — SODIUM ZIRCONIUM CYCLOSILICATE 10 GRAM(S): 10 POWDER, FOR SUSPENSION ORAL at 09:57

## 2021-03-04 RX ADMIN — Medication 81 MILLIGRAM(S): at 11:23

## 2021-03-04 RX ADMIN — INSULIN HUMAN 10 UNIT(S): 100 INJECTION, SOLUTION SUBCUTANEOUS at 09:44

## 2021-03-04 RX ADMIN — Medication 100 GRAM(S): at 17:59

## 2021-03-04 RX ADMIN — SODIUM CHLORIDE 3 MILLILITER(S): 9 INJECTION INTRAMUSCULAR; INTRAVENOUS; SUBCUTANEOUS at 12:51

## 2021-03-04 RX ADMIN — ALBUTEROL 2 PUFF(S): 90 AEROSOL, METERED ORAL at 09:44

## 2021-03-04 RX ADMIN — SODIUM CHLORIDE 125 MILLILITER(S): 9 INJECTION INTRAMUSCULAR; INTRAVENOUS; SUBCUTANEOUS at 12:51

## 2021-03-04 RX ADMIN — Medication 25 MILLIGRAM(S): at 07:08

## 2021-03-04 RX ADMIN — PANTOPRAZOLE SODIUM 40 MILLIGRAM(S): 20 TABLET, DELAYED RELEASE ORAL at 07:08

## 2021-03-04 RX ADMIN — INSULIN GLARGINE 20 UNIT(S): 100 INJECTION, SOLUTION SUBCUTANEOUS at 23:50

## 2021-03-04 RX ADMIN — Medication 2: at 23:50

## 2021-03-04 RX ADMIN — Medication 50 MILLILITER(S): at 09:43

## 2021-03-04 RX ADMIN — SODIUM CHLORIDE 3 MILLILITER(S): 9 INJECTION INTRAMUSCULAR; INTRAVENOUS; SUBCUTANEOUS at 23:51

## 2021-03-04 RX ADMIN — SODIUM CHLORIDE 125 MILLILITER(S): 9 INJECTION INTRAMUSCULAR; INTRAVENOUS; SUBCUTANEOUS at 20:20

## 2021-03-04 RX ADMIN — ENOXAPARIN SODIUM 40 MILLIGRAM(S): 100 INJECTION SUBCUTANEOUS at 11:23

## 2021-03-04 RX ADMIN — Medication 2 GRAM(S): at 10:38

## 2021-03-04 RX ADMIN — Medication 2 GRAM(S): at 17:59

## 2021-03-04 RX ADMIN — SODIUM ZIRCONIUM CYCLOSILICATE 5 GRAM(S): 10 POWDER, FOR SUSPENSION ORAL at 09:45

## 2021-03-04 RX ADMIN — Medication 600 MILLIGRAM(S): at 07:08

## 2021-03-04 RX ADMIN — ATORVASTATIN CALCIUM 80 MILLIGRAM(S): 80 TABLET, FILM COATED ORAL at 23:49

## 2021-03-04 RX ADMIN — Medication 6: at 11:24

## 2021-03-04 RX ADMIN — Medication 600 MILLIGRAM(S): at 17:59

## 2021-03-04 RX ADMIN — Medication 2: at 17:59

## 2021-03-04 RX ADMIN — SODIUM CHLORIDE 3 MILLILITER(S): 9 INJECTION INTRAMUSCULAR; INTRAVENOUS; SUBCUTANEOUS at 06:51

## 2021-03-04 RX ADMIN — Medication 12 UNIT(S): at 08:19

## 2021-03-04 NOTE — PROGRESS NOTE ADULT - PROBLEM SELECTOR PLAN 5
Pt noted to have LFTS elevation   AST/ALT/ALP- 254/138/100  F/U LFT am Pt noted to have LFTS elevation    > 161   > 56  AlkP 100 >83  - daily CMP  - trending down  - NPO, IVF continue

## 2021-03-04 NOTE — PROGRESS NOTE ADULT - PROBLEM SELECTOR PLAN 3
pt K on admission was pt K on admission was 6.2 with mod hemolysis  increased serum lipids (hyper Tg or mixed hypercholesterolemia) can present as milky or strawberry milky (hemolysis inducing)  - amount of hemolysis appears to increase as the lipid concentration increases; a sort of PSEUDO hyper K due to lipid induced hemolysis  - EKG without changes, no peaked T waves  - repeat K 6.2 with same mod hemolysis  - given 10 U lantus D50 and Lokelma 15g  - continue to monitor BMP, EKG  - low threshold for ICU

## 2021-03-04 NOTE — PATIENT PROFILE ADULT - STATED REASON FOR ADMISSION
Pt states PCP called him to give lab results and stated he should go to ED bc Cholesterol was over 8000

## 2021-03-04 NOTE — PROGRESS NOTE ADULT - PROBLEM SELECTOR PLAN 4
Na- 125   -Sodium, Corrected= 135  hyponatremia likely pseudohyponatremia 2/2 TG elevation  F/u BMp Am Na- 125   -Sodium, Corrected= 135  hyponatremia likely pseudohyponatremia 2/2 TG elevation  F/u BMP Am : 125 > 129 > 132  - trend BMP and monitor electrolytes  - c/w aggressive  cc NS per MICU

## 2021-03-04 NOTE — H&P ADULT - NSHPLABSRESULTS_GEN_ALL_CORE
13.7   7.66  )-----------( 254      ( 03 Mar 2021 23:27 )             38.8       03-03    125<L>  |  95<L>  |  18  ----------------------------<  382<H>  6.2<HH>   |  20<L>  |  1.14    Ca    6.8<L>      03 Mar 2021 23:27    TPro  8.2  /  Alb  3.5  /  TBili  1.2  /  DBili  x   /  AST  254<H>  /  ALT  138<H>  /  AlkPhos  100  03-03                  PT/INR - ( 03 Mar 2021 23:27 )   PT: 10.9 sec;   INR: 0.91 ratio         PTT - ( 03 Mar 2021 23:27 )  PTT:37.2 sec    Lactate Trend      CARDIAC MARKERS ( 03 Mar 2021 23:27 )  <0.015 ng/mL / x     / x     / x     / x            CAPILLARY BLOOD GLUCOSE

## 2021-03-04 NOTE — H&P ADULT - HISTORY OF PRESENT ILLNESS
40M, from home, self ambulatory  with a PMH of HTN, HLD, DM, h/o Pancreatitis, h/o TG elevation, Chronic Alcohol Abuse, Chronic nicotine user through vaping, Paraspinal hernia of back s/p surgery who presented with a chief complaint of hypertriglyceridemia.  Patient states that he was seen by his PCP for his routine appointment and his blood work was s/o  cholesterol level in 8000s and was recommended to visit the ED. Pt is asymptomatic and denies any abdominal pain, N/V/D or any other complaints.   Pt was admitted at [facility] In Feb ICU, for Acute panreatitis and elevated TG. Pt was started on Insulin drip on prior admission and was sent home with insulin and Lopid. Pt mentions that during around Dec2020- Jan2021, he ran out of his refills for insulin and lopid and did not have medications for almost 2 months.   Pt mentions taking vape, takes 4-5 beers/2weeks and denies any substance abuse.   FHX- not significant     In the ED,   Pt is AAOX3, no signs of any distress   Vitals- 1278/88, hr 115/ afebrile   RBS- 382  Na 125  AST//138  TG- 5048, Cholesterol 372, HDL 32  Trops x 1 negative     ICU attending Dr. Frank was called by Dr. Burns. Since the pt is asymptomatic no indication for ICU admission.

## 2021-03-04 NOTE — H&P ADULT - PROBLEM SELECTOR PLAN 6
IMPROVE VTE Individual Risk Assessment    RISK                                                          Points  [] Previous VTE                                           3  [] Thrombophilia                                        2  [] Lower limb paralysis                              2   [] Current Cancer                                       2   [X] Immobilization > 24 hrs                        1  [] ICU/CCU stay > 24 hours                       1  [] Age > 60                                                   1    IMPROVE VTE Score: LOVENOX  PPI

## 2021-03-04 NOTE — CONSULT NOTE ADULT - ASSESSMENT
40 year old male  from home, self ambulatory  with a PMH of HTN, HLD, DM, h/o Pancreatitis, h/o TG elevation, Chronic Alcohol Abuse, Chronic nicotine user through vaping, Paraspinal hernia of back s/p surgery who presented with a chief complaint of hypertriglyceridemia.      Hypertriglyceridemia:      Patient presented with triglycerides of 5000.      would recommend to Keep the patient NPO.  start the patient on Lantus 20 units  and moderate Admelog sliding scale q4.  -start on Lopid 600 bid and Lovaza 2 gm bid.  -start  patient on Lipitor 40  -repeat TG levels in pm.  -If TG are not improving patient should be Evaluated by ICU for IV insulin as patient is high rish of progressing to acute pancreatitis.  - FS q4  -Patient counseled on Alcohol cessation and low carb low fat diet      Uncontrolled Diabetes Mellitus:     Patient with hx of Diabetes.  FS was > 300  follow A1c .  for now will keep the Patient NPO for Hypertriglyceridemia.  continue with Lantus 20 and moderate sliding scale q4.  will re evaluate regimen once patient is on diet.                            40 year old male  from home, self ambulatory  with a PMH of HTN, HLD, DM, h/o Pancreatitis, h/o TG elevation, Chronic Alcohol Abuse, Chronic nicotine user through vaping, Paraspinal hernia of back s/p surgery who presented with a chief complaint of hypertriglyceridemia.      Hypertriglyceridemia:      Patient presented with triglycerides of 5000.      would recommend to Keep the patient NPO.  start the patient on Lantus 20 units  and moderate Admelog sliding scale q4.  -start on Lopid 600 bid and Lovaza 2 gm bid.  -start  patient on Lipitor 40  -repeat TG levels   -If TG are not improving patient should be Evaluated by ICU for IV insulin as patient is high risk of progressing to acute pancreatitis.  - FS q4  -Patient counseled on Alcohol cessation and low carb low fat diet  nutrition eval      Uncontrolled Diabetes Mellitus:     Patient with hx of Diabetes.  FS was > 300  follow A1c .  for now will keep the Patient NPO for Hypertriglyceridemia.  continue with Lantus 20 and moderate sliding scale q4.  will re evaluate regimen once patient is on diet.

## 2021-03-04 NOTE — H&P ADULT - PROBLEM SELECTOR PLAN 3
Na- 125   -Sodium, Corrected= 135  hyponatremia likely pseudohyponatremia 2/2 TG elevation  F/u BMp Am

## 2021-03-04 NOTE — H&P ADULT - PROBLEM SELECTOR PLAN 2
“You can access the [facility] Patient Portal, offered by [facility], by registering with the following website: [email or web address]” Pt has PMH of DM   Pt home regimen- Basaglar 20U evening and Humalog 12U TID  Started on lantus, admelog and HSS  f/u FS and A1C  issues of non compliance  Endo- Dr. Pina

## 2021-03-04 NOTE — CONSULT NOTE ADULT - SUBJECTIVE AND OBJECTIVE BOX
Patient is a 40y old  Male who presents with a chief complaint of     HPI:  40M, from home, self ambulatory  with a PMH of HTN, HLD, DM, h/o Pancreatitis, h/o TG elevation, Chronic Alcohol Abuse, Chronic nicotine user through vaping, Paraspinal hernia of back s/p surgery who presented with a chief complaint of hypertriglyceridemia.  Patient states that he was seen by his PCP for his routine appointment and his blood work was s/o  cholesterol level in 8000s and was recommended to visit the ED. Pt is asymptomatic and denies any abdominal pain, N/V/D or any other complaints.   Pt was admitted at [facility] In Feb ICU, for Acute panreatitis and elevated TG. Pt was started on Insulin drip on prior admission and was sent home with insulin and Lopid. Pt mentions that during around - , he ran out of his refills for insulin and lopid and did not have medications for almost 2 months.   Pt mentions taking vape, takes 4-5 beers/2weeks and denies any substance abuse.   FHX- not significant     In the ED,   Pt is AAOX3, no signs of any distress   Vitals- 1278/88, hr 115/ afebrile   RBS- 382  Na 125  AST//138  TG- 5048, Cholesterol 372, HDL 32  Trops x 1 negative     ICU attending Dr. Frank was called by Dr. Burns. Since the pt is asymptomatic no indication for ICU admission.    (04 Mar 2021 02:52)      Allergies    penicillin (Rash)    Intolerances        MEDICATIONS  (STANDING):  aspirin enteric coated 81 milliGRAM(s) Oral daily  atorvastatin 80 milliGRAM(s) Oral at bedtime  enoxaparin Injectable 40 milliGRAM(s) SubCutaneous daily  gemfibrozil 600 milliGRAM(s) Oral two times a day  hydrochlorothiazide 25 milliGRAM(s) Oral daily  insulin glargine Injectable (LANTUS) 20 Unit(s) SubCutaneous at bedtime  insulin lispro (ADMELOG) corrective regimen sliding scale   SubCutaneous Before meals and at bedtime  insulin lispro Injectable (ADMELOG) 12 Unit(s) SubCutaneous three times a day before meals  omega-3-Acid Ethyl Esters 2 Gram(s) Oral two times a day  pantoprazole    Tablet 40 milliGRAM(s) Oral before breakfast  sodium chloride 0.9% lock flush 3 milliLiter(s) IV Push every 8 hours  sodium zirconium cyclosilicate 10 Gram(s) Oral once    MEDICATIONS  (PRN):      Daily Height in cm: 170.18 (04 Mar 2021 06:56)    Daily Weight in k.9 (04 Mar 2021 05:51)    Drug Dosing Weight  Height (cm): 170.2 (04 Mar 2021 06:56)  Weight (kg): 100 (04 Mar 2021 06:56)  BMI (kg/m2): 34.5 (04 Mar 2021 06:56)  BSA (m2): 2.11 (04 Mar 2021 06:56)    PAST MEDICAL & SURGICAL HISTORY:  ETOH abuse    Diabetes    History of hypertension    LBP radiating to right leg    S/P lumbar discectomy          FAMILY HISTORY:  No pertinent family history        SOCIAL HISTORY:    ADVANCE DIRECTIVES:    REVIEW OF SYSTEMS:    CONSTITUTIONAL: No fever, weight loss, or fatigue  EYES: No eye pain, visual disturbances, or discharge  ENMT:  No difficulty hearing, tinnitus, vertigo; No sinus or throat pain  NECK: No pain or stiffness  BREASTS: No pain, masses, or nipple discharge  RESPIRATORY: No cough, wheezing, chills or hemoptysis; No shortness of breath  CARDIOVASCULAR: No chest pain, palpitations, dizziness, or leg swelling  GASTROINTESTINAL: No abdominal or epigastric pain. No nausea, vomiting, or hematemesis; No diarrhea or constipation. No melena or hematochezia.  GENITOURINARY: No dysuria, frequency, hematuria, or incontinence  NEUROLOGICAL: No headaches, memory loss, loss of strength, numbness, or tremors  SKIN: No itching, burning, rashes, or lesions   LYMPH NODES: No enlarged glands  ENDOCRINE: No heat or cold intolerance; No hair loss  MUSCULOSKELETAL: No joint pain or swelling; No muscle, back, or extremity pain  PSYCHIATRIC: No depression, anxiety, mood swings, or difficulty sleeping  HEME/LYMPH: No easy bruising, or bleeding gums  ALLERGY AND IMMUNOLOGIC: No hives or eczema          ICU Vital Signs Last 24 Hrs  T(C): 36.7 (04 Mar 2021 05:51), Max: 37.1 (03 Mar 2021 22:34)  T(F): 98 (04 Mar 2021 05:51), Max: 98.7 (03 Mar 2021 22:34)  HR: 86 (04 Mar 2021 05:51) (86 - 115)  BP: 130/82 (04 Mar 2021 05:51) (123/83 - 130/82)  BP(mean): --  ABP: --  ABP(mean): --  RR: 18 (04 Mar 2021 05:51) (16 - 18)  SpO2: 97% (04 Mar 2021 05:51) (95% - 97%)          I&O's Detail      PHYSICAL EXAM:    GENERAL: NAD, well-groomed, well-developed  HEAD:  Atraumatic, Normocephalic  EYES: EOMI, PERRLA, conjunctiva and sclera clear  ENMT: No tonsillar erythema, exudates, or enlargement; Moist mucous membranes, Good dentition, No lesions  NECK: Supple, No JVD, Normal thyroid  NERVOUS SYSTEM:  Alert & Oriented X3, Good concentration; Motor Strength 5/5 B/L upper and lower extremities; DTRs 2+ intact and symmetric  CHEST/LUNG: Clear to percussion bilaterally; No rales, rhonchi, wheezing, or rubs  HEART: Regular rate and rhythm; No murmurs, rubs, or gallops  ABDOMEN: Soft, Nontender, Nondistended; Bowel sounds present  EXTREMITIES:  2+ Peripheral Pulses, No clubbing, cyanosis, or edema  LYMPH: No lymphadenopathy noted  SKIN: No rashes or lesions    LABS:  CBC Full  -  ( 03 Mar 2021 23:27 )  WBC Count : 7.66 K/uL  RBC Count : 4.37 M/uL  Hemoglobin : 13.7 g/dL  Hematocrit : 38.8 %  Platelet Count - Automated : 254 K/uL  Mean Cell Volume : 88.8 fl  Mean Cell Hemoglobin : 31.4 pg  Mean Cell Hemoglobin Concentration : 35.3 gm/dL  Auto Neutrophil # : 4.98 K/uL  Auto Lymphocyte # : 1.99 K/uL  Auto Monocyte # : 0.54 K/uL  Auto Eosinophil # : 0.15 K/uL  Auto Basophil # : 0.00 K/uL  Auto Neutrophil % : 65.0 %  Auto Lymphocyte % : 26.0 %  Auto Monocyte % : 7.0 %  Auto Eosinophil % : 2.0 %  Auto Basophil % : 0.0 %        129<L>  |  99  |  x   ----------------------------<  x   x    |  20<L>  |  x     Ca    6.8<L>      03 Mar 2021 23:27    TPro  8.2  /  Alb  3.5  /  TBili  1.2  /  DBili  x   /  AST  254<H>  /  ALT  138<H>  /  AlkPhos  100  -    CAPILLARY BLOOD GLUCOSE      POCT Blood Glucose.: 307 mg/dL (04 Mar 2021 09:44)    PT/INR - ( 03 Mar 2021 23:27 )   PT: 10.9 sec;   INR: 0.91 ratio         PTT - ( 03 Mar 2021 23:27 )  PTT:37.2 sec    CARDIAC MARKERS ( 03 Mar 2021 23:27 )  <0.015 ng/mL / x     / x     / x     / x              EKG:    ECHO, US:    RADIOLOGY:    CRITICAL CARE TIME SPENT:   Patient is a 40y old  Male who presents with a chief complaint of High triglycerides     HPI:  40M, from home, self ambulatory  with a PMH of HTN, HLD, DM, h/o Pancreatitis, h/o TG elevation, Chronic Alcohol Abuse, Chronic nicotine user through vaping, Paraspinal hernia of back s/p surgery who presented with a chief complaint of hypertriglyceridemia.  Patient states that he was seen by his PCP for his routine appointment and his blood work was s/o  cholesterol level in 8000s and was recommended to visit the ED. Pt is asymptomatic and denies any abdominal pain, N/V/D or any other complaints.   Pt was admitted at [facility] In Feb ICU, for Acute panreatitis and elevated TG. Pt was started on Insulin drip on prior admission and was sent home with insulin and Lopid. Pt mentions that during around - , he ran out of his refills for insulin and lopid and did not have medications for almost 2 months.   Pt mentions taking vape, takes 4-5 beers/2weeks and denies any substance abuse.   FHX- not significant     In the ED, Patient was awake alert, VS were stable. He was admitted for hypertriglyceridemia. He was started on Lopid and high intensity statin. Patient was seen by endocrinologist, started on Lovaza and kept NPO. ICU consultation was requested. Patient is awake, not in  any distress and denies abdominal pain, lipase WNL, no signs of acute pancreatitis.         Allergies    penicillin (Rash)    Intolerances        MEDICATIONS  (STANDING):  aspirin enteric coated 81 milliGRAM(s) Oral daily  atorvastatin 80 milliGRAM(s) Oral at bedtime  enoxaparin Injectable 40 milliGRAM(s) SubCutaneous daily  gemfibrozil 600 milliGRAM(s) Oral two times a day  hydrochlorothiazide 25 milliGRAM(s) Oral daily  insulin glargine Injectable (LANTUS) 20 Unit(s) SubCutaneous at bedtime  insulin lispro (ADMELOG) corrective regimen sliding scale   SubCutaneous Before meals and at bedtime  insulin lispro Injectable (ADMELOG) 12 Unit(s) SubCutaneous three times a day before meals  omega-3-Acid Ethyl Esters 2 Gram(s) Oral two times a day  pantoprazole    Tablet 40 milliGRAM(s) Oral before breakfast  sodium chloride 0.9% lock flush 3 milliLiter(s) IV Push every 8 hours  sodium zirconium cyclosilicate 10 Gram(s) Oral once    MEDICATIONS  (PRN):      Daily Height in cm: 170.18 (04 Mar 2021 06:56)    Daily Weight in k.9 (04 Mar 2021 05:51)    Drug Dosing Weight  Height (cm): 170.2 (04 Mar 2021 06:56)  Weight (kg): 100 (04 Mar 2021 06:56)  BMI (kg/m2): 34.5 (04 Mar 2021 06:56)  BSA (m2): 2.11 (04 Mar 2021 06:56)    PAST MEDICAL & SURGICAL HISTORY:  ETOH abuse    Diabetes    History of hypertension    LBP radiating to right leg    S/P lumbar discectomy          FAMILY HISTORY:  No pertinent family history      ADVANCE DIRECTIVES: FULL CODE     REVIEW OF SYSTEMS:    CONSTITUTIONAL: No fever, weight loss, or fatigue  EYES: No eye pain, visual disturbances, or discharge  ENMT:  No difficulty hearing, tinnitus, vertigo; No sinus or throat pain  NECK: No pain or stiffness  BREASTS: No pain, masses, or nipple discharge  RESPIRATORY: No cough, wheezing, chills or hemoptysis; No shortness of breath  CARDIOVASCULAR: No chest pain, palpitations, dizziness, or leg swelling  GASTROINTESTINAL: No abdominal or epigastric pain. No nausea, vomiting, or hematemesis; No diarrhea or constipation. No melena or hematochezia.  GENITOURINARY: No dysuria, frequency, hematuria, or incontinence  NEUROLOGICAL: No headaches, memory loss, loss of strength, numbness, or tremors  SKIN: No itching, burning, rashes, or lesions   LYMPH NODES: No enlarged glands  ENDOCRINE: No heat or cold intolerance; No hair loss  MUSCULOSKELETAL: No joint pain or swelling; No muscle, back, or extremity pain  PSYCHIATRIC: No depression, anxiety, mood swings, or difficulty sleeping  HEME/LYMPH: No easy bruising, or bleeding gums  ALLERGY AND IMMUNOLOGIC: No hives or eczema          ICU Vital Signs Last 24 Hrs  T(C): 36.7 (04 Mar 2021 05:51), Max: 37.1 (03 Mar 2021 22:34)  T(F): 98 (04 Mar 2021 05:51), Max: 98.7 (03 Mar 2021 22:34)  HR: 86 (04 Mar 2021 05:51) (86 - 115)  BP: 130/82 (04 Mar 2021 05:51) (123/83 - 130/82)  BP(mean): --  ABP: --  ABP(mean): --  RR: 18 (04 Mar 2021 05:51) (16 - 18)  SpO2: 97% (04 Mar 2021 05:51) (95% - 97%)          I&O's Detail      PHYSICAL EXAM:    GENERAL: NAD, well-groomed, well-developed  HEAD:  Atraumatic, Normocephalic  EYES: EOMI, PERRLA, conjunctiva and sclera clear  ENMT: No tonsillar erythema, exudates, or enlargement; Moist mucous membranes, Good dentition, No lesions  NECK: Supple, No JVD, Normal thyroid  NERVOUS SYSTEM:  Alert & Oriented X3, Good concentration; Motor Strength 5/5 B/L upper and lower extremities; DTRs 2+ intact and symmetric  CHEST/LUNG: ; No rales, rhonchi, wheezing, or rubs  HEART: Regular rate and rhythm; No murmurs, rubs, or gallops  ABDOMEN: Soft, Nontender, Nondistended; Bowel sounds present  EXTREMITIES:  2+ Peripheral Pulses, No clubbing, cyanosis, or edema   SKIN: No rashes or lesions    LABS:  CBC Full  -  ( 03 Mar 2021 23:27 )  WBC Count : 7.66 K/uL  RBC Count : 4.37 M/uL  Hemoglobin : 13.7 g/dL  Hematocrit : 38.8 %  Platelet Count - Automated : 254 K/uL  Mean Cell Volume : 88.8 fl  Mean Cell Hemoglobin : 31.4 pg  Mean Cell Hemoglobin Concentration : 35.3 gm/dL  Auto Neutrophil # : 4.98 K/uL  Auto Lymphocyte # : 1.99 K/uL  Auto Monocyte # : 0.54 K/uL  Auto Eosinophil # : 0.15 K/uL  Auto Basophil # : 0.00 K/uL  Auto Neutrophil % : 65.0 %  Auto Lymphocyte % : 26.0 %  Auto Monocyte % : 7.0 %  Auto Eosinophil % : 2.0 %  Auto Basophil % : 0.0 %        129<L>  |  99  |  x   ----------------------------<  x   x    |  20<L>  |  x     Ca    6.8<L>      03 Mar 2021 23:27    TPro  8.2  /  Alb  3.5  /  TBili  1.2  /  DBili  x   /  AST  254<H>  /  ALT  138<H>  /  AlkPhos  100      CAPILLARY BLOOD GLUCOSE      POCT Blood Glucose.: 307 mg/dL (04 Mar 2021 09:44)    PT/INR - ( 03 Mar 2021 23:27 )   PT: 10.9 sec;   INR: 0.91 ratio         PTT - ( 03 Mar 2021 23:27 )  PTT:37.2 sec    CARDIAC MARKERS ( 03 Mar 2021 23:27 )  <0.015 ng/mL / x     / x     / x     / x              EKG:    ECHO, US:    RADIOLOGY:    CRITICAL CARE TIME SPENT:

## 2021-03-04 NOTE — PROGRESS NOTE ADULT - PROBLEM SELECTOR PLAN 2
Pt has PMH of DM   Pt home regimen- Basaglar 20U evening and Humalog 12U TID  Started on lantus, admelog and HSS  f/u FS and A1C  issues of non compliance  Endo- Dr. Pina Pt has PMH of DM, home regimen- Basaglar 20U evening and Humalog 12U TID with issues of non compliance 2/2 insurance cost issues  - SW consulted  ENDO Dr. Pina  - - Patient with hx of Diabetes.  - - FS was > 300  - - A1c result pending  - - will keep the Patient NPO for Hypertriglyceridemia.  - - Lantus 20 and moderate sliding scale q 4.  - - re evaluate regimen once patient is on diet.

## 2021-03-04 NOTE — H&P ADULT - PROBLEM SELECTOR PLAN 1
Pt was sent by PCP for elevated TG ? 8000  Triglycerides in ED -  5,048  -Lipase= 149  -Will start patient on Aspirin  -Will also start patient on High-Intensity Statin & Zetia 10mg PO qd  - Endo-Dr. Pina   -Patient will need to follow-up with a Lipid specialist as an outpatient after discharge  Pt had missed 2 months of his lopid due to financial/ insurance issues   f/u SW

## 2021-03-04 NOTE — H&P ADULT - ATTENDING COMMENTS
Patient is a 40 year old male with a PMH of HTN, HLD, DM, h/o Pancreatitis, Chronic Alcohol Abuse, Chronic nicotine user through vaping, Paraspinal hernia of back s/p surgery who presented with a chief complaint of hypertriglyceridemia.  Patient states that he was seen by his PCP to review his blood work, which identified a cholesterol level in 8000s.    Of note, patient states that he stopped taking his Lopid for several months because his PCP reportedly stopped filling this medication.  However, patient endorses compliance with this medication as of January 2021.    At time of examination in the ED, patient denies any headache, dizziness, chest pain, palpitations, shortness of breath, abdominal pain, nausea/vomiting/diarrhea.    T(C): 37.1 (03-03-21 @ 22:34), Max: 37.1 (03-03-21 @ 22:34)  T(F): 98.7 (03-03-21 @ 22:34), Max: 98.7 (03-03-21 @ 22:34)  HR: 115 (03-03-21 @ 22:34) (115 - 115)  BP: 127/88 (03-03-21 @ 22:34) (127/88 - 127/88)  RR: 16 (03-03-21 @ 22:34) (16 - 16)  SpO2: 97% (03-03-21 @ 22:34) (97% - 97%)  Wt(kg): --    P/E: As above MAR    A/P:    Hypertriglyceridemia possibly due to Familial Dyslipidemia (IV- Hypertriglyceridemia):  -Triglycerides= 5,048  -Lipase= 149  -Will start patient on Aspirin  -Will also start patient on High-Intensity Statin  -In addition, will start patient on Zetia 10mg PO qd  -Will ask Endo to evaluate patient for further agent recommendations  -Patient will need to follow-up with a Lipid specialist as an outpatient after discharge    Moderate Hyperkalemia:  -Potassium= 6.2 (Hemolyzed)  -Currently awaiting repeat potassium level in ED  -If repeat level remains elevated, will administer Calcium Gluconate 1gm, D50% IV Push, Regular Insulin 10units SUBQ and Lokelma 10gm PO Once  -Will continue to closely monitor plasma K+ level as well as EKG and continue to treat PRN    Pseudohyponatremia:  -Triglycerides= 5,048  -Sodium, Serum= 125  -Sodium, Corrected= 135  -Will continue to closely monitor    HTN:  -Will resume patient's home medication  -Vital Signs Q4H    DM:  -Hemoglobin A1c= 11.9% on 2/22/2020  -Hemoglobin A1c with AM labs  -Blood Glucose Monitoring ACHS  -Regular Insulin Sliding Scale ACHS  -Carb Controlled, Heart Healthy, Low Sodium diet as tolerated    Chronic Alcohol Abuse:  -Will send Alcohol, serum and UDS    Hypocalcemia:  -Will send Parathyroid Level, Ionized Calcium, Vitamin D Level    GI/DVT PPx:  -Lovenox  -PPI

## 2021-03-04 NOTE — H&P ADULT - ASSESSMENT
40M, from home, self ambulatory  with a PMH of HTN, HLD, DM, h/o Pancreatitis, h/o TG elevation, Chronic Alcohol Abuse, Chronic nicotine user through vaping, Paraspinal hernia of back s/p surgery who presented with a chief complaint of hypertriglyceridemia.   Pt admitted for High TG levels.

## 2021-03-04 NOTE — CHART NOTE - NSCHARTNOTEFT_GEN_A_CORE
Patient is NPO for hypertriglyceridemia. I asked RN to give just 10 units of Lantus. Primary team to follow up AM Patient is NPO for hypertriglyceridemia. I asked RN to give just 10 units of Lantus instead of 20 units. Primary team to follow up AM      [id number] Patient is NPO for hypertriglyceridemia. I asked RN to give just 10 units of Lantus instead of 20 units. Primary team to follow up AM Patient is NPO for hypertriglyceridemia. I asked RN to give just 10 units of Lantus instead of 20 units. Primary team to follow up AM.    UPDATE: Endocrinology note was reviewed. Recommended to give 20 units of lantus. Will give the patient additional 10 units of Lantus. Patient is NPO for hypertriglyceridemia. I asked RN to give just 10 units of Lantus instead of 20 units. Primary team to follow up AM.    UPDATE: Endocrinology note was reviewed. Recommended to give 20 units of lantus. Will give the patient additional 10 units of Lantus so the total will be 20 units as recommended by Endocrinologist.

## 2021-03-04 NOTE — PROGRESS NOTE ADULT - PROBLEM SELECTOR PLAN 1
Pt was sent by PCP for elevated TG ? 8000  Triglycerides in ED -  5,048  -Lipase= 149  -Will start patient on Aspirin  -Will also start patient on High-Intensity Statin & Zetia 10mg PO qd  - Endo-Dr. Pina   -Patient will need to follow-up with a Lipid specialist as an outpatient after discharge  Pt had missed 2 months of his lopid due to financial/ insurance issues   f/u SW Pt was sent by PCP for elevated TG ? reported ? 8000  Triglycerides in ED -  5,048  Lipase= 149  - started Aspirin  - started High-Intensity Statin & Zetia 10mg PO qd  - f/u Creat Kinase and LDH for cardiomypathy risk (per risk benefit patient requires these medications and will closely monitor for cardiac dysfunction)  ICU / MICU CONSULT  ENDOCRINE Dr. Pina   - - keep NPO  - - Lopid 600 BID  - - Lipitor 40 QD  - - f/u repeat Tg in PM, if elevates re-consult MICU for IV Insulin ggt  - - FS q4  - - discussed diet and alcohol cessation  - will need o/p Lipid specialist after discharge  - missed 2 months of his lopid due to financial/ insurance issues   - f/u SW consult Pt was sent by PCP for elevated TG ? reported ? 8000  Triglycerides in ED -  5,048, Lipase= 149  - started Aspirin  - started High-Intensity Statin & Zetia 10mg PO qd  - f/u Creat Kinase and LDH for cardiomypathy risk (per risk benefit patient requires these medications and will closely monitor for cardiac dysfunction)  ICU / MICU CONSULTED : patient asymptomatic, re-eval if Tg elevates or patient gets symptomatic  ENDOCRINE Dr. Pina   - - keep NPO  - - Lopid 600 BID  - - Lipitor 40 QD  - - f/u repeat Tg in PM, if elevates re-consult MICU for IV Insulin ggt  - - FS q4  - - discussed diet and alcohol cessation  - will need o/p Lipid specialist after discharge  - f/u SW consult

## 2021-03-04 NOTE — CONSULT NOTE ADULT - ATTENDING COMMENTS
Assessment:  1. Hypertriglyceridemia   2. Diabetes mellitus   3. Class 1 obesity   4. Hypertension   5. Hyperkalemia     Plan  - At time of evaluation no obvious signs of pancreatitis, no head aches, no visual changes, no focal neurologic defecits to suggest hyperviscosity syndrome  - Started on statins and fibrates by endocrinology  - Basal/bolus insulin regimen   - Recommend aggressive IV fluid resuscitation   - Monitor for signs of pancreatitis  - NPO for now  - triglyceride level appears to be downtrending since admission, will continue to monitor for now  - Cont. current management, if develops signs of pancreatitis or worsening hypertriglyceridemia reconsult ICU for possible need of insulin infusion

## 2021-03-04 NOTE — CONSULT NOTE ADULT - ASSESSMENT
40M, from home, self ambulatory  with a PMH of HTN, HLD, DM, h/o Pancreatitis, h/o TG elevation, Chronic Alcohol Abuse, Chronic nicotine user through vaping, Paraspinal hernia of back s/p surgery who presented with a chief complaint of hypertriglyceridemia. Patient's triglycerides were noted to be in 4000's, no signs of acute pancreatitis. ICU consult requested for possible need of insulin drip.     Hypertriglyceridemia   DM  Pseudohyponatremia  Hyperkalemia       40M, from home, self ambulatory  with a PMH of HTN, HLD, DM, h/o Pancreatitis, h/o TG elevation, Chronic Alcohol Abuse, Chronic nicotine user through vaping, Paraspinal hernia of back s/p surgery who presented with a chief complaint of hypertriglyceridemia. Patient's triglycerides were noted to be in 4000's, no signs of acute pancreatitis. ICU consult requested for possible need of insulin drip.     Hypertriglyceridemia-  - Patient noted to have  hypertriglyceridemia, TG 5048 upon arrival   -Patient has  no signs of acute pancreatitis at this time, no abd pain, nausea vomiting, lipase WNL   -Would recommend to c/w high intensity statin, Lovaza and gemfibrozil   -C/ w Insulin HSS q 4 hrs and Lantus   -Start him on IVF NS @ 125cc/hr   -Keep NPO  -Repeat Triglyceride noted to be 4748, slightly decreased since admission   -would recommend to repeat TG in pm  -Patient can be managed on floor at this time, if despite above measures TG continues to uptrend, may need insulin at that time. Please re-consult ICU     DM-  C/w insulin   Follow  HbA1C   Endocrinologist rec appreciated     Pseudohyponatremia:  Patient noted to have  pseudohyponatremia in the setting of elevated Blood glucose and lipids   Corrected for glucose- 132     Hyperkalemia :   s/p cocktail.   Repeat BMP showed moderately hemolyzed sample   Would recommend to repeat BMP

## 2021-03-04 NOTE — CONSULT NOTE ADULT - SUBJECTIVE AND OBJECTIVE BOX
Patient is a 40y old  Male who presents with a chief complaint of     HPI:  40M, from home, self ambulatory  with a PMH of HTN, HLD, DM, h/o Pancreatitis, h/o TG elevation, Chronic Alcohol Abuse, Chronic nicotine user through vaping, Paraspinal hernia of back s/p surgery who presented with a chief complaint of hypertriglyceridemia.  Patient states that he was seen by his PCP for his routine appointment and his blood work was s/o  cholesterol level in 8000s and was recommended to visit the ED. Pt is asymptomatic and denies any abdominal pain, N/V/D or any other complaints.   Pt was admitted at [facility] In Feb ICU, for Acute panreatitis and elevated TG. Pt was started on Insulin drip on prior admission and was sent home with insulin and Lopid. Pt mentions that during around Dec2020- Jan2021, he ran out of his refills for insulin and lopid and did not have medications for almost 2 months.   Pt mentions taking vape, takes 4-5 beers/2weeks and denies any substance abuse.   FHX- not significant     In the ED,   Pt is AAOX3, no signs of any distress   Vitals- 1278/88, hr 115/ afebrile   RBS- 382  Na 125  AST//138  TG- 5048, Cholesterol 372, HDL 32  Trops x 1 negative     ICU attending Dr. Frank was called by Dr. Burns. Since the pt is asymptomatic no indication for ICU admission.    (04 Mar 2021 02:52)      PAST MEDICAL & SURGICAL HISTORY:  ETOH abuse    Diabetes    History of hypertension    LBP radiating to right leg    S/P lumbar discectomy  2005           MEDICATIONS  (STANDING):  aspirin enteric coated 81 milliGRAM(s) Oral daily  atorvastatin 80 milliGRAM(s) Oral at bedtime  enoxaparin Injectable 40 milliGRAM(s) SubCutaneous daily  gemfibrozil 600 milliGRAM(s) Oral two times a day  hydrochlorothiazide 25 milliGRAM(s) Oral daily  insulin glargine Injectable (LANTUS) 20 Unit(s) SubCutaneous at bedtime  insulin lispro (ADMELOG) corrective regimen sliding scale   SubCutaneous Before meals and at bedtime  insulin lispro Injectable (ADMELOG) 12 Unit(s) SubCutaneous three times a day before meals  omega-3-Acid Ethyl Esters 2 Gram(s) Oral two times a day  pantoprazole    Tablet 40 milliGRAM(s) Oral before breakfast  sodium chloride 0.9% lock flush 3 milliLiter(s) IV Push every 8 hours    MEDICATIONS  (PRN):      FAMILY HISTORY:  No pertinent family history        SOCIAL HISTORY:      REVIEW OF SYSTEMS:  CONSTITUTIONAL: No fever, weight loss, or fatigue  EYES: No eye pain, visual disturbances, or discharge  ENT:  No difficulty hearing, tinnitus, vertigo; No sinus or throat pain  NECK: No pain or stiffness  RESPIRATORY: No cough, wheezing, chills or hemoptysis; No Shortness of Breath  CARDIOVASCULAR: No chest pain, palpitations, passing out, dizziness, or leg swelling  GASTROINTESTINAL: No abdominal or epigastric pain. No nausea, vomiting, or hematemesis; No diarrhea or constipation. No melena or hematochezia.  GENITOURINARY: No dysuria, frequency, hematuria, or incontinence  NEUROLOGICAL: No headaches, memory loss, loss of strength, numbness, or tremors  SKIN: No itching, burning, rashes, or lesions   LYMPH Nodes: No enlarged glands  ENDOCRINE: No heat or cold intolerance; No hair loss  MUSCULOSKELETAL: No joint pain or swelling; No muscle, back, or extremity pain  PSYCHIATRIC: No depression, anxiety, mood swings, or difficulty sleeping  HEME/LYMPH: No easy bruising, or bleeding gums  ALLERGY AND IMMUNOLOGIC: No hives or eczema	        Vital Signs Last 24 Hrs  T(C): 36.7 (04 Mar 2021 05:51), Max: 37.1 (03 Mar 2021 22:34)  T(F): 98 (04 Mar 2021 05:51), Max: 98.7 (03 Mar 2021 22:34)  HR: 86 (04 Mar 2021 05:51) (86 - 115)  BP: 130/82 (04 Mar 2021 05:51) (123/83 - 130/82)  BP(mean): --  RR: 18 (04 Mar 2021 05:51) (16 - 18)  SpO2: 97% (04 Mar 2021 05:51) (95% - 97%)      Constitutional:    NC/AT:    HEENT:    Neck:  No JVD, bruits or thyromegaly    Respiratory:  Clear without rales or rhonchi    Cardiovascular:  RR without murmur, rub or gallop.    Gastrointestinal: Soft without hepatosplenomegaly.    Extremities: without cyanosis, clubbing or edema.    Neurological:  Oriented   x      . No gross sensory or motor defects.        LABS:                        14.0   5.46  )-----------( 256      ( 04 Mar 2021 09:09 )             41.5     03-04    129<L>  |  99  |  16  ----------------------------<  318<H>  6.2<HH>   |  20<L>  |  0.93    Ca    6.9<L>      04 Mar 2021 09:09  Mg     2.3     03-04    TPro  7.4  /  Alb  3.5  /  TBili  0.9  /  DBili  x   /  AST  x   /  ALT  x   /  AlkPhos  x   03-04    CARDIAC MARKERS ( 03 Mar 2021 23:27 )  <0.015 ng/mL / x     / x     / x     / x          PT/INR - ( 03 Mar 2021 23:27 )   PT: 10.9 sec;   INR: 0.91 ratio         PTT - ( 03 Mar 2021 23:27 )  PTT:37.2 sec    CAPILLARY BLOOD GLUCOSE      POCT Blood Glucose.: 307 mg/dL (04 Mar 2021 09:44)      RADIOLOGY & ADDITIONAL STUDIES:

## 2021-03-04 NOTE — PROGRESS NOTE ADULT - SUBJECTIVE AND OBJECTIVE BOX
PGY-1 Progress Note discussed with attending    PAGER #: [[phone]] TILL 5:00 PM  PLEASE CONTACT ON CALL TEAM:   - On Call Team (Please refer to eCdrick) FROM 5:00 PM - 8:30PM  - Nightfloat Team FROM 8:30 -7:30 AM    CHIEF COMPLAINT & BRIEF HOSPITAL COURSE:  41 yo M, home, ambulatory w/ HTN, HLD, DM, h/o pancreatitis, h/o TG elevation, Chronic Alcohol Abuse, Chronic nicotine user (vaping), Paraspinal hernia of back s/p surgery who p/w hypertriglyceridemia (5040) discovered at PCP routine labs: cholesterol in 8000's. NO sxs, denies any abdominal pain, N/V/D or any other complaints. Prior ICU stay [facility] in Feb 2020 for Tg pancreatitis (started on Insulin ggt, DC'd with insulin and Lopid {Gemfibrozil}) States he stopped taking meds since Dec 2020-Jan2021 as he ran out of refills x last 2 Mo. In ED: VSS notable for tachycardia 115 afebrile. , Na 125, K 6 (mod hemolysis) AST//138. Tg 5048 Total Chol 372 Trop neg x1. ICU was consulted on admission but refused by Dr. Frank as patient was asymptomatic at that time. Endocrine Dr. Pina was consulted - started on Lantus 20 with ss Insulin. K cocktail given (10U insulin, D50, Lokelma 15). EKG without peaked T waves. Started on aggressive IVF hydration. Placed on NPO. Monitoring labs closely and will re-consult ICU should labs trend up or pancreatitis symptoms arise.    INTERVAL HPI/OVERNIGHT EVENTS: Patient seen this AM, resting comfortably. Denies any complaints of CP, SOB, NV, Back pain. Given Lokelma 15 and 10 U extra insulin with D50. Will repeat labs this evening to eval trend of Tgs and potassium. EKG without peaked T waves.     REVIEW OF SYSTEMS:  CONSTITUTIONAL: No fever, weight loss, or fatigue  RESPIRATORY: No cough, wheezing, chills or hemoptysis; No shortness of breath  CARDIOVASCULAR: No chest pain, palpitations, dizziness, or leg swelling  GASTROINTESTINAL: No abdominal pain. No nausea, vomiting, or hematemesis; No diarrhea or constipation. No melena or hematochezia.  GENITOURINARY: No dysuria or hematuria, urinary frequency  NEUROLOGICAL: No headaches, memory loss, loss of strength, numbness, or tremors  SKIN: No itching, burning, rashes, or lesions     MEDICATIONS  (STANDING):  aspirin enteric coated 81 milliGRAM(s) Oral daily  atorvastatin 80 milliGRAM(s) Oral at bedtime  calcium gluconate IVPB 1 Gram(s) IV Intermittent once  enoxaparin Injectable 40 milliGRAM(s) SubCutaneous daily  gemfibrozil 600 milliGRAM(s) Oral two times a day  hydrochlorothiazide 25 milliGRAM(s) Oral daily  insulin glargine Injectable (LANTUS) 20 Unit(s) SubCutaneous at bedtime  insulin lispro (ADMELOG) corrective regimen sliding scale   SubCutaneous every 4 hours  omega-3-Acid Ethyl Esters 2 Gram(s) Oral two times a day  pantoprazole    Tablet 40 milliGRAM(s) Oral before breakfast  sodium chloride 0.9% lock flush 3 milliLiter(s) IV Push every 8 hours  sodium chloride 0.9%. 1000 milliLiter(s) (125 mL/Hr) IV Continuous <Continuous>    MEDICATIONS  (PRN):      Vital Signs Last 24 Hrs  T(C): 37.4 (04 Mar 2021 14:14), Max: 37.4 (04 Mar 2021 14:14)  T(F): 99.3 (04 Mar 2021 14:14), Max: 99.3 (04 Mar 2021 14:14)  HR: 87 (04 Mar 2021 14:14) (86 - 115)  BP: 137/86 (04 Mar 2021 14:14) (123/83 - 137/86)  BP(mean): --  RR: 18 (04 Mar 2021 14:14) (16 - 18)  SpO2: 97% (04 Mar 2021 14:14) (95% - 97%)    PHYSICAL EXAMINATION:  GENERAL: NAD, obese male, resting  HEAD:  Atraumatic, Normocephalic  EYES:  conjunctiva and sclera clear  NECK: Supple, No JVD, Normal thyroid  CHEST/LUNG: Clear to auscultation. Clear to percussion bilaterally; No rales, rhonchi, wheezing, or rubs  HEART: Regular rate and rhythm; No murmurs, rubs, or gallops  ABDOMEN: Soft, Nontender, Nondistended; Bowel sounds present  NERVOUS SYSTEM:  Alert & Oriented X3,    EXTREMITIES:  2+ Peripheral Pulses, No clubbing, cyanosis, or edema  SKIN: warm dry                          14.0   5.46  )-----------( 256      ( 04 Mar 2021 09:09 )             41.5     03-04    132<L>  |  99  |  x   ----------------------------<  x   x    |  21<L>  |  x     Ca    7.8<L>      04 Mar 2021 17:10  Phos  3.2     03-04  Mg     2.3     03-04    TPro  7.4  /  Alb  3.5  /  TBili  0.9  /  DBili  x   /  AST  161<H>  /  ALT  56  /  AlkPhos  83  03-04    LIVER FUNCTIONS - ( 04 Mar 2021 09:09 )  Alb: 3.5 g/dL / Pro: 7.4 g/dL / ALK PHOS: 83 U/L / ALT: 56 U/L DA / AST: 161 U/L / GGT: x           CARDIAC MARKERS ( 04 Mar 2021 16:16 )  x     / x     / 270 U/L / x     / <1.0 ng/mL  CARDIAC MARKERS ( 03 Mar 2021 23:27 )  <0.015 ng/mL / x     / x     / x     / x          PT/INR - ( 03 Mar 2021 23:27 )   PT: 10.9 sec;   INR: 0.91 ratio         PTT - ( 03 Mar 2021 23:27 )  PTT:37.2 sec        I&O's Summary      CAPILLARY BLOOD GLUCOSE      POCT Blood Glucose.: 161 mg/dL (04 Mar 2021 17:17)    CAPILLARY BLOOD GLUCOSE      POCT Blood Glucose.: 161 mg/dL (04 Mar 2021 17:17)  POCT Blood Glucose.: 282 mg/dL (04 Mar 2021 11:20)  POCT Blood Glucose.: 307 mg/dL (04 Mar 2021 09:44)      RADIOLOGY & ADDITIONAL TESTS:                   PGY-1 Progress Note discussed with attending    PAGER #: [[phone]] TILL 5:00 PM  PLEASE CONTACT ON CALL TEAM:   - On Call Team (Please refer to Cedrick) FROM 5:00 PM - 8:30PM  - Nightfloat Team FROM 8:30 -7:30 AM    CHIEF COMPLAINT & BRIEF HOSPITAL COURSE:  39 yo M, home, ambulatory w/ HTN, HLD, DM, h/o pancreatitis, h/o TG elevation, Chronic Alcohol Abuse, Chronic nicotine user (vaping), Paraspinal hernia of back s/p surgery who p/w hypertriglyceridemia (5040) discovered at PCP routine labs: cholesterol in 8000's. NO sxs, denies any abdominal pain, N/V/D or any other complaints. Prior ICU stay [facility] in Feb 2020 for Tg pancreatitis (started on Insulin ggt, DC'd with insulin and Lopid {Gemfibrozil}) States he stopped taking meds since Dec 2020-Jan2021 as he ran out of refills x last 2 Mo. In ED: VSS notable for tachycardia 115 afebrile. , Na 125, K 6 (mod hemolysis) AST//138. Tg 5048 Total Chol 372 Trop neg x1. ICU was consulted on admission but refused by Dr. Frank as patient was asymptomatic at that time. Endocrine Dr. Pina was consulted - started on Lantus 20 with ss Insulin. K cocktail given (10U insulin, D50, Lokelma 15). EKG without peaked T waves. Started on aggressive IVF hydration. Placed on NPO. Monitoring labs closely and will re-consult ICU should labs trend up or pancreatitis symptoms arise.    INTERVAL HPI/OVERNIGHT EVENTS: Patient seen this AM, resting comfortably. Denies any complaints of CP, SOB, NV, Back pain. Given Lokelma 15 and 10 U extra insulin with D50. Will repeat labs this evening to eval trend of Tgs and potassium. EKG without peaked T waves.     REVIEW OF SYSTEMS:  CONSTITUTIONAL: No fever, weight loss, or fatigue  RESPIRATORY: No cough, wheezing, chills or hemoptysis; No shortness of breath  CARDIOVASCULAR: No chest pain, palpitations, dizziness, or leg swelling  GASTROINTESTINAL: No abdominal pain. No nausea, vomiting, or hematemesis; No diarrhea or constipation. No melena or hematochezia.  GENITOURINARY: No dysuria or hematuria, urinary frequency  NEUROLOGICAL: No headaches, memory loss, loss of strength, numbness, or tremors  SKIN: No itching, burning, rashes, or lesions     MEDICATIONS  (STANDING):  aspirin enteric coated 81 milliGRAM(s) Oral daily  atorvastatin 80 milliGRAM(s) Oral at bedtime  calcium gluconate IVPB 1 Gram(s) IV Intermittent once  enoxaparin Injectable 40 milliGRAM(s) SubCutaneous daily  gemfibrozil 600 milliGRAM(s) Oral two times a day  hydrochlorothiazide 25 milliGRAM(s) Oral daily  insulin glargine Injectable (LANTUS) 20 Unit(s) SubCutaneous at bedtime  insulin lispro (ADMELOG) corrective regimen sliding scale   SubCutaneous every 4 hours  omega-3-Acid Ethyl Esters 2 Gram(s) Oral two times a day  pantoprazole    Tablet 40 milliGRAM(s) Oral before breakfast  sodium chloride 0.9% lock flush 3 milliLiter(s) IV Push every 8 hours  sodium chloride 0.9%. 1000 milliLiter(s) (125 mL/Hr) IV Continuous <Continuous>    MEDICATIONS  (PRN):      Vital Signs Last 24 Hrs  T(C): 37.4 (04 Mar 2021 14:14), Max: 37.4 (04 Mar 2021 14:14)  T(F): 99.3 (04 Mar 2021 14:14), Max: 99.3 (04 Mar 2021 14:14)  HR: 87 (04 Mar 2021 14:14) (86 - 115)  BP: 137/86 (04 Mar 2021 14:14) (123/83 - 137/86)  BP(mean): --  RR: 18 (04 Mar 2021 14:14) (16 - 18)  SpO2: 97% (04 Mar 2021 14:14) (95% - 97%)    PHYSICAL EXAMINATION:  GENERAL: NAD, obese male, resting  HEAD:  Atraumatic, Normocephalic  EYES:  conjunctiva and sclera clear  NECK: Supple, No JVD, Normal thyroid  CHEST/LUNG: Clear to auscultation. Clear to percussion bilaterally; No rales, rhonchi, wheezing, or rubs  HEART: Regular rate and rhythm; No murmurs, rubs, or gallops  ABDOMEN: Soft, Nontender, Nondistended; Bowel sounds present  NERVOUS SYSTEM:  Alert & Oriented X3,    EXTREMITIES:  2+ Peripheral Pulses, No clubbing, cyanosis, or edema  SKIN: warm dry                          14.0   5.46  )-----------( 256      ( 04 Mar 2021 09:09 )             41.5     03-04    132<L>  |  99  |  x   ----------------------------<  x   x    |  21<L>  |  x     Ca    7.8<L>      04 Mar 2021 17:10  Phos  3.2     03-04  Mg     2.3     03-04    TPro  7.4  /  Alb  3.5  /  TBili  0.9  /  DBili  x   /  AST  161<H>  /  ALT  56  /  AlkPhos  83  03-04    LIVER FUNCTIONS - ( 04 Mar 2021 09:09 )  Alb: 3.5 g/dL / Pro: 7.4 g/dL / ALK PHOS: 83 U/L / ALT: 56 U/L DA / AST: 161 U/L / GGT: x           CARDIAC MARKERS ( 04 Mar 2021 16:16 )  x     / x     / 270 U/L / x     / <1.0 ng/mL  CARDIAC MARKERS ( 03 Mar 2021 23:27 )  <0.015 ng/mL / x     / x     / x     / x          PT/INR - ( 03 Mar 2021 23:27 )   PT: 10.9 sec;   INR: 0.91 ratio    PTT - ( 03 Mar 2021 23:27 )  PTT:37.2 sec      I&O's Summary      CAPILLARY BLOOD GLUCOSE  POCT Blood Glucose.: 161 mg/dL (04 Mar 2021 17:17)    CAPILLARY BLOOD GLUCOSE  POCT Blood Glucose.: 161 mg/dL (04 Mar 2021 17:17)  POCT Blood Glucose.: 282 mg/dL (04 Mar 2021 11:20)  POCT Blood Glucose.: 307 mg/dL (04 Mar 2021 09:44)    RADIOLOGY & ADDITIONAL TESTS:

## 2021-03-05 DIAGNOSIS — K85.90 ACUTE PANCREATITIS WITHOUT NECROSIS OR INFECTION, UNSPECIFIED: ICD-10-CM

## 2021-03-05 LAB
ALBUMIN SERPL ELPH-MCNC: 3.5 G/DL — SIGNIFICANT CHANGE UP (ref 3.5–5)
ALP SERPL-CCNC: 94 U/L — SIGNIFICANT CHANGE UP (ref 40–120)
ALT FLD-CCNC: 58 U/L DA — SIGNIFICANT CHANGE UP (ref 10–60)
ANION GAP SERPL CALC-SCNC: 11 MMOL/L — SIGNIFICANT CHANGE UP (ref 5–17)
ANION GAP SERPL CALC-SCNC: 7 MMOL/L — SIGNIFICANT CHANGE UP (ref 5–17)
ANION GAP SERPL CALC-SCNC: 9 MMOL/L — SIGNIFICANT CHANGE UP (ref 5–17)
AST SERPL-CCNC: 83 U/L — HIGH (ref 10–40)
BILIRUB SERPL-MCNC: 0.8 MG/DL — SIGNIFICANT CHANGE UP (ref 0.2–1.2)
BUN SERPL-MCNC: 10 MG/DL — SIGNIFICANT CHANGE UP (ref 7–18)
BUN SERPL-MCNC: 14 MG/DL — SIGNIFICANT CHANGE UP (ref 7–18)
BUN SERPL-MCNC: 8 MG/DL — SIGNIFICANT CHANGE UP (ref 7–18)
CALCIUM SERPL-MCNC: 8 MG/DL — LOW (ref 8.4–10.5)
CALCIUM SERPL-MCNC: 8.1 MG/DL — LOW (ref 8.4–10.5)
CALCIUM SERPL-MCNC: 8.6 MG/DL — SIGNIFICANT CHANGE UP (ref 8.4–10.5)
CHLORIDE SERPL-SCNC: 101 MMOL/L — SIGNIFICANT CHANGE UP (ref 96–108)
CHLORIDE SERPL-SCNC: 102 MMOL/L — SIGNIFICANT CHANGE UP (ref 96–108)
CHLORIDE SERPL-SCNC: 104 MMOL/L — SIGNIFICANT CHANGE UP (ref 96–108)
CHOLEST SERPL-MCNC: 393 MG/DL — HIGH
CK SERPL-CCNC: 172 U/L — SIGNIFICANT CHANGE UP (ref 35–232)
CO2 SERPL-SCNC: 23 MMOL/L — SIGNIFICANT CHANGE UP (ref 22–31)
CO2 SERPL-SCNC: 24 MMOL/L — SIGNIFICANT CHANGE UP (ref 22–31)
CO2 SERPL-SCNC: 25 MMOL/L — SIGNIFICANT CHANGE UP (ref 22–31)
CREAT SERPL-MCNC: 0.81 MG/DL — SIGNIFICANT CHANGE UP (ref 0.5–1.3)
CREAT SERPL-MCNC: 0.83 MG/DL — SIGNIFICANT CHANGE UP (ref 0.5–1.3)
CREAT SERPL-MCNC: 0.93 MG/DL — SIGNIFICANT CHANGE UP (ref 0.5–1.3)
GLUCOSE BLDC GLUCOMTR-MCNC: 188 MG/DL — HIGH (ref 70–99)
GLUCOSE BLDC GLUCOMTR-MCNC: 195 MG/DL — HIGH (ref 70–99)
GLUCOSE BLDC GLUCOMTR-MCNC: 200 MG/DL — HIGH (ref 70–99)
GLUCOSE SERPL-MCNC: 157 MG/DL — HIGH (ref 70–99)
GLUCOSE SERPL-MCNC: 212 MG/DL — HIGH (ref 70–99)
GLUCOSE SERPL-MCNC: 230 MG/DL — HIGH (ref 70–99)
HCT VFR BLD CALC: 45.1 % — SIGNIFICANT CHANGE UP (ref 39–50)
HDLC SERPL-MCNC: 34 MG/DL — LOW
HGB BLD-MCNC: 15.9 G/DL — SIGNIFICANT CHANGE UP (ref 13–17)
LDH SERPL L TO P-CCNC: 286 U/L — HIGH (ref 120–225)
LIDOCAIN IGE QN: 1901 U/L — HIGH (ref 73–393)
LIPID PNL WITH DIRECT LDL SERPL: SIGNIFICANT CHANGE UP MG/DL
MAGNESIUM SERPL-MCNC: 1.6 MG/DL — SIGNIFICANT CHANGE UP (ref 1.6–2.6)
MAGNESIUM SERPL-MCNC: 1.7 MG/DL — SIGNIFICANT CHANGE UP (ref 1.6–2.6)
MAGNESIUM SERPL-MCNC: 1.8 MG/DL — SIGNIFICANT CHANGE UP (ref 1.6–2.6)
MCHC RBC-ENTMCNC: 31.5 PG — SIGNIFICANT CHANGE UP (ref 27–34)
MCHC RBC-ENTMCNC: 35.3 GM/DL — SIGNIFICANT CHANGE UP (ref 32–36)
MCV RBC AUTO: 89.5 FL — SIGNIFICANT CHANGE UP (ref 80–100)
NON HDL CHOLESTEROL: 359 MG/DL — HIGH
NRBC # BLD: 0 /100 WBCS — SIGNIFICANT CHANGE UP (ref 0–0)
PHOSPHATE SERPL-MCNC: 2.2 MG/DL — LOW (ref 2.5–4.5)
PHOSPHATE SERPL-MCNC: 2.9 MG/DL — SIGNIFICANT CHANGE UP (ref 2.5–4.5)
PHOSPHATE SERPL-MCNC: 3.1 MG/DL — SIGNIFICANT CHANGE UP (ref 2.5–4.5)
PLATELET # BLD AUTO: 208 K/UL — SIGNIFICANT CHANGE UP (ref 150–400)
POTASSIUM SERPL-MCNC: 3.2 MMOL/L — LOW (ref 3.5–5.3)
POTASSIUM SERPL-MCNC: 3.4 MMOL/L — LOW (ref 3.5–5.3)
POTASSIUM SERPL-MCNC: 3.6 MMOL/L — SIGNIFICANT CHANGE UP (ref 3.5–5.3)
POTASSIUM SERPL-SCNC: 3.2 MMOL/L — LOW (ref 3.5–5.3)
POTASSIUM SERPL-SCNC: 3.4 MMOL/L — LOW (ref 3.5–5.3)
POTASSIUM SERPL-SCNC: 3.6 MMOL/L — SIGNIFICANT CHANGE UP (ref 3.5–5.3)
PROT SERPL-MCNC: 7.1 G/DL — SIGNIFICANT CHANGE UP (ref 6–8.3)
RBC # BLD: 5.04 M/UL — SIGNIFICANT CHANGE UP (ref 4.2–5.8)
RBC # FLD: 12.8 % — SIGNIFICANT CHANGE UP (ref 10.3–14.5)
SODIUM SERPL-SCNC: 135 MMOL/L — SIGNIFICANT CHANGE UP (ref 135–145)
SODIUM SERPL-SCNC: 135 MMOL/L — SIGNIFICANT CHANGE UP (ref 135–145)
SODIUM SERPL-SCNC: 136 MMOL/L — SIGNIFICANT CHANGE UP (ref 135–145)
TRIGL SERPL-MCNC: 2395 MG/DL — HIGH
TRIGL SERPL-MCNC: 3013 MG/DL — HIGH
WBC # BLD: 6.04 K/UL — SIGNIFICANT CHANGE UP (ref 3.8–10.5)
WBC # FLD AUTO: 6.04 K/UL — SIGNIFICANT CHANGE UP (ref 3.8–10.5)

## 2021-03-05 PROCEDURE — 99233 SBSQ HOSP IP/OBS HIGH 50: CPT | Mod: GC

## 2021-03-05 PROCEDURE — 74177 CT ABD & PELVIS W/CONTRAST: CPT | Mod: 26

## 2021-03-05 RX ORDER — ACETAMINOPHEN 500 MG
650 TABLET ORAL EVERY 6 HOURS
Refills: 0 | Status: DISCONTINUED | OUTPATIENT
Start: 2021-03-05 | End: 2021-03-08

## 2021-03-05 RX ORDER — PANTOPRAZOLE SODIUM 20 MG/1
40 TABLET, DELAYED RELEASE ORAL DAILY
Refills: 0 | Status: DISCONTINUED | OUTPATIENT
Start: 2021-03-05 | End: 2021-03-08

## 2021-03-05 RX ORDER — ACETAMINOPHEN 500 MG
1000 TABLET ORAL ONCE
Refills: 0 | Status: COMPLETED | OUTPATIENT
Start: 2021-03-05 | End: 2021-03-05

## 2021-03-05 RX ORDER — INSULIN GLARGINE 100 [IU]/ML
10 INJECTION, SOLUTION SUBCUTANEOUS ONCE
Refills: 0 | Status: COMPLETED | OUTPATIENT
Start: 2021-03-05 | End: 2021-03-05

## 2021-03-05 RX ORDER — SODIUM CHLORIDE 9 MG/ML
1000 INJECTION INTRAMUSCULAR; INTRAVENOUS; SUBCUTANEOUS ONCE
Refills: 0 | Status: COMPLETED | OUTPATIENT
Start: 2021-03-05 | End: 2021-03-05

## 2021-03-05 RX ORDER — INSULIN HUMAN 100 [IU]/ML
10 INJECTION, SOLUTION SUBCUTANEOUS
Qty: 100 | Refills: 0 | Status: DISCONTINUED | OUTPATIENT
Start: 2021-03-05 | End: 2021-03-06

## 2021-03-05 RX ORDER — PANTOPRAZOLE SODIUM 20 MG/1
40 TABLET, DELAYED RELEASE ORAL ONCE
Refills: 0 | Status: COMPLETED | OUTPATIENT
Start: 2021-03-05 | End: 2021-03-05

## 2021-03-05 RX ORDER — PANTOPRAZOLE SODIUM 20 MG/1
40 TABLET, DELAYED RELEASE ORAL ONCE
Refills: 0 | Status: DISCONTINUED | OUTPATIENT
Start: 2021-03-05 | End: 2021-03-05

## 2021-03-05 RX ORDER — SODIUM CHLORIDE 9 MG/ML
1000 INJECTION, SOLUTION INTRAVENOUS
Refills: 0 | Status: DISCONTINUED | OUTPATIENT
Start: 2021-03-05 | End: 2021-03-05

## 2021-03-05 RX ORDER — MORPHINE SULFATE 50 MG/1
2 CAPSULE, EXTENDED RELEASE ORAL EVERY 6 HOURS
Refills: 0 | Status: DISCONTINUED | OUTPATIENT
Start: 2021-03-05 | End: 2021-03-07

## 2021-03-05 RX ORDER — POTASSIUM CHLORIDE 20 MEQ
10 PACKET (EA) ORAL
Refills: 0 | Status: COMPLETED | OUTPATIENT
Start: 2021-03-05 | End: 2021-03-06

## 2021-03-05 RX ORDER — ONDANSETRON 8 MG/1
4 TABLET, FILM COATED ORAL EVERY 8 HOURS
Refills: 0 | Status: DISCONTINUED | OUTPATIENT
Start: 2021-03-05 | End: 2021-03-08

## 2021-03-05 RX ORDER — CHLORHEXIDINE GLUCONATE 213 G/1000ML
1 SOLUTION TOPICAL DAILY
Refills: 0 | Status: DISCONTINUED | OUTPATIENT
Start: 2021-03-05 | End: 2021-03-07

## 2021-03-05 RX ORDER — SODIUM CHLORIDE 9 MG/ML
1000 INJECTION, SOLUTION INTRAVENOUS ONCE
Refills: 0 | Status: COMPLETED | OUTPATIENT
Start: 2021-03-05 | End: 2021-03-05

## 2021-03-05 RX ORDER — SODIUM CHLORIDE 9 MG/ML
1000 INJECTION, SOLUTION INTRAVENOUS
Refills: 0 | Status: DISCONTINUED | OUTPATIENT
Start: 2021-03-05 | End: 2021-03-08

## 2021-03-05 RX ORDER — POTASSIUM PHOSPHATE, MONOBASIC POTASSIUM PHOSPHATE, DIBASIC 236; 224 MG/ML; MG/ML
15 INJECTION, SOLUTION INTRAVENOUS ONCE
Refills: 0 | Status: COMPLETED | OUTPATIENT
Start: 2021-03-05 | End: 2021-03-05

## 2021-03-05 RX ORDER — POTASSIUM CHLORIDE 20 MEQ
10 PACKET (EA) ORAL
Refills: 0 | Status: COMPLETED | OUTPATIENT
Start: 2021-03-05 | End: 2021-03-05

## 2021-03-05 RX ADMIN — ENOXAPARIN SODIUM 40 MILLIGRAM(S): 100 INJECTION SUBCUTANEOUS at 11:28

## 2021-03-05 RX ADMIN — SODIUM CHLORIDE 150 MILLILITER(S): 9 INJECTION INTRAMUSCULAR; INTRAVENOUS; SUBCUTANEOUS at 05:57

## 2021-03-05 RX ADMIN — Medication 600 MILLIGRAM(S): at 05:34

## 2021-03-05 RX ADMIN — PANTOPRAZOLE SODIUM 40 MILLIGRAM(S): 20 TABLET, DELAYED RELEASE ORAL at 11:28

## 2021-03-05 RX ADMIN — Medication 650 MILLIGRAM(S): at 17:51

## 2021-03-05 RX ADMIN — Medication 100 MILLIEQUIVALENT(S): at 22:05

## 2021-03-05 RX ADMIN — CHLORHEXIDINE GLUCONATE 1 APPLICATION(S): 213 SOLUTION TOPICAL at 15:02

## 2021-03-05 RX ADMIN — INSULIN HUMAN 10 UNIT(S)/HR: 100 INJECTION, SOLUTION SUBCUTANEOUS at 23:44

## 2021-03-05 RX ADMIN — Medication 2 GRAM(S): at 05:40

## 2021-03-05 RX ADMIN — Medication 100 MILLIEQUIVALENT(S): at 16:18

## 2021-03-05 RX ADMIN — Medication 81 MILLIGRAM(S): at 11:28

## 2021-03-05 RX ADMIN — Medication 25 MILLIGRAM(S): at 05:39

## 2021-03-05 RX ADMIN — POTASSIUM PHOSPHATE, MONOBASIC POTASSIUM PHOSPHATE, DIBASIC 62.5 MILLIMOLE(S): 236; 224 INJECTION, SOLUTION INTRAVENOUS at 17:36

## 2021-03-05 RX ADMIN — Medication 650 MILLIGRAM(S): at 19:40

## 2021-03-05 RX ADMIN — ATORVASTATIN CALCIUM 80 MILLIGRAM(S): 80 TABLET, FILM COATED ORAL at 21:53

## 2021-03-05 RX ADMIN — SODIUM CHLORIDE 150 MILLILITER(S): 9 INJECTION, SOLUTION INTRAVENOUS at 11:27

## 2021-03-05 RX ADMIN — Medication 1000 MILLIGRAM(S): at 06:46

## 2021-03-05 RX ADMIN — SODIUM CHLORIDE 3 MILLILITER(S): 9 INJECTION INTRAMUSCULAR; INTRAVENOUS; SUBCUTANEOUS at 15:03

## 2021-03-05 RX ADMIN — PANTOPRAZOLE SODIUM 40 MILLIGRAM(S): 20 TABLET, DELAYED RELEASE ORAL at 05:34

## 2021-03-05 RX ADMIN — Medication 100 MILLIEQUIVALENT(S): at 17:36

## 2021-03-05 RX ADMIN — Medication 2 GRAM(S): at 19:39

## 2021-03-05 RX ADMIN — MORPHINE SULFATE 2 MILLIGRAM(S): 50 CAPSULE, EXTENDED RELEASE ORAL at 08:28

## 2021-03-05 RX ADMIN — Medication 400 MILLIGRAM(S): at 05:39

## 2021-03-05 RX ADMIN — SODIUM CHLORIDE 1000 MILLILITER(S): 9 INJECTION INTRAMUSCULAR; INTRAVENOUS; SUBCUTANEOUS at 08:29

## 2021-03-05 RX ADMIN — Medication 100 MILLIEQUIVALENT(S): at 23:45

## 2021-03-05 RX ADMIN — Medication 2: at 05:45

## 2021-03-05 RX ADMIN — SODIUM CHLORIDE 150 MILLILITER(S): 9 INJECTION, SOLUTION INTRAVENOUS at 21:53

## 2021-03-05 RX ADMIN — Medication 100 MILLIEQUIVALENT(S): at 19:38

## 2021-03-05 RX ADMIN — INSULIN HUMAN 10 UNIT(S)/HR: 100 INJECTION, SOLUTION SUBCUTANEOUS at 11:27

## 2021-03-05 RX ADMIN — Medication 600 MILLIGRAM(S): at 19:40

## 2021-03-05 RX ADMIN — INSULIN GLARGINE 10 UNIT(S): 100 INJECTION, SOLUTION SUBCUTANEOUS at 07:26

## 2021-03-05 RX ADMIN — PANTOPRAZOLE SODIUM 40 MILLIGRAM(S): 20 TABLET, DELAYED RELEASE ORAL at 07:27

## 2021-03-05 RX ADMIN — SODIUM CHLORIDE 1000 MILLILITER(S): 9 INJECTION, SOLUTION INTRAVENOUS at 11:15

## 2021-03-05 RX ADMIN — SODIUM CHLORIDE 3 MILLILITER(S): 9 INJECTION INTRAMUSCULAR; INTRAVENOUS; SUBCUTANEOUS at 22:06

## 2021-03-05 RX ADMIN — SODIUM CHLORIDE 3 MILLILITER(S): 9 INJECTION INTRAMUSCULAR; INTRAVENOUS; SUBCUTANEOUS at 05:57

## 2021-03-05 NOTE — PROGRESS NOTE ADULT - PROBLEM SELECTOR PLAN 7
IMPROVE VTE Individual Risk Assessment    RISK                                                          Points  [] Previous VTE                                           3  [] Thrombophilia                                        2  [] Lower limb paralysis                              2   [] Current Cancer                                       2   [X] Immobilization > 24 hrs                        1  [] ICU/CCU stay > 24 hours                       1  [] Age > 60                                                   1    IMPROVE VTE Score: LOVENOX  PPI Pt has PMH of HTN   home meds- HCTZ  C/W home meds with parameters  monitor vitals  DASH

## 2021-03-05 NOTE — CONSULT NOTE ADULT - ASSESSMENT
40M, from home, self ambulatory  with a PMH of HTN, HLD, DM, h/o Pancreatitis, h/o TG elevation, Chronic Alcohol Abuse, Chronic nicotine user through vaping, Paraspinal hernia of back s/p surgery who presented with a chief complaint of hypertriglyceridemia. Patient's triglycerides were noted to be in 4000's with no signs of pancreatitis initially and was started on medications and IVF. However, now with pancreatitis.     -Acute pancreatitis   -Hypertriglyceridemia   -Diabetes Mellitus   -Hypertension    Plan:    CNS: No issues     CVS: Hypertension: Patient with hx of HTN, BP stable  off medications     Respiratory: No issues     GI:   -Acute pancreatitis secondary to hypertriglyceridemia and alcohol use:   Patient meets 2/3 criteria with typical abdominal pain and elevated lipase 1901   Triglycerides-3013   CT abdomen: Decreased peripancreatic stranding/fluid with mild residual peripancreatic stranding near the groove, suggesting resolving pancreatitis.  Will give 3 L IVF Bolus   Start on insulin drip given elevated triglycerides   -C/w D5 NS @ 150cc /hr while being on insulin drip   pain control, NPO, Bowel rest     Endo:  Hypertriglyceridemia: C/w insulin drip, Lopid, Lovaza and high intensity atorvastatin   Endo following     DM: Follow A1C, C/w insulin drip till TG <1000    Renal:   CT scan showed: : Question striated bilateral nephrograms. Nonspecific mild bilateral perinephric stranding without hydroureteronephrosis. Non obstructing bilateral renal stones again noted. Bilateral renal scarring.   No urinary symptoms     ID: No issues     PPx: Lovenox , PPI  40M, from home, self ambulatory  with a PMH of HTN, HLD, DM, h/o Pancreatitis, h/o TG elevation, Chronic Alcohol Abuse, Chronic nicotine user through vaping, Paraspinal hernia of back s/p surgery who presented with a chief complaint of hypertriglyceridemia. Patient's triglycerides were noted to be in 4000's with no signs of pancreatitis initially and was started on medications and IVF. However, now with pancreatitis.     -Acute pancreatitis   -Hypertriglyceridemia   -Diabetes Mellitus   -Hypertension  -Chronic Alcoholic     Plan:    CNS: No issues     CVS: Hypertension: Patient with hx of HTN, BP stable  off medications     Respiratory: No issues     GI:   -Acute pancreatitis secondary to hypertriglyceridemia and alcohol use:   Patient meets 2/3 criteria with typical abdominal pain and elevated lipase 1901   Triglycerides-3013   CT abdomen: Decreased peripancreatic stranding/fluid with mild residual peripancreatic stranding near the groove, suggesting resolving pancreatitis.  Will give 3 L IVF Bolus   Start on insulin drip given elevated triglycerides   -C/w D5 NS @ 150cc /hr while being on insulin drip   pain control, NPO, Bowel rest      -Chronic Alcoholic: patient is chronic alcoholic, drinks 4 beers in 2 weeks    no prior hx of withdrawal   CIWA protocol      Endo:  Hypertriglyceridemia: C/w insulin drip, Lopid, Lovaza and high intensity atorvastatin   Endo following     DM: Follow A1C, C/w insulin drip till TG <1000    Renal:   CT scan showed: : Question striated bilateral nephrograms. Nonspecific mild bilateral perinephric stranding without hydroureteronephrosis. Non obstructing bilateral renal stones again noted. Bilateral renal scarring.   No urinary symptoms     ID: No issues     PPx: Lovenox , PPI

## 2021-03-05 NOTE — PROGRESS NOTE ADULT - PROBLEM SELECTOR PLAN 1
Pt was sent by PCP for elevated TG ? reported ? 8000  Triglycerides in ED -  5,048, Lipase= 149  - started Aspirin  - started High-Intensity Statin & Zetia 10mg PO qd  - f/u Creat Kinase and LDH for cardiomypathy risk (per risk benefit patient requires these medications and will closely monitor for cardiac dysfunction)  ICU / MICU CONSULTED : patient asymptomatic, re-eval if Tg elevates or patient gets symptomatic  ENDOCRINE Dr. Pina   - - keep NPO  - - Lopid 600 BID  - - Lipitor 40 QD  - - repeat Tg trending down re-consult MICU for IV Insulin ggt  - - FS q4  - - discussed diet and alcohol cessation  - will need o/p Lipid specialist after discharge  - f/u SW consult  ICU / MICU CONSULTED 3/5 in AM for PANCREATITIS SYMPTOMS + Lipase 1901  - IVF, Insulin drip, pain management 2/2 hyper Tg  Tg trending down  Lipase trended up on 3/5 + Abdominal pain  ICU RE-CONSULTED 3/5  - - Start insulin infusion  - - Monitor serum electrolytes and glucose  - - On statins and fibrates by endocrinology  - - Recommend aggressive IV fluid resuscitation   - - Pain control  - - NPO for now  - - monitor triglyceride level .

## 2021-03-05 NOTE — PROGRESS NOTE ADULT - PROBLEM SELECTOR PLAN 4
Na- 125   -Sodium, Corrected= 135  hyponatremia likely pseudohyponatremia 2/2 TG elevation  F/u BMP Am : 125 > 129 > 132  - trend BMP and monitor electrolytes  - c/w aggressive  cc NS per MICU pt K on admission was 6.2 with mod hemolysis  increased serum lipids (hyper Tg or mixed hypercholesterolemia) can present as milky or strawberry milky (hemolysis inducing)  - amount of hemolysis appears to increase as the lipid concentration increases; a sort of PSEUDO hyper K due to lipid induced hemolysis  - EKG without changes, no peaked T waves  - repeat K 6.2 with same mod hemolysis  - given 10 U lantus D50 and Lokelma 15g > K improved  - continue to monitor BMP, EKG

## 2021-03-05 NOTE — CHART NOTE - NSCHARTNOTEFT_GEN_A_CORE
At 4:30 AM, I was paged bu RN to order pain medication for the patient. Chart was reviewed. I saw the patient and he was in pain ( 10/10 as per patient ), middle epigastric pain radiates to his back. That pain woke him up. 1gm IV tylenol and 40mg IV protonix was given. CBC, BMP and Lipase were ordered as STAT. At 4:30 AM, I was paged bu RN to order pain medication for the patient. Chart was reviewed. I saw the patient and he was in pain ( 10/10 as per patient ), middle epigastric pain radiates to his back. That pain woke him up. 1gm IV tylenol and 40mg IV protonix was given. CBC, BMP and Lipase were ordered as STAT.      UPDATE: I called the lab to check lipase level. I was told that both machines are down!

## 2021-03-05 NOTE — PROGRESS NOTE ADULT - PROBLEM SELECTOR PLAN 6
Pt has PMH of HTN   home meds- HCTZ  C/W home meds with parameters  monitor vitals  DASH Pt noted to have LFTS elevation    > 161   > 56  AlkP 100 >83  - daily CMP  - trending down  - NPO, IVF continue

## 2021-03-05 NOTE — PROGRESS NOTE ADULT - SUBJECTIVE AND OBJECTIVE BOX
PGY-1 Progress Note discussed with attending    PAGER #: [[phone]] TILL 5:00 PM  PLEASE CONTACT ON CALL TEAM:   - On Call Team (Please refer to Cedrick) FROM 5:00 PM - 8:30PM  - Nightfloat Team FROM 8:30 -7:30 AM    CHIEF COMPLAINT & BRIEF HOSPITAL COURSE:      INTERVAL HPI/OVERNIGHT EVENTS:       REVIEW OF SYSTEMS:  CONSTITUTIONAL: No fever, weight loss, or fatigue  RESPIRATORY: No cough, wheezing, chills or hemoptysis; No shortness of breath  CARDIOVASCULAR: No chest pain, palpitations, dizziness, or leg swelling  GASTROINTESTINAL: No abdominal pain. No nausea, vomiting, or hematemesis; No diarrhea or constipation. No melena or hematochezia.  GENITOURINARY: No dysuria or hematuria, urinary frequency  NEUROLOGICAL: No headaches, memory loss, loss of strength, numbness, or tremors  SKIN: No itching, burning, rashes, or lesions     MEDICATIONS  (STANDING):  aspirin enteric coated 81 milliGRAM(s) Oral daily  atorvastatin 80 milliGRAM(s) Oral at bedtime  chlorhexidine 4% Liquid 1 Application(s) Topical daily  dextrose 5% + sodium chloride 0.9% 1000 milliLiter(s) (150 mL/Hr) IV Continuous <Continuous>  enoxaparin Injectable 40 milliGRAM(s) SubCutaneous daily  gemfibrozil 600 milliGRAM(s) Oral two times a day  hydrochlorothiazide 25 milliGRAM(s) Oral daily  insulin regular Infusion 10 Unit(s)/Hr (10 mL/Hr) IV Continuous <Continuous>  omega-3-Acid Ethyl Esters 2 Gram(s) Oral two times a day  pantoprazole  Injectable 40 milliGRAM(s) IV Push daily  potassium chloride  10 mEq/100 mL IVPB 10 milliEquivalent(s) IV Intermittent every 1 hour  potassium phosphate IVPB 15 milliMole(s) IV Intermittent once  sodium chloride 0.9% lock flush 3 milliLiter(s) IV Push every 8 hours    MEDICATIONS  (PRN):  acetaminophen   Tablet .. 650 milliGRAM(s) Oral every 6 hours PRN Mild Pain (1 - 3)  morphine  - Injectable 2 milliGRAM(s) IV Push every 6 hours PRN Severe Pain (7 - 10)  ondansetron Injectable 4 milliGRAM(s) IV Push every 8 hours PRN Nausea and/or Vomiting      Vital Signs Last 24 Hrs  T(C): 36.7 (05 Mar 2021 05:22), Max: 36.7 (04 Mar 2021 20:41)  T(F): 98 (05 Mar 2021 05:22), Max: 98 (04 Mar 2021 20:41)  HR: 97 (05 Mar 2021 16:10) (76 - 97)  BP: 119/70 (05 Mar 2021 15:30) (115/73 - 139/88)  BP(mean): 82 (05 Mar 2021 15:30) (82 - 102)  RR: 27 (05 Mar 2021 16:10) (15 - 27)  SpO2: 97% (05 Mar 2021 16:10) (95% - 100%)    PHYSICAL EXAMINATION:  GENERAL: NAD, well built  HEAD:  Atraumatic, Normocephalic  EYES:  conjunctiva and sclera clear  NECK: Supple, No JVD, Normal thyroid  CHEST/LUNG: Clear to auscultation. Clear to percussion bilaterally; No rales, rhonchi, wheezing, or rubs  HEART: Regular rate and rhythm; No murmurs, rubs, or gallops  ABDOMEN: Soft, Nontender, Nondistended; Bowel sounds present  NERVOUS SYSTEM:  Alert & Oriented X3,    EXTREMITIES:  2+ Peripheral Pulses, No clubbing, cyanosis, or edema  SKIN: warm dry                          15.9   6.04  )-----------( 208      ( 05 Mar 2021 05:11 )             45.1     03-05    135  |  101  |  10  ----------------------------<  212<H>  3.2<L>   |  23  |  0.83    Ca    8.1<L>      05 Mar 2021 14:54  Phos  2.2     03-05  Mg     1.7     03-05    TPro  7.1  /  Alb  3.5  /  TBili  0.8  /  DBili  x   /  AST  83<H>  /  ALT  58  /  AlkPhos  94  03-05    LIVER FUNCTIONS - ( 05 Mar 2021 05:12 )  Alb: 3.5 g/dL / Pro: 7.1 g/dL / ALK PHOS: 94 U/L / ALT: 58 U/L DA / AST: 83 U/L / GGT: x           CARDIAC MARKERS ( 05 Mar 2021 05:12 )  x     / x     / 172 U/L / x     / x      CARDIAC MARKERS ( 04 Mar 2021 16:16 )  x     / x     / 270 U/L / x     / <1.0 ng/mL  CARDIAC MARKERS ( 03 Mar 2021 23:27 )  <0.015 ng/mL / x     / x     / x     / x          PT/INR - ( 03 Mar 2021 23:27 )   PT: 10.9 sec;   INR: 0.91 ratio         PTT - ( 03 Mar 2021 23:27 )  PTT:37.2 sec        I&O's Summary    04 Mar 2021 07:01  -  05 Mar 2021 07:00  --------------------------------------------------------  IN: 0 mL / OUT: 1 mL / NET: -1 mL        CAPILLARY BLOOD GLUCOSE      POCT Blood Glucose.: 182 mg/dL (05 Mar 2021 14:50)    CAPILLARY BLOOD GLUCOSE      POCT Blood Glucose.: 182 mg/dL (05 Mar 2021 14:50)  POCT Blood Glucose.: 203 mg/dL (05 Mar 2021 13:10)  POCT Blood Glucose.: 202 mg/dL (05 Mar 2021 11:40)  POCT Blood Glucose.: 195 mg/dL (05 Mar 2021 07:49)  POCT Blood Glucose.: 200 mg/dL (05 Mar 2021 05:43)  POCT Blood Glucose.: 188 mg/dL (05 Mar 2021 04:33)  POCT Blood Glucose.: 189 mg/dL (04 Mar 2021 23:43)  POCT Blood Glucose.: 155 mg/dL (04 Mar 2021 21:13)  POCT Blood Glucose.: 161 mg/dL (04 Mar 2021 17:17)      RADIOLOGY & ADDITIONAL TESTS:                   PGY-1 Progress Note discussed with attending    PAGER #: [[phone]] TILL 5:00 PM  PLEASE CONTACT ON CALL TEAM:   - On Call Team (Please refer to Cedrick) FROM 5:00 PM - 8:30PM  - Nightfloat Team FROM 8:30 -7:30 AM    CHIEF COMPLAINT & BRIEF HOSPITAL COURSE:  39 yo M, home, ambulatory w/ HTN, HLD, DM, h/o pancreatitis, h/o TG elevation, Chronic Alcohol Abuse, Chronic nicotine user (vaping), Paraspinal hernia of back s/p surgery who p/w hypertriglyceridemia (5040) discovered at PCP routine labs: cholesterol in 8000's. NO sxs, denies any abdominal pain, N/V/D or any other complaints. Prior ICU stay [facility] in Feb 2020 for Tg pancreatitis (started on Insulin ggt, DC'd with insulin and Lopid {Gemfibrozil}) States he stopped taking meds since Dec 2020-Jan2021 as he ran out of refills x last 2 Mo. In ED: VSS notable for tachycardia 115 afebrile. , Na 125, K 6 (mod hemolysis) AST//138. Tg 5048 Total Chol 372 Trop neg x1. ICU was consulted on admission but refused by Dr. Frank as patient was asymptomatic at that time. Endocrine Dr. Pina was consulted - started on Lantus 20 with ss Insulin. K cocktail given (10U insulin, D50, Lokelma 15). EKG without peaked T waves. Started on aggressive IVF hydration. Placed on NPO. Monitoring labs closely and will re-consult ICU should labs trend up or pancreatitis symptoms arise.    3/5 Patients Tg trending down, patient began to have abdominal pain in AM. CT abdomen pelvis shows resolving appendicitis. ICU consulted for symptoms of pancreatitis and new elevation in lipase 1901.    INTERVAL HPI/OVERNIGHT EVENTS: Patient seen this AM, reporting abdominal pain has not improved with IV tylenol. States the pain is above his umbilicus and 7/10 in severity. ICU consulted and accepted. Started on pain control and IVF.         REVIEW OF SYSTEMS:  CONSTITUTIONAL: No fever, weight loss, or fatigue  RESPIRATORY: No cough, wheezing, chills or hemoptysis; No shortness of breath  CARDIOVASCULAR: No chest pain, palpitations, dizziness, or leg swelling  GASTROINTESTINAL: +abdominal pain. No nausea, vomiting, or hematemesis; No diarrhea or constipation. No melena or hematochezia.  GENITOURINARY: No dysuria or hematuria, urinary frequency  NEUROLOGICAL: No headaches, memory loss, loss of strength, numbness, or tremors  SKIN: No itching, burning, rashes, or lesions     MEDICATIONS  (STANDING):  aspirin enteric coated 81 milliGRAM(s) Oral daily  atorvastatin 80 milliGRAM(s) Oral at bedtime  chlorhexidine 4% Liquid 1 Application(s) Topical daily  dextrose 5% + sodium chloride 0.9% 1000 milliLiter(s) (150 mL/Hr) IV Continuous <Continuous>  enoxaparin Injectable 40 milliGRAM(s) SubCutaneous daily  gemfibrozil 600 milliGRAM(s) Oral two times a day  hydrochlorothiazide 25 milliGRAM(s) Oral daily  insulin regular Infusion 10 Unit(s)/Hr (10 mL/Hr) IV Continuous <Continuous>  omega-3-Acid Ethyl Esters 2 Gram(s) Oral two times a day  pantoprazole  Injectable 40 milliGRAM(s) IV Push daily  potassium chloride  10 mEq/100 mL IVPB 10 milliEquivalent(s) IV Intermittent every 1 hour  potassium phosphate IVPB 15 milliMole(s) IV Intermittent once  sodium chloride 0.9% lock flush 3 milliLiter(s) IV Push every 8 hours    MEDICATIONS  (PRN):  acetaminophen   Tablet .. 650 milliGRAM(s) Oral every 6 hours PRN Mild Pain (1 - 3)  morphine  - Injectable 2 milliGRAM(s) IV Push every 6 hours PRN Severe Pain (7 - 10)  ondansetron Injectable 4 milliGRAM(s) IV Push every 8 hours PRN Nausea and/or Vomiting      Vital Signs Last 24 Hrs  T(C): 36.7 (05 Mar 2021 05:22), Max: 36.7 (04 Mar 2021 20:41)  T(F): 98 (05 Mar 2021 05:22), Max: 98 (04 Mar 2021 20:41)  HR: 97 (05 Mar 2021 16:10) (76 - 97)  BP: 119/70 (05 Mar 2021 15:30) (115/73 - 139/88)  BP(mean): 82 (05 Mar 2021 15:30) (82 - 102)  Lipase, Serum in AM (03.05.21 @ 05:12)   Lipase, Serum: 1901 U/L RR: 27 (05 Mar 2021 16:10) (15 - 27)  SpO2: 97% (05 Mar 2021 16:10) (95% - 100%)    PHYSICAL EXAMINATION:    GENERAL: Mild distress 2/2 pain, laying flat, obese male  HEAD:  Atraumatic, Normocephalic  EYES:  conjunctiva and sclera clear  NECK: Supple, No JVD  CHEST/LUNG: Clear to auscultation. ; No rales, rhonchi, wheezing, or rubs  HEART: Regular rate and rhythm; No murmurs, rubs, or gallops  ABDOMEN: Soft, Tender to palpation in epigastrium above umbilicus, Nondistended, No guarding or rebound; Bowel sounds present  NERVOUS SYSTEM:  Alert & Oriented X3,    EXTREMITIES:  2+ Peripheral Pulses, No clubbing, cyanosis, or edema  SKIN: warm dry      Lipase, Serum in AM (03.05.21 @ 05:12)   Lipase, Serum: 1901 U/L     Lipid Profile in AM (03.05.21 @ 05:12)   Cholesterol, Serum: 393 mg/dL   Triglycerides, Serum: 3013 mg/dL   HDL Cholesterol, Serum: 34 mg/dL   Non HDL Cholesterol: 359: Patient’s Atherosclerotic Cardiovascular Disease (ASCVD) Risk                       15.9   6.04  )-----------( 208      ( 05 Mar 2021 05:11 )             45.1     03-05    135  |  101  |  10  ----------------------------<  212<H>  3.2<L>   |  23  |  0.83    Ca    8.1<L>      05 Mar 2021 14:54  Phos  2.2     03-05  Mg     1.7     03-05    TPro  7.1  /  Alb  3.5  /  TBili  0.8  /  DBili  x   /  AST  83<H>  /  ALT  58  /  AlkPhos  94  03-05    LIVER FUNCTIONS - ( 05 Mar 2021 05:12 )  Alb: 3.5 g/dL / Pro: 7.1 g/dL / ALK PHOS: 94 U/L / ALT: 58 U/L DA / AST: 83 U/L / GGT: x           CARDIAC MARKERS ( 05 Mar 2021 05:12 )  x     / x     / 172 U/L / x     / x      CARDIAC MARKERS ( 04 Mar 2021 16:16 )  x     / x     / 270 U/L / x     / <1.0 ng/mL  CARDIAC MARKERS ( 03 Mar 2021 23:27 )  <0.015 ng/mL / x     / x     / x     / x          PT/INR - ( 03 Mar 2021 23:27 )   PT: 10.9 sec;   INR: 0.91 ratio         PTT - ( 03 Mar 2021 23:27 )  PTT:37.2 sec        I&O's Summary    04 Mar 2021 07:01  -  05 Mar 2021 07:00  --------------------------------------------------------  IN: 0 mL / OUT: 1 mL / NET: -1 mL        CAPILLARY BLOOD GLUCOSE      POCT Blood Glucose.: 182 mg/dL (05 Mar 2021 14:50)    CAPILLARY BLOOD GLUCOSE      POCT Blood Glucose.: 182 mg/dL (05 Mar 2021 14:50)  POCT Blood Glucose.: 203 mg/dL (05 Mar 2021 13:10)  POCT Blood Glucose.: 202 mg/dL (05 Mar 2021 11:40)  POCT Blood Glucose.: 195 mg/dL (05 Mar 2021 07:49)  POCT Blood Glucose.: 200 mg/dL (05 Mar 2021 05:43)  POCT Blood Glucose.: 188 mg/dL (05 Mar 2021 04:33)  POCT Blood Glucose.: 189 mg/dL (04 Mar 2021 23:43)  POCT Blood Glucose.: 155 mg/dL (04 Mar 2021 21:13)  POCT Blood Glucose.: 161 mg/dL (04 Mar 2021 17:17)      RADIOLOGY & ADDITIONAL TESTS:

## 2021-03-05 NOTE — PROGRESS NOTE ADULT - PROBLEM SELECTOR PLAN 5
Pt noted to have LFTS elevation    > 161   > 56  AlkP 100 >83  - daily CMP  - trending down  - NPO, IVF continue Na- 125   -Sodium, Corrected= 135  hyponatremia likely pseudohyponatremia 2/2 TG elevation  F/u BMP Am : 125 > 129 > 132  - trend BMP and monitor electrolytes  - c/w aggressive  cc NS per MICU

## 2021-03-05 NOTE — PROGRESS NOTE ADULT - PROBLEM SELECTOR PLAN 2
Pt has PMH of DM, home regimen- Basaglar 20U evening and Humalog 12U TID with issues of non compliance 2/2 insurance cost issues  - SW consulted  ZAY Pina  - - Patient with hx of Diabetes.  - - FS was > 300  - - A1c result pending  - - will keep the Patient NPO for Hypertriglyceridemia.  - - Lantus 20 and moderate sliding scale q 4.  - - re evaluate regimen once patient is on diet.  ICU / MICU CONSULTED 3/5 in AM for PANCREATITIS SYMPTOMS + Lipase 1901  - IVF, Insulin drip, pain management Pt was sent by PCP for elevated TG ? reported ? 8000  Triglycerides in ED -  5,048, Lipase= 149  - started Aspirin  - started High-Intensity Statin & Zetia 10mg PO qd  - f/u Creat Kinase and LDH for cardiomypathy risk (per risk benefit patient requires these medications and will closely monitor for cardiac dysfunction)  ICU / MICU CONSULTED : patient asymptomatic, re-eval if Tg elevates or patient gets symptomatic  ENDOCRINE Dr. Pina   - - keep NPO  - - Lopid 600 BID  - - Lipitor 40 QD  - - repeat Tg trending down re-consult MICU for IV Insulin ggt  - - FS q4  ICU CONSULTED  - - discussed diet and alcohol cessation  - will need o/p Lipid specialist after discharge  - f/u SW consult  ICU / MICU CONSULTED 3/5 in AM for PANCREATITIS SYMPTOMS + Lipase 1901  - IVF, Insulin drip, pain management

## 2021-03-05 NOTE — PROGRESS NOTE ADULT - PROBLEM SELECTOR PLAN 3
pt K on admission was 6.2 with mod hemolysis  increased serum lipids (hyper Tg or mixed hypercholesterolemia) can present as milky or strawberry milky (hemolysis inducing)  - amount of hemolysis appears to increase as the lipid concentration increases; a sort of PSEUDO hyper K due to lipid induced hemolysis  - EKG without changes, no peaked T waves  - repeat K 6.2 with same mod hemolysis  - given 10 U lantus D50 and Lokelma 15g  - continue to monitor BMP, EKG  - low threshold for ICU Pt has PMH of DM, home regimen- Basaglar 20U evening and Humalog 12U TID with issues of non compliance 2/2 insurance cost issues  - SW consulted  ZAY Pina  - - Patient with hx of Diabetes.  - - FS was > 300  - - A1c result pending  - - will keep the Patient NPO for Hypertriglyceridemia.  - - Lantus 20 and moderate sliding scale q 4.  - - re evaluate regimen once patient is on diet.  ICU / MICU CONSULTED 3/5 in AM for PANCREATITIS SYMPTOMS + Lipase 1901  - IVF, Insulin drip, pain management

## 2021-03-05 NOTE — CONSULT NOTE ADULT - ATTENDING COMMENTS
Assessment:  1. Hypertriglyceridemia   2. Diabetes mellitus   3. Class 1 obesity   4. Hypertension   5. Hyperkalemia   6. Acute pancreatitis     Plan  - This morning endorsing epigastric pain, with increasing lipase level. Suspicious for acute pancreatitis.   - Start insulin infusion  - Monitor serum electrolytes and glucose  - On statins and fibrates by endocrinology  - Recommend aggressive IV fluid resuscitation   - Pain control  - NPO for now  - monitor triglyceride level

## 2021-03-05 NOTE — CONSULT NOTE ADULT - SUBJECTIVE AND OBJECTIVE BOX
Patient is a 40y old  Male who presents with a chief complaint of hypertriglyceridemia (04 Mar 2021 17:31)      HPI:  40M, from home, self ambulatory  with a PMH of HTN, HLD, DM, h/o Pancreatitis, h/o TG elevation, Chronic Alcohol Abuse, Chronic nicotine user through vaping, Paraspinal hernia of back s/p surgery who presented with a chief complaint of hypertriglyceridemia.  Patient states that he was seen by his PCP for his routine appointment and his blood work was s/o  cholesterol level in 8000s and was recommended to visit the ED. Pt is asymptomatic and denies any abdominal pain, N/V/D or any other complaints.   Pt was admitted at [facility] In Feb ICU, for Acute panreatitis and elevated TG. Pt was started on Insulin drip on prior admission and was sent home with insulin and Lopid. Pt mentions that during around Dec2020- Jan2021, he ran out of his refills for insulin and lopid and did not have medications for almost 2 months.   Pt mentions taking vape, takes 4-5 beers/2weeks and denies any substance abuse.   FHX- not significant     Brief Hospital Course: In the ED, Patient was awake alert, VS were stable. He was admitted for hypertriglyceridemia. He was started on Lopid and high intensity statin. Patient was seen by endocrinologist, started on Lovaza and kept NPO. He was seen by ICU, patient had no signs of pancreatitis on 3/4 and was started on IVF. However, overnight patient developed abdominal pain. He continues to have mid epigastric pain radiating to the back, no nausea or vomiting. Lipase now elevated to 1900's and CT scan showed: Decreased peripancreatic stranding/fluid with mild residual peripancreatic stranding near the groove, suggesting resolving pancreatitis.         Allergies:     penicillin (Rash)    Intolerances        MEDICATIONS  (STANDING):  aspirin enteric coated 81 milliGRAM(s) Oral daily  atorvastatin 80 milliGRAM(s) Oral at bedtime  dextrose 5% + sodium chloride 0.9%. 1000 milliLiter(s) (150 mL/Hr) IV Continuous <Continuous>  enoxaparin Injectable 40 milliGRAM(s) SubCutaneous daily  gemfibrozil 600 milliGRAM(s) Oral two times a day  hydrochlorothiazide 25 milliGRAM(s) Oral daily  insulin regular Infusion 10 Unit(s)/Hr (10 mL/Hr) IV Continuous <Continuous>  omega-3-Acid Ethyl Esters 2 Gram(s) Oral two times a day  pantoprazole  Injectable 40 milliGRAM(s) IV Push daily  sodium chloride 0.9% lock flush 3 milliLiter(s) IV Push every 8 hours    MEDICATIONS  (PRN):  morphine  - Injectable 2 milliGRAM(s) IV Push every 6 hours PRN Severe Pain (7 - 10)      Daily     Daily     Drug Dosing Weight  Height (cm): 170.2 (04 Mar 2021 06:56)  Weight (kg): 100 (04 Mar 2021 06:56)  BMI (kg/m2): 34.5 (04 Mar 2021 06:56)  BSA (m2): 2.11 (04 Mar 2021 06:56)    PAST MEDICAL & SURGICAL HISTORY:  ETOH abuse    Diabetes    History of hypertension    LBP radiating to right leg    S/P lumbar discectomy  2005        FAMILY HISTORY:  No pertinent family history      REVIEW OF SYSTEMS:    CONSTITUTIONAL: No fever, weight loss, or fatigue  EYES: No eye pain, visual disturbances, or discharge  ENMT:  No difficulty hearing, tinnitus, vertigo; No sinus or throat pain  NECK: No pain or stiffness  BREASTS: No pain, masses, or nipple discharge  RESPIRATORY: No cough, wheezing, chills or hemoptysis; No shortness of breath  CARDIOVASCULAR: No chest pain, palpitations, dizziness, or leg swelling  GASTROINTESTINAL: epigastric pain radiating to back   GENITOURINARY: No dysuria, frequency, hematuria, or incontinence  NEUROLOGICAL: No headaches, memory loss, loss of strength, numbness, or tremors  SKIN: No itching, burning, rashes, or lesions   LYMPH NODES: No enlarged glands  ENDOCRINE: No heat or cold intolerance; No hair loss  MUSCULOSKELETAL: No joint pain or swelling; No muscle, back, or extremity pain  PSYCHIATRIC: No depression, anxiety, mood swings, or difficulty sleeping  HEME/LYMPH: No easy bruising, or bleeding gums  ALLERGY AND IMMUNOLOGIC: No hives or eczema          ICU Vital Signs Last 24 Hrs  T(C): 36.7 (05 Mar 2021 05:22), Max: 37.4 (04 Mar 2021 14:14)  T(F): 98 (05 Mar 2021 05:22), Max: 99.3 (04 Mar 2021 14:14)  HR: 83 (05 Mar 2021 05:22) (76 - 87)  BP: 122/85 (05 Mar 2021 05:22) (122/85 - 137/86)  BP(mean): --  ABP: --  ABP(mean): --  RR: 18 (05 Mar 2021 05:22) (18 - 18)  SpO2: 100% (05 Mar 2021 05:22) (97% - 100%)          I&O's Detail    04 Mar 2021 07:01  -  05 Mar 2021 07:00  --------------------------------------------------------  IN:  Total IN: 0 mL    OUT:    Voided (mL): 1 mL  Total OUT: 1 mL    Total NET: -1 mL          PHYSICAL EXAM:    GENERAL: NAD, well-groomed, well-developed  HEAD:  Atraumatic, Normocephalic  EYES: EOMI, PERRLA, conjunctiva and sclera clear  ENMT: No tonsillar erythema, exudates, or enlargement; Moist mucous membranes, Good dentition, No lesions  NECK: Supple, No JVD, Normal thyroid  NERVOUS SYSTEM:  Alert & Oriented X3, Good concentration; Motor Strength 5/5 B/L upper and lower extremities; DTRs 2+ intact and symmetric  CHEST/LUNG: Clear to percussion bilaterally; No rales, rhonchi, wheezing, or rubs  HEART: Regular rate and rhythm; No murmurs, rubs, or gallops  ABDOMEN: mid epigastric tenderness.   EXTREMITIES:  2+ Peripheral Pulses, No clubbing, cyanosis, or edema  SKIN: No rashes or lesions    LABS:  CBC Full  -  ( 05 Mar 2021 05:11 )  WBC Count : 6.04 K/uL  RBC Count : 5.04 M/uL  Hemoglobin : 15.9 g/dL  Hematocrit : 45.1 %  Platelet Count - Automated : 208 K/uL  Mean Cell Volume : 89.5 fl  Mean Cell Hemoglobin : 31.5 pg  Mean Cell Hemoglobin Concentration : 35.3 gm/dL  Auto Neutrophil # : x  Auto Lymphocyte # : x  Auto Monocyte # : x  Auto Eosinophil # : x  Auto Basophil # : x  Auto Neutrophil % : x  Auto Lymphocyte % : x  Auto Monocyte % : x  Auto Eosinophil % : x  Auto Basophil % : x    03-05    135  |  102  |  14  ----------------------------<  230<H>  3.6   |  24  |  0.93    Ca    8.6      05 Mar 2021 05:12  Phos  2.9     03-05  Mg     1.8     03-05    TPro  7.1  /  Alb  3.5  /  TBili  0.8  /  DBili  x   /  AST  83<H>  /  ALT  58  /  AlkPhos  94  03-05    CAPILLARY BLOOD GLUCOSE      POCT Blood Glucose.: 195 mg/dL (05 Mar 2021 07:49)    PT/INR - ( 03 Mar 2021 23:27 )   PT: 10.9 sec;   INR: 0.91 ratio         PTT - ( 03 Mar 2021 23:27 )  PTT:37.2 sec    CARDIAC MARKERS ( 05 Mar 2021 05:12 )  x     / x     / 172 U/L / x     / x      CARDIAC MARKERS ( 04 Mar 2021 16:16 )  x     / x     / 270 U/L / x     / <1.0 ng/mL  CARDIAC MARKERS ( 03 Mar 2021 23:27 )  <0.015 ng/mL / x     / x     / x     / x              RADIOLOGY:  < from: CT Abdomen and Pelvis w/ IV Cont (03.05.21 @ 05:05) >  IMPRESSION:    Decreased peripancreatic stranding/fluid with mildresidual peripancreatic stranding near the groove, suggesting resolving pancreatitis. Recommend correlation with lipase to assess recurrent pancreatitis. No organized fluid collection.    Prominent gastric wall, which may be due to partial distention. Recommend clinical correlation to assess gastritis. Follow-up endoscopy may be obtained as indicated.    Question striated bilateral nephrograms. Recommend clinical correlation to assess urinary tract infection/pyelonephritis.    Additional findingsas described.      < end of copied text >      CRITICAL CARE TIME SPENT: 55 minutes

## 2021-03-05 NOTE — PROGRESS NOTE ADULT - SUBJECTIVE AND OBJECTIVE BOX
Interval Events:      Allergies    penicillin (Rash)    Intolerances      Endocrine/Metabolic Medications:  atorvastatin 80 milliGRAM(s) Oral at bedtime  gemfibrozil 600 milliGRAM(s) Oral two times a day  insulin regular Infusion 10 Unit(s)/Hr IV Continuous <Continuous>      Vital Signs Last 24 Hrs  T(C): 36.7 (05 Mar 2021 05:22), Max: 36.7 (04 Mar 2021 20:41)  T(F): 98 (05 Mar 2021 05:22), Max: 98 (04 Mar 2021 20:41)  HR: 84 (05 Mar 2021 13:00) (76 - 87)  BP: 132/81 (05 Mar 2021 13:00) (122/85 - 139/88)  BP(mean): 93 (05 Mar 2021 13:00) (93 - 102)  RR: 15 (05 Mar 2021 13:00) (15 - 26)  SpO2: 97% (05 Mar 2021 13:00) (97% - 100%)      PHYSICAL EXAM  All physical exam findings normal, except those marked:  General:	Alert, active, cooperative, NAD, well hydrated  .		[] Abnormal:  Neck		Normal: supple, no cervical adenopathy, no palpable thyroid  .		[] Abnormal:  Cardiovascular	Normal: regular rate, normal S1, S2, no murmurs  .		[] Abnormal:  Respiratory	Normal: no chest wall deformity, normal respiratory pattern, CTA B/L  .		[] Abnormal:  Abdominal	Normal: soft, ND, NT, bowel sounds present, no masses, no organomegaly  .		[] Abnormal:  		Normal normal genitalia, testes descended, circumcised/uncircumcised  .		Bina stage:			Breast bina:  .		Menstrual history:  .		[] Abnormal:  Extremities	Normal: FROM x4  .		[] Abnormal:  Skin		Normal: intact and not indurated, no rash, no acanthosis nigricans  .		[] Abnormal:  Neurologic	Normal: grossly intact  .		[] Abnormal:    LABS                        15.9   6.04  )-----------( 208      ( 05 Mar 2021 05:11 )             45.1                               135    |  102    |  14                  Calcium: 8.6   / iCa: x      (03-05 @ 05:12)    ----------------------------<  230       Magnesium: 1.8                              3.6     |  24     |  0.93             Phosphorous: 2.9      TPro  7.1    /  Alb  3.5    /  TBili  0.8    /  DBili  x      /  AST  83     /  ALT  58     /  AlkPhos  94     05 Mar 2021 05:12    CAPILLARY BLOOD GLUCOSE      POCT Blood Glucose.: 182 mg/dL (05 Mar 2021 14:50)  POCT Blood Glucose.: 203 mg/dL (05 Mar 2021 13:10)  POCT Blood Glucose.: 202 mg/dL (05 Mar 2021 11:40)  POCT Blood Glucose.: 195 mg/dL (05 Mar 2021 07:49)  POCT Blood Glucose.: 200 mg/dL (05 Mar 2021 05:43)  POCT Blood Glucose.: 188 mg/dL (05 Mar 2021 04:33)  POCT Blood Glucose.: 189 mg/dL (04 Mar 2021 23:43)  POCT Blood Glucose.: 155 mg/dL (04 Mar 2021 21:13)  POCT Blood Glucose.: 161 mg/dL (04 Mar 2021 17:17)        Assesment/plan       Interval Events:  pt tx to icu due to pancreatitis    Allergies    penicillin (Rash)    Intolerances      Endocrine/Metabolic Medications:  atorvastatin 80 milliGRAM(s) Oral at bedtime  gemfibrozil 600 milliGRAM(s) Oral two times a day  insulin regular Infusion 10 Unit(s)/Hr IV Continuous <Continuous>      Vital Signs Last 24 Hrs  T(C): 36.7 (05 Mar 2021 05:22), Max: 36.7 (04 Mar 2021 20:41)  T(F): 98 (05 Mar 2021 05:22), Max: 98 (04 Mar 2021 20:41)  HR: 84 (05 Mar 2021 13:00) (76 - 87)  BP: 132/81 (05 Mar 2021 13:00) (122/85 - 139/88)  BP(mean): 93 (05 Mar 2021 13:00) (93 - 102)  RR: 15 (05 Mar 2021 13:00) (15 - 26)  SpO2: 97% (05 Mar 2021 13:00) (97% - 100%)      PHYSICAL EXAM  All physical exam findings normal, except those marked:  General:	Alert, active, cooperative, NAD, well hydrated  .		[] Abnormal:  Neck		Normal: supple, no cervical adenopathy, no palpable thyroid  .		[] Abnormal:  Cardiovascular	Normal: regular rate, normal S1, S2, no murmurs  .		[] Abnormal:  Respiratory	Normal: no chest wall deformity, normal respiratory pattern, CTA B/L  .		[] Abnormal:  Abdominal	Normal: soft, ND, NT, bowel sounds present, no masses, no organomegaly  .		[] Abnormal:  		Normal normal genitalia, testes descended, circumcised/uncircumcised  .		Bina stage:			Breast bina:  .		Menstrual history:  .		[] Abnormal:  Extremities	Normal: FROM x4  .		[] Abnormal:  Skin		Normal: intact and not indurated, no rash, no acanthosis nigricans  .		[] Abnormal:  Neurologic	Normal: grossly intact  .		[] Abnormal:    LABS                        15.9   6.04  )-----------( 208      ( 05 Mar 2021 05:11 )             45.1                               135    |  102    |  14                  Calcium: 8.6   / iCa: x      (03-05 @ 05:12)    ----------------------------<  230       Magnesium: 1.8                              3.6     |  24     |  0.93             Phosphorous: 2.9      TPro  7.1    /  Alb  3.5    /  TBili  0.8    /  DBili  x      /  AST  83     /  ALT  58     /  AlkPhos  94     05 Mar 2021 05:12    Triglycerides, Serum (03.05.21 @ 14:54)    Triglycerides, Serum: 2395 mg/dL        CAPILLARY BLOOD GLUCOSE      POCT Blood Glucose.: 182 mg/dL (05 Mar 2021 14:50)  POCT Blood Glucose.: 203 mg/dL (05 Mar 2021 13:10)  POCT Blood Glucose.: 202 mg/dL (05 Mar 2021 11:40)  POCT Blood Glucose.: 195 mg/dL (05 Mar 2021 07:49)  POCT Blood Glucose.: 200 mg/dL (05 Mar 2021 05:43)  POCT Blood Glucose.: 188 mg/dL (05 Mar 2021 04:33)  POCT Blood Glucose.: 189 mg/dL (04 Mar 2021 23:43)  POCT Blood Glucose.: 155 mg/dL (04 Mar 2021 21:13)  POCT Blood Glucose.: 161 mg/dL (04 Mar 2021 17:17)        Assesment/plan    40 year old male  from home, self ambulatory  with a PMH of HTN, HLD, DM, h/o Pancreatitis, h/o TG elevation, Chronic Alcohol Abuse, Chronic nicotine user through vaping, Paraspinal hernia of back s/p surgery who presented with a chief complaint of hypertriglyceridemia.      Hypertriglyceridemia:      Patient presented with triglycerides of 5000.   now with pancreatitis  cont iv insulin drip- tg improving  npo  -cont Lopid 600 bid and Lovaza 2 gm bid.  -cont Lipitor 80  -monitor TG levels and FSG  -Patient counseled on Alcohol cessation and low carb low fat diet  nutrition eval      Uncontrolled Diabetes Mellitus:     Patient with hx of Diabetes.  poorly controlled as out pt  A1c- 11.9  cont iv insulin drip  basal bolus insulin when stable   d/w icu team

## 2021-03-06 LAB
ALBUMIN SERPL ELPH-MCNC: 3.1 G/DL — LOW (ref 3.5–5)
ALP SERPL-CCNC: 84 U/L — SIGNIFICANT CHANGE UP (ref 40–120)
ALT FLD-CCNC: 35 U/L DA — SIGNIFICANT CHANGE UP (ref 10–60)
ANION GAP SERPL CALC-SCNC: 8 MMOL/L — SIGNIFICANT CHANGE UP (ref 5–17)
ANION GAP SERPL CALC-SCNC: 8 MMOL/L — SIGNIFICANT CHANGE UP (ref 5–17)
ANION GAP SERPL CALC-SCNC: 9 MMOL/L — SIGNIFICANT CHANGE UP (ref 5–17)
AST SERPL-CCNC: 28 U/L — SIGNIFICANT CHANGE UP (ref 10–40)
BASOPHILS # BLD AUTO: 0.05 K/UL — SIGNIFICANT CHANGE UP (ref 0–0.2)
BASOPHILS NFR BLD AUTO: 0.5 % — SIGNIFICANT CHANGE UP (ref 0–2)
BILIRUB SERPL-MCNC: 0.6 MG/DL — SIGNIFICANT CHANGE UP (ref 0.2–1.2)
BUN SERPL-MCNC: 4 MG/DL — LOW (ref 7–18)
BUN SERPL-MCNC: 4 MG/DL — LOW (ref 7–18)
BUN SERPL-MCNC: 5 MG/DL — LOW (ref 7–18)
CALCIUM SERPL-MCNC: 8.4 MG/DL — SIGNIFICANT CHANGE UP (ref 8.4–10.5)
CALCIUM SERPL-MCNC: 8.5 MG/DL — SIGNIFICANT CHANGE UP (ref 8.4–10.5)
CALCIUM SERPL-MCNC: 8.5 MG/DL — SIGNIFICANT CHANGE UP (ref 8.4–10.5)
CHLORIDE SERPL-SCNC: 107 MMOL/L — SIGNIFICANT CHANGE UP (ref 96–108)
CHLORIDE SERPL-SCNC: 107 MMOL/L — SIGNIFICANT CHANGE UP (ref 96–108)
CHLORIDE SERPL-SCNC: 110 MMOL/L — HIGH (ref 96–108)
CO2 SERPL-SCNC: 22 MMOL/L — SIGNIFICANT CHANGE UP (ref 22–31)
CO2 SERPL-SCNC: 22 MMOL/L — SIGNIFICANT CHANGE UP (ref 22–31)
CO2 SERPL-SCNC: 23 MMOL/L — SIGNIFICANT CHANGE UP (ref 22–31)
CREAT SERPL-MCNC: 0.73 MG/DL — SIGNIFICANT CHANGE UP (ref 0.5–1.3)
CREAT SERPL-MCNC: 0.8 MG/DL — SIGNIFICANT CHANGE UP (ref 0.5–1.3)
CREAT SERPL-MCNC: 0.82 MG/DL — SIGNIFICANT CHANGE UP (ref 0.5–1.3)
EOSINOPHIL # BLD AUTO: 0.12 K/UL — SIGNIFICANT CHANGE UP (ref 0–0.5)
EOSINOPHIL NFR BLD AUTO: 1.2 % — SIGNIFICANT CHANGE UP (ref 0–6)
GLUCOSE SERPL-MCNC: 108 MG/DL — HIGH (ref 70–99)
GLUCOSE SERPL-MCNC: 90 MG/DL — SIGNIFICANT CHANGE UP (ref 70–99)
GLUCOSE SERPL-MCNC: 97 MG/DL — SIGNIFICANT CHANGE UP (ref 70–99)
HCT VFR BLD CALC: 43.9 % — SIGNIFICANT CHANGE UP (ref 39–50)
HGB BLD-MCNC: 14.9 G/DL — SIGNIFICANT CHANGE UP (ref 13–17)
IMM GRANULOCYTES NFR BLD AUTO: 0.6 % — SIGNIFICANT CHANGE UP (ref 0–1.5)
LIDOCAIN IGE QN: 367 U/L — SIGNIFICANT CHANGE UP (ref 73–393)
LYMPHOCYTES # BLD AUTO: 1.22 K/UL — SIGNIFICANT CHANGE UP (ref 1–3.3)
LYMPHOCYTES # BLD AUTO: 12.3 % — LOW (ref 13–44)
MAGNESIUM SERPL-MCNC: 1.8 MG/DL — SIGNIFICANT CHANGE UP (ref 1.6–2.6)
MCHC RBC-ENTMCNC: 30.5 PG — SIGNIFICANT CHANGE UP (ref 27–34)
MCHC RBC-ENTMCNC: 33.9 GM/DL — SIGNIFICANT CHANGE UP (ref 32–36)
MCV RBC AUTO: 89.8 FL — SIGNIFICANT CHANGE UP (ref 80–100)
MONOCYTES # BLD AUTO: 0.69 K/UL — SIGNIFICANT CHANGE UP (ref 0–0.9)
MONOCYTES NFR BLD AUTO: 7 % — SIGNIFICANT CHANGE UP (ref 2–14)
NEUTROPHILS # BLD AUTO: 7.78 K/UL — HIGH (ref 1.8–7.4)
NEUTROPHILS NFR BLD AUTO: 78.4 % — HIGH (ref 43–77)
NRBC # BLD: 0 /100 WBCS — SIGNIFICANT CHANGE UP (ref 0–0)
PHOSPHATE SERPL-MCNC: 2.6 MG/DL — SIGNIFICANT CHANGE UP (ref 2.5–4.5)
PLATELET # BLD AUTO: 197 K/UL — SIGNIFICANT CHANGE UP (ref 150–400)
POTASSIUM SERPL-MCNC: 3.4 MMOL/L — LOW (ref 3.5–5.3)
POTASSIUM SERPL-MCNC: 3.5 MMOL/L — SIGNIFICANT CHANGE UP (ref 3.5–5.3)
POTASSIUM SERPL-MCNC: 3.9 MMOL/L — SIGNIFICANT CHANGE UP (ref 3.5–5.3)
POTASSIUM SERPL-SCNC: 3.4 MMOL/L — LOW (ref 3.5–5.3)
POTASSIUM SERPL-SCNC: 3.5 MMOL/L — SIGNIFICANT CHANGE UP (ref 3.5–5.3)
POTASSIUM SERPL-SCNC: 3.9 MMOL/L — SIGNIFICANT CHANGE UP (ref 3.5–5.3)
PROT SERPL-MCNC: 6.5 G/DL — SIGNIFICANT CHANGE UP (ref 6–8.3)
RBC # BLD: 4.89 M/UL — SIGNIFICANT CHANGE UP (ref 4.2–5.8)
RBC # FLD: 12.8 % — SIGNIFICANT CHANGE UP (ref 10.3–14.5)
SODIUM SERPL-SCNC: 137 MMOL/L — SIGNIFICANT CHANGE UP (ref 135–145)
SODIUM SERPL-SCNC: 138 MMOL/L — SIGNIFICANT CHANGE UP (ref 135–145)
SODIUM SERPL-SCNC: 141 MMOL/L — SIGNIFICANT CHANGE UP (ref 135–145)
TRIGL SERPL-MCNC: 1460 MG/DL — HIGH
TRIGL SERPL-MCNC: 878 MG/DL — HIGH
WBC # BLD: 9.92 K/UL — SIGNIFICANT CHANGE UP (ref 3.8–10.5)
WBC # FLD AUTO: 9.92 K/UL — SIGNIFICANT CHANGE UP (ref 3.8–10.5)

## 2021-03-06 RX ORDER — INSULIN LISPRO 100/ML
VIAL (ML) SUBCUTANEOUS EVERY 4 HOURS
Refills: 0 | Status: DISCONTINUED | OUTPATIENT
Start: 2021-03-06 | End: 2021-03-07

## 2021-03-06 RX ORDER — INSULIN GLARGINE 100 [IU]/ML
40 INJECTION, SOLUTION SUBCUTANEOUS AT BEDTIME
Refills: 0 | Status: DISCONTINUED | OUTPATIENT
Start: 2021-03-06 | End: 2021-03-08

## 2021-03-06 RX ORDER — DEXTROSE 10 % IN WATER 10 %
1000 INTRAVENOUS SOLUTION INTRAVENOUS
Refills: 0 | Status: DISCONTINUED | OUTPATIENT
Start: 2021-03-06 | End: 2021-03-06

## 2021-03-06 RX ORDER — DEXTROSE 50 % IN WATER 50 %
50 SYRINGE (ML) INTRAVENOUS ONCE
Refills: 0 | Status: COMPLETED | OUTPATIENT
Start: 2021-03-06 | End: 2021-03-06

## 2021-03-06 RX ORDER — POTASSIUM CHLORIDE 20 MEQ
10 PACKET (EA) ORAL
Refills: 0 | Status: COMPLETED | OUTPATIENT
Start: 2021-03-06 | End: 2021-03-06

## 2021-03-06 RX ADMIN — Medication 650 MILLIGRAM(S): at 13:15

## 2021-03-06 RX ADMIN — Medication 81 MILLIGRAM(S): at 13:01

## 2021-03-06 RX ADMIN — Medication 2 GRAM(S): at 18:16

## 2021-03-06 RX ADMIN — Medication 100 MILLIEQUIVALENT(S): at 18:16

## 2021-03-06 RX ADMIN — Medication 600 MILLIGRAM(S): at 06:12

## 2021-03-06 RX ADMIN — SODIUM CHLORIDE 3 MILLILITER(S): 9 INJECTION INTRAMUSCULAR; INTRAVENOUS; SUBCUTANEOUS at 05:07

## 2021-03-06 RX ADMIN — Medication 650 MILLIGRAM(S): at 13:01

## 2021-03-06 RX ADMIN — Medication 100 MILLIEQUIVALENT(S): at 20:08

## 2021-03-06 RX ADMIN — MORPHINE SULFATE 2 MILLIGRAM(S): 50 CAPSULE, EXTENDED RELEASE ORAL at 20:59

## 2021-03-06 RX ADMIN — CHLORHEXIDINE GLUCONATE 1 APPLICATION(S): 213 SOLUTION TOPICAL at 13:03

## 2021-03-06 RX ADMIN — Medication 50 MILLILITER(S): at 09:23

## 2021-03-06 RX ADMIN — ATORVASTATIN CALCIUM 80 MILLIGRAM(S): 80 TABLET, FILM COATED ORAL at 21:36

## 2021-03-06 RX ADMIN — Medication 50 MILLILITER(S): at 18:35

## 2021-03-06 RX ADMIN — SODIUM CHLORIDE 3 MILLILITER(S): 9 INJECTION INTRAMUSCULAR; INTRAVENOUS; SUBCUTANEOUS at 13:22

## 2021-03-06 RX ADMIN — MORPHINE SULFATE 2 MILLIGRAM(S): 50 CAPSULE, EXTENDED RELEASE ORAL at 20:21

## 2021-03-06 RX ADMIN — Medication 2 GRAM(S): at 06:12

## 2021-03-06 RX ADMIN — PANTOPRAZOLE SODIUM 40 MILLIGRAM(S): 20 TABLET, DELAYED RELEASE ORAL at 13:01

## 2021-03-06 RX ADMIN — SODIUM CHLORIDE 3 MILLILITER(S): 9 INJECTION INTRAMUSCULAR; INTRAVENOUS; SUBCUTANEOUS at 21:37

## 2021-03-06 RX ADMIN — Medication 100 MILLIEQUIVALENT(S): at 20:58

## 2021-03-06 RX ADMIN — ENOXAPARIN SODIUM 40 MILLIGRAM(S): 100 INJECTION SUBCUTANEOUS at 13:01

## 2021-03-06 RX ADMIN — SODIUM CHLORIDE 150 MILLILITER(S): 9 INJECTION, SOLUTION INTRAVENOUS at 05:08

## 2021-03-06 RX ADMIN — Medication 600 MILLIGRAM(S): at 18:16

## 2021-03-06 RX ADMIN — Medication 100 MILLIEQUIVALENT(S): at 00:44

## 2021-03-06 RX ADMIN — INSULIN GLARGINE 40 UNIT(S): 100 INJECTION, SOLUTION SUBCUTANEOUS at 18:50

## 2021-03-06 NOTE — PROGRESS NOTE ADULT - PROBLEM SELECTOR PLAN 3
Pt has PMH of DM, home regimen- Basaglar 20U evening and Humalog 12U TID with issues of non compliance 2/2 insurance cost issues  - SW consulted  ZAY Pina  - - Patient with hx of Diabetes.  - - FS was > 300  - - A1c result pending  - - will keep the Patient NPO for Hypertriglyceridemia.  - - Lantus 20 and moderate sliding scale q 4.  - - re evaluate regimen once patient is on diet.  ICU / MICU CONSULTED 3/5 in AM for PANCREATITIS SYMPTOMS + Lipase 1901  - IVF, Insulin drip, pain management

## 2021-03-06 NOTE — PROGRESS NOTE ADULT - PROBLEM SELECTOR PLAN 4
pt K on admission was 6.2 with mod hemolysis  increased serum lipids (hyper Tg or mixed hypercholesterolemia) can present as milky or strawberry milky (hemolysis inducing)  - amount of hemolysis appears to increase as the lipid concentration increases; a sort of PSEUDO hyper K due to lipid induced hemolysis  - EKG without changes, no peaked T waves  - repeat K 6.2 with same mod hemolysis  - given 10 U lantus D50 and Lokelma 15g > K improved  - continue to monitor BMP, EKG

## 2021-03-06 NOTE — PROGRESS NOTE ADULT - PROBLEM SELECTOR PLAN 6
Pt noted to have LFTS elevation    > 161   > 56  AlkP 100 >83  - daily CMP  - trending down  - NPO, IVF continue

## 2021-03-06 NOTE — PROGRESS NOTE ADULT - SUBJECTIVE AND OBJECTIVE BOX
Interval Events:  pt in nad  feels better     Allergies    penicillin (Rash)    Intolerances      Endocrine/Metabolic Medications:  atorvastatin 80 milliGRAM(s) Oral at bedtime  gemfibrozil 600 milliGRAM(s) Oral two times a day  insulin glargine Injectable (LANTUS) 40 Unit(s) SubCutaneous at bedtime  insulin lispro (ADMELOG) corrective regimen sliding scale   SubCutaneous every 4 hours  insulin regular Infusion 10 Unit(s)/Hr IV Continuous <Continuous>      Vital Signs Last 24 Hrs  T(C): 37.1 (06 Mar 2021 05:56), Max: 37.2 (05 Mar 2021 23:35)  T(F): 98.7 (06 Mar 2021 05:56), Max: 99 (05 Mar 2021 23:35)  HR: 84 (06 Mar 2021 15:00) (77 - 103)  BP: 122/73 (06 Mar 2021 15:00) (103/58 - 144/91)  BP(mean): 85 (06 Mar 2021 15:00) (61 - 112)  RR: 16 (06 Mar 2021 15:00) (8 - 26)  SpO2: 95% (06 Mar 2021 10:00) (92% - 98%)      PHYSICAL EXAM  All physical exam findings normal, except those marked:  General:	Alert, active, cooperative, NAD, well hydrated  .		[] Abnormal:  Neck		Normal: supple, no cervical adenopathy, no palpable thyroid  .		[] Abnormal:  Cardiovascular	Normal: regular rate, normal S1, S2, no murmurs  .		[] Abnormal:  Respiratory	Normal: no chest wall deformity, normal respiratory pattern, CTA B/L  .		[] Abnormal:  Abdominal	Normal: soft, ND, NT, bowel sounds present, no masses, no organomegaly  .		[] Abnormal:  		Normal normal genitalia, testes descended, circumcised/uncircumcised  .		Bina stage:			Breast bina:  .		Menstrual history:  .		[] Abnormal:  Extremities	Normal: FROM x4  .		[] Abnormal:  Skin		Normal: intact and not indurated, no rash, no acanthosis nigricans  .		[] Abnormal:  Neurologic	Normal: grossly intact  .		[] Abnormal:    LABS                        14.9   9.92  )-----------( 197      ( 06 Mar 2021 06:09 )             43.9                               141    |  110    |  4                   Calcium: 8.5   / iCa: x      (03-06 @ 14:47)    ----------------------------<  90        Magnesium: x                                3.4     |  23     |  0.73             Phosphorous: x        TPro  6.5    /  Alb  3.1    /  TBili  0.6    /  DBili  x      /  AST  28     /  ALT  35     /  AlkPhos  84     06 Mar 2021 06:09      Triglycerides, Serum (03.06.21 @ 14:47)    Triglycerides, Serum: 878 mg/dL        CAPILLARY BLOOD GLUCOSE      POCT Blood Glucose.: 72 mg/dL (06 Mar 2021 14:33)  POCT Blood Glucose.: 109 mg/dL (06 Mar 2021 12:56)  POCT Blood Glucose.: 112 mg/dL (06 Mar 2021 11:15)  POCT Blood Glucose.: 94 mg/dL (06 Mar 2021 09:26)  POCT Blood Glucose.: 89 mg/dL (06 Mar 2021 07:38)  POCT Blood Glucose.: 87 mg/dL (06 Mar 2021 06:51)  POCT Blood Glucose.: 94 mg/dL (06 Mar 2021 06:01)  POCT Blood Glucose.: 104 mg/dL (06 Mar 2021 04:58)  POCT Blood Glucose.: 107 mg/dL (06 Mar 2021 02:57)  POCT Blood Glucose.: 113 mg/dL (06 Mar 2021 01:51)  POCT Blood Glucose.: 114 mg/dL (06 Mar 2021 00:45)  POCT Blood Glucose.: 114 mg/dL (05 Mar 2021 23:42)  POCT Blood Glucose.: 118 mg/dL (05 Mar 2021 22:44)  POCT Blood Glucose.: 118 mg/dL (05 Mar 2021 21:34)  POCT Blood Glucose.: 152 mg/dL (05 Mar 2021 20:33)  POCT Blood Glucose.: 137 mg/dL (05 Mar 2021 19:10)  POCT Blood Glucose.: 135 mg/dL (05 Mar 2021 18:20)        Assesment/plan    40 year old male  from home, self ambulatory  with a PMH of HTN, HLD, DM, h/o Pancreatitis, h/o TG elevation, Chronic Alcohol Abuse, Chronic nicotine user through vaping, Paraspinal hernia of back s/p surgery who presented with a chief complaint of hypertriglyceridemia.      Hypertriglyceridemia:      Patient presented with triglycerides of 5000.   now with pancreatitis  on iv insulin drip at 10 units/hr- tg improving  npo  -cont Lopid 600 bid and Lovaza 2 gm bid.  -cont Lipitor 80  -monitor TG levels and FSG  -Patient counseled on Alcohol cessation and low carb low fat diet  nutrition eval        Uncontrolled Diabetes Mellitus:     Patient with hx of Diabetes.  poorly controlled as out pt  A1c- 11.9  start lantus 40 units now   may d/c  iv insulin drip  admelog scale q4 hrs  cont npo  d/w icu team

## 2021-03-06 NOTE — PROGRESS NOTE ADULT - PROBLEM SELECTOR PLAN 2
Pt was sent by PCP for elevated TG ? reported ? 8000  Triglycerides in ED -  5,048, Lipase= 149  - started Aspirin  - started High-Intensity Statin & Zetia 10mg PO qd  - f/u Creat Kinase and LDH for cardiomypathy risk (per risk benefit patient requires these medications and will closely monitor for cardiac dysfunction)  ICU / MICU CONSULTED : patient asymptomatic, re-eval if Tg elevates or patient gets symptomatic  ENDOCRINE Dr. Pina   - - keep NPO  - - Lopid 600 BID  - - Lipitor 40 QD  - - repeat Tg trending down re-consult MICU for IV Insulin ggt  - - FS q4  ICU CONSULTED  - - discussed diet and alcohol cessation  - will need o/p Lipid specialist after discharge  - f/u SW consult  ICU / MICU CONSULTED 3/5 in AM for PANCREATITIS SYMPTOMS + Lipase 1901  - IVF, Insulin drip, pain management

## 2021-03-06 NOTE — PROGRESS NOTE ADULT - PROBLEM SELECTOR PLAN 8
IMPROVE VTE Individual Risk Assessment    RISK                                                          Points  [] Previous VTE                                           3  [] Thrombophilia                                        2  [] Lower limb paralysis                              2   [] Current Cancer                                       2   [X] Immobilization > 24 hrs                        1  [] ICU/CCU stay > 24 hours                       1  [] Age > 60                                                   1    IMPROVE VTE Score: LOVENOX  PPI
IMPROVE VTE Individual Risk Assessment    RISK                                                          Points  [] Previous VTE                                           3  [] Thrombophilia                                        2  [] Lower limb paralysis                              2   [] Current Cancer                                       2   [X] Immobilization > 24 hrs                        1  [] ICU/CCU stay > 24 hours                       1  [] Age > 60                                                   1    IMPROVE VTE Score: LOVENOX  PPI

## 2021-03-06 NOTE — PROGRESS NOTE ADULT - PROBLEM SELECTOR PLAN 1
2/2 hyper Tg  Tg trending down  Lipase trended up on 3/5 + Abdominal pain  ICU RE-CONSULTED 3/5  - - Start insulin infusion  - - Monitor serum electrolytes and glucose  - - On statins and fibrates by endocrinology  - - Recommend aggressive IV fluid resuscitation   - - Pain control  - - NPO for now  - - monitor triglyceride level .

## 2021-03-06 NOTE — PROGRESS NOTE ADULT - SUBJECTIVE AND OBJECTIVE BOX
Patient is a 40y old  Male who presents with a chief complaint of Hypertriglyceridemia (05 Mar 2021 16:47)      INTERVAL HPI/OVERNIGHT EVENTS:potassium was repeated overnight and was 3.4 , replaced, TG down trended to 2027 , insulin drip will be continued.   ICU Vital Signs Last 24 Hrs  T(C): 37.2 (05 Mar 2021 23:35), Max: 37.2 (05 Mar 2021 23:35)  T(F): 99 (05 Mar 2021 23:35), Max: 99 (05 Mar 2021 23:35)  HR: 103 (05 Mar 2021 22:00) (76 - 103)  BP: 135/83 (05 Mar 2021 22:00) (115/73 - 141/89)  BP(mean): 96 (05 Mar 2021 22:00) (82 - 102)  ABP: --  ABP(mean): --  RR: 16 (05 Mar 2021 22:00) (15 - 27)  SpO2: 95% (05 Mar 2021 22:00) (95% - 100%)    I&O's Summary    04 Mar 2021 07:01  -  05 Mar 2021 07:00  --------------------------------------------------------  IN: 0 mL / OUT: 1 mL / NET: -1 mL          LABS:                        15.9   6.04  )-----------( 208      ( 05 Mar 2021 05:11 )             45.1     03-05    136  |  104  |  8   ----------------------------<  157<H>  3.4<L>   |  25  |  0.81    Ca    8.0<L>      05 Mar 2021 20:03  Phos  3.1     03-05  Mg     1.6     03-05    TPro  7.1  /  Alb  3.5  /  TBili  0.8  /  DBili  x   /  AST  83<H>  /  ALT  58  /  AlkPhos  94  03-05        CAPILLARY BLOOD GLUCOSE      POCT Blood Glucose.: 114 mg/dL (05 Mar 2021 23:42)  POCT Blood Glucose.: 118 mg/dL (05 Mar 2021 22:44)  POCT Blood Glucose.: 118 mg/dL (05 Mar 2021 21:34)  POCT Blood Glucose.: 152 mg/dL (05 Mar 2021 20:33)  POCT Blood Glucose.: 137 mg/dL (05 Mar 2021 19:10)  POCT Blood Glucose.: 135 mg/dL (05 Mar 2021 18:20)  POCT Blood Glucose.: 182 mg/dL (05 Mar 2021 14:50)  POCT Blood Glucose.: 203 mg/dL (05 Mar 2021 13:10)  POCT Blood Glucose.: 202 mg/dL (05 Mar 2021 11:40)  POCT Blood Glucose.: 195 mg/dL (05 Mar 2021 07:49)  POCT Blood Glucose.: 200 mg/dL (05 Mar 2021 05:43)  POCT Blood Glucose.: 188 mg/dL (05 Mar 2021 04:33)        RADIOLOGY & ADDITIONAL TESTS:    Consultant(s) Notes Reviewed:  [x ] YES  [ ] NO    MEDICATIONS  (STANDING):  aspirin enteric coated 81 milliGRAM(s) Oral daily  atorvastatin 80 milliGRAM(s) Oral at bedtime  chlorhexidine 4% Liquid 1 Application(s) Topical daily  dextrose 5% + sodium chloride 0.9% 1000 milliLiter(s) (150 mL/Hr) IV Continuous <Continuous>  enoxaparin Injectable 40 milliGRAM(s) SubCutaneous daily  gemfibrozil 600 milliGRAM(s) Oral two times a day  insulin regular Infusion 10 Unit(s)/Hr (10 mL/Hr) IV Continuous <Continuous>  omega-3-Acid Ethyl Esters 2 Gram(s) Oral two times a day  pantoprazole  Injectable 40 milliGRAM(s) IV Push daily  potassium chloride  10 mEq/100 mL IVPB 10 milliEquivalent(s) IV Intermittent every 1 hour  sodium chloride 0.9% lock flush 3 milliLiter(s) IV Push every 8 hours    MEDICATIONS  (PRN):  acetaminophen   Tablet .. 650 milliGRAM(s) Oral every 6 hours PRN Mild Pain (1 - 3)  morphine  - Injectable 2 milliGRAM(s) IV Push every 6 hours PRN Severe Pain (7 - 10)  ondansetron Injectable 4 milliGRAM(s) IV Push every 8 hours PRN Nausea and/or Vomiting      PHYSICAL EXAM:  GENERAL: well built, well nourished  HEAD:  Atraumatic, Normocephalic  EYES: EOMI, PERRLA, conjunctiva and sclera clear  ENT: No tonsillar erythema, exudates, or enlargement; Moist mucous membranes, Good dentition, No lesions  NECK: Supple, No JVD, Normal thyroid, no enlarged nodes  NERVOUS SYSTEM:  Alert & Oriented X3, Good concentration; Motor Strength 5/5 B/L upper and lower extremities; DTRs 2+ intact and symmetric, sensory intact  CHEST/LUNG: B/L good air entry; No rales, rhonchi, or wheezing  HEART: S1S2 normal, no S3, Regular rate and rhythm; No murmurs, rubs, or gallops  ABDOMEN: Soft, Nontender, Nondistended; Bowel sounds present  EXTREMITIES:  2+ Peripheral Pulses, No clubbing, cyanosis, or edema  LYMPH: No lymphadenopathy noted  SKIN: No rashes or lesions    Care Discussed with Consultants/Other Providers [ x] YES  [ ] NO

## 2021-03-06 NOTE — PROGRESS NOTE ADULT - ASSESSMENT
40M, from home, self ambulatory  with a PMH of HTN, HLD, DM, h/o Pancreatitis, h/o TG elevation, Chronic Alcohol Abuse, Chronic nicotine user through vaping, Paraspinal hernia of back s/p surgery who presented with a chief complaint of hypertriglyceridemia.   Pt admitted for High TG levels.  40M, from home, self ambulatory  with a PMH of HTN, HLD, DM, h/o Pancreatitis, h/o TG elevation, Chronic Alcohol Abuse, Chronic nicotine user through vaping, Paraspinal hernia of back s/p surgery who presented with a chief complaint of hypertriglyceridemia. Patient's triglycerides were noted to be in 4000's with no signs of pancreatitis initially and was started on medications and IVF. However, now with pancreatitis.     -Acute pancreatitis   -Hypertriglyceridemia   -Diabetes Mellitus   -Hypertension  -Chronic Alcoholic     Plan:    CNS: No issues     CVS: Hypertension: Patient with hx of HTN, BP stable  off medications     Respiratory: No issues     GI:   -Acute pancreatitis secondary to hypertriglyceridemia and alcohol use:   Patient meets 2/3 criteria with typical abdominal pain and elevated lipase 1901   Triglycerides-3013   CT abdomen: Decreased peripancreatic stranding/fluid with mild residual peripancreatic stranding near the groove, suggesting resolving pancreatitis.  Will give 3 L IVF Bolus   Start on insulin drip given elevated triglycerides   -C/w D5 NS @ 150cc /hr while being on insulin drip   pain control, NPO, Bowel rest      -Chronic Alcoholic: patient is chronic alcoholic, drinks 4 beers in 2 weeks    no prior hx of withdrawal   CIWA protocol      Endo:  Hypertriglyceridemia: C/w insulin drip, Lopid, Lovaza and high intensity atorvastatin   Endo following     DM: Follow A1C, C/w insulin drip till TG <1000    Renal:   CT scan showed: : Question striated bilateral nephrograms. Nonspecific mild bilateral perinephric stranding without hydroureteronephrosis. Non obstructing bilateral renal stones again noted. Bilateral renal scarring.   No urinary symptoms     ID: No issues     PPx: Lovenox , PPI

## 2021-03-06 NOTE — PROGRESS NOTE ADULT - PROBLEM SELECTOR PLAN 5
Na- 125   -Sodium, Corrected= 135  hyponatremia likely pseudohyponatremia 2/2 TG elevation  F/u BMP Am : 125 > 129 > 132  - trend BMP and monitor electrolytes  - c/w aggressive  cc NS per MICU

## 2021-03-07 LAB
ALBUMIN SERPL ELPH-MCNC: 2.6 G/DL — LOW (ref 3.5–5)
ALBUMIN SERPL ELPH-MCNC: 4.5 G/DL
ALP BLD-CCNC: 107 U/L
ALP SERPL-CCNC: 76 U/L — SIGNIFICANT CHANGE UP (ref 40–120)
ALT FLD-CCNC: 25 U/L DA — SIGNIFICANT CHANGE UP (ref 10–60)
ALT SERPL-CCNC: 13 U/L
ANION GAP SERPL CALC-SCNC: 25 MMOL/L
ANION GAP SERPL CALC-SCNC: 6 MMOL/L — SIGNIFICANT CHANGE UP (ref 5–17)
AST SERPL-CCNC: 22 U/L
AST SERPL-CCNC: 25 U/L — SIGNIFICANT CHANGE UP (ref 10–40)
BILIRUB SERPL-MCNC: 0.3 MG/DL
BILIRUB SERPL-MCNC: 0.4 MG/DL — SIGNIFICANT CHANGE UP (ref 0.2–1.2)
BUN SERPL-MCNC: 13 MG/DL
BUN SERPL-MCNC: 2 MG/DL — LOW (ref 7–18)
CALCIUM SERPL-MCNC: 10.1 MG/DL
CALCIUM SERPL-MCNC: 7.8 MG/DL — LOW (ref 8.4–10.5)
CHLORIDE SERPL-SCNC: 114 MMOL/L — HIGH (ref 96–108)
CHLORIDE SERPL-SCNC: 92 MMOL/L
CO2 SERPL-SCNC: 18 MMOL/L
CO2 SERPL-SCNC: 22 MMOL/L — SIGNIFICANT CHANGE UP (ref 22–31)
CREAT SERPL-MCNC: 0.84 MG/DL — SIGNIFICANT CHANGE UP (ref 0.5–1.3)
CREAT SERPL-MCNC: 1.07 MG/DL
GLUCOSE BLDC GLUCOMTR-MCNC: 134 MG/DL — HIGH (ref 70–99)
GLUCOSE BLDC GLUCOMTR-MCNC: 162 MG/DL — HIGH (ref 70–99)
GLUCOSE SERPL-MCNC: 177 MG/DL — HIGH (ref 70–99)
GLUCOSE SERPL-MCNC: 242 MG/DL
HCT VFR BLD CALC: 40.7 % — SIGNIFICANT CHANGE UP (ref 39–50)
HGB BLD-MCNC: 13.9 G/DL — SIGNIFICANT CHANGE UP (ref 13–17)
MAGNESIUM SERPL-MCNC: 1.6 MG/DL — SIGNIFICANT CHANGE UP (ref 1.6–2.6)
MCHC RBC-ENTMCNC: 31.4 PG — SIGNIFICANT CHANGE UP (ref 27–34)
MCHC RBC-ENTMCNC: 34.2 GM/DL — SIGNIFICANT CHANGE UP (ref 32–36)
MCV RBC AUTO: 91.9 FL — SIGNIFICANT CHANGE UP (ref 80–100)
NRBC # BLD: 0 /100 WBCS — SIGNIFICANT CHANGE UP (ref 0–0)
PHOSPHATE SERPL-MCNC: 2.2 MG/DL — LOW (ref 2.5–4.5)
PLATELET # BLD AUTO: 175 K/UL — SIGNIFICANT CHANGE UP (ref 150–400)
POTASSIUM SERPL-MCNC: 4.1 MMOL/L — SIGNIFICANT CHANGE UP (ref 3.5–5.3)
POTASSIUM SERPL-SCNC: 3.5 MMOL/L
POTASSIUM SERPL-SCNC: 4.1 MMOL/L — SIGNIFICANT CHANGE UP (ref 3.5–5.3)
PROT SERPL-MCNC: 6 G/DL — SIGNIFICANT CHANGE UP (ref 6–8.3)
PROT SERPL-MCNC: 7.3 G/DL
RBC # BLD: 4.43 M/UL — SIGNIFICANT CHANGE UP (ref 4.2–5.8)
RBC # FLD: 13.6 % — SIGNIFICANT CHANGE UP (ref 10.3–14.5)
SODIUM SERPL-SCNC: 135 MMOL/L
SODIUM SERPL-SCNC: 142 MMOL/L — SIGNIFICANT CHANGE UP (ref 135–145)
WBC # BLD: 7.57 K/UL — SIGNIFICANT CHANGE UP (ref 3.8–10.5)
WBC # FLD AUTO: 7.57 K/UL — SIGNIFICANT CHANGE UP (ref 3.8–10.5)

## 2021-03-07 RX ORDER — POTASSIUM PHOSPHATE, MONOBASIC POTASSIUM PHOSPHATE, DIBASIC 236; 224 MG/ML; MG/ML
15 INJECTION, SOLUTION INTRAVENOUS ONCE
Refills: 0 | Status: COMPLETED | OUTPATIENT
Start: 2021-03-07 | End: 2021-03-07

## 2021-03-07 RX ORDER — MAGNESIUM SULFATE 500 MG/ML
1 VIAL (ML) INJECTION ONCE
Refills: 0 | Status: COMPLETED | OUTPATIENT
Start: 2021-03-07 | End: 2021-03-07

## 2021-03-07 RX ORDER — INSULIN LISPRO 100/ML
VIAL (ML) SUBCUTANEOUS
Refills: 0 | Status: DISCONTINUED | OUTPATIENT
Start: 2021-03-07 | End: 2021-03-08

## 2021-03-07 RX ADMIN — Medication 2: at 01:52

## 2021-03-07 RX ADMIN — ENOXAPARIN SODIUM 40 MILLIGRAM(S): 100 INJECTION SUBCUTANEOUS at 11:25

## 2021-03-07 RX ADMIN — Medication 2: at 17:02

## 2021-03-07 RX ADMIN — Medication 2: at 09:50

## 2021-03-07 RX ADMIN — POTASSIUM PHOSPHATE, MONOBASIC POTASSIUM PHOSPHATE, DIBASIC 62.5 MILLIMOLE(S): 236; 224 INJECTION, SOLUTION INTRAVENOUS at 09:00

## 2021-03-07 RX ADMIN — Medication 2 GRAM(S): at 17:01

## 2021-03-07 RX ADMIN — Medication 100 GRAM(S): at 09:00

## 2021-03-07 RX ADMIN — Medication 81 MILLIGRAM(S): at 11:25

## 2021-03-07 RX ADMIN — Medication 600 MILLIGRAM(S): at 06:29

## 2021-03-07 RX ADMIN — Medication 2: at 11:20

## 2021-03-07 RX ADMIN — SODIUM CHLORIDE 3 MILLILITER(S): 9 INJECTION INTRAMUSCULAR; INTRAVENOUS; SUBCUTANEOUS at 13:15

## 2021-03-07 RX ADMIN — Medication 2 GRAM(S): at 06:21

## 2021-03-07 RX ADMIN — SODIUM CHLORIDE 3 MILLILITER(S): 9 INJECTION INTRAMUSCULAR; INTRAVENOUS; SUBCUTANEOUS at 06:41

## 2021-03-07 RX ADMIN — ATORVASTATIN CALCIUM 80 MILLIGRAM(S): 80 TABLET, FILM COATED ORAL at 22:13

## 2021-03-07 RX ADMIN — SODIUM CHLORIDE 150 MILLILITER(S): 9 INJECTION, SOLUTION INTRAVENOUS at 02:23

## 2021-03-07 RX ADMIN — SODIUM CHLORIDE 3 MILLILITER(S): 9 INJECTION INTRAMUSCULAR; INTRAVENOUS; SUBCUTANEOUS at 22:08

## 2021-03-07 RX ADMIN — SODIUM CHLORIDE 150 MILLILITER(S): 9 INJECTION, SOLUTION INTRAVENOUS at 09:33

## 2021-03-07 RX ADMIN — INSULIN GLARGINE 40 UNIT(S): 100 INJECTION, SOLUTION SUBCUTANEOUS at 22:13

## 2021-03-07 RX ADMIN — Medication 600 MILLIGRAM(S): at 17:01

## 2021-03-07 RX ADMIN — PANTOPRAZOLE SODIUM 40 MILLIGRAM(S): 20 TABLET, DELAYED RELEASE ORAL at 11:23

## 2021-03-07 NOTE — PROGRESS NOTE ADULT - SUBJECTIVE AND OBJECTIVE BOX
INTERVAL HPI/OVERNIGHT EVENTS:   K 3.3> 3 riders>3.4> 3 riders   TG 2395>>2027>1460>878>  Lantus given @7PM, Insulin gtt dc'd on 9pm , D10 Dc's , D5 NS continued  Mod SS q4h  Family updated      PRESSORS: [ ] YES [ ] NO  WHICH:    ANTIBIOTICS:                  DATE STARTED:  ANTIBIOTICS:                  DATE STARTED:  ANTIBIOTICS:                  DATE STARTED:    Antimicrobial:    Cardiovascular:    Pulmonary:    Hematalogic:  aspirin enteric coated 81 milliGRAM(s) Oral daily  enoxaparin Injectable 40 milliGRAM(s) SubCutaneous daily    Other:  acetaminophen   Tablet .. 650 milliGRAM(s) Oral every 6 hours PRN  atorvastatin 80 milliGRAM(s) Oral at bedtime  chlorhexidine 4% Liquid 1 Application(s) Topical daily  dextrose 5% + sodium chloride 0.9% 1000 milliLiter(s) IV Continuous <Continuous>  dextrose 50% Injectable 50 milliLiter(s) IV Push once  gemfibrozil 600 milliGRAM(s) Oral two times a day  insulin glargine Injectable (LANTUS) 40 Unit(s) SubCutaneous at bedtime  insulin lispro (ADMELOG) corrective regimen sliding scale   SubCutaneous every 4 hours  morphine  - Injectable 2 milliGRAM(s) IV Push every 6 hours PRN  omega-3-Acid Ethyl Esters 2 Gram(s) Oral two times a day  ondansetron Injectable 4 milliGRAM(s) IV Push every 8 hours PRN  pantoprazole  Injectable 40 milliGRAM(s) IV Push daily  sodium chloride 0.9% lock flush 3 milliLiter(s) IV Push every 8 hours    acetaminophen   Tablet .. 650 milliGRAM(s) Oral every 6 hours PRN  aspirin enteric coated 81 milliGRAM(s) Oral daily  atorvastatin 80 milliGRAM(s) Oral at bedtime  chlorhexidine 4% Liquid 1 Application(s) Topical daily  dextrose 5% + sodium chloride 0.9% 1000 milliLiter(s) IV Continuous <Continuous>  dextrose 50% Injectable 50 milliLiter(s) IV Push once  enoxaparin Injectable 40 milliGRAM(s) SubCutaneous daily  gemfibrozil 600 milliGRAM(s) Oral two times a day  insulin glargine Injectable (LANTUS) 40 Unit(s) SubCutaneous at bedtime  insulin lispro (ADMELOG) corrective regimen sliding scale   SubCutaneous every 4 hours  morphine  - Injectable 2 milliGRAM(s) IV Push every 6 hours PRN  omega-3-Acid Ethyl Esters 2 Gram(s) Oral two times a day  ondansetron Injectable 4 milliGRAM(s) IV Push every 8 hours PRN  pantoprazole  Injectable 40 milliGRAM(s) IV Push daily  sodium chloride 0.9% lock flush 3 milliLiter(s) IV Push every 8 hours    Drug Dosing Weight  Height (cm): 170.2 (04 Mar 2021 06:56)  Weight (kg): 100 (04 Mar 2021 06:56)  BMI (kg/m2): 34.5 (04 Mar 2021 06:56)  BSA (m2): 2.11 (04 Mar 2021 06:56)    CENTRAL LINE: [ ] YES [ ] NO  LOCATION:         BLANKENSHIP: [ ] YES [ ] NO          A-LINE:  [ ] YES [ ] NO  LOCATION:             ICU Vital Signs Last 24 Hrs  T(C): 37.2 (06 Mar 2021 23:00), Max: 37.2 (06 Mar 2021 23:00)  T(F): 99 (06 Mar 2021 23:00), Max: 99 (06 Mar 2021 23:00)  HR: 94 (07 Mar 2021 01:00) (77 - 103)  BP: 145/88 (07 Mar 2021 01:00) (103/58 - 158/87)  BP(mean): 101 (07 Mar 2021 01:00) (61 - 112)  ABP: --  ABP(mean): --  RR: 23 (07 Mar 2021 01:00) (8 - 29)  SpO2: 95% (07 Mar 2021 01:00) (92% - 98%)            03-05 @ 07:01  -  03-06 @ 07:00  --------------------------------------------------------  IN: 3280 mL / OUT: 2000 mL / NET: 1280 mL            PHYSICAL EXAM:  GENERAL: well built, well nourished  HEAD:  Atraumatic, Normocephalic  EYES: EOMI, PERRLA, conjunctiva and sclera clear  ENT: No tonsillar erythema, exudates, or enlargement; Moist mucous membranes, Good dentition, No lesions  NECK: Supple, No JVD, Normal thyroid, no enlarged nodes  NERVOUS SYSTEM:  Alert & Oriented X3, Good concentration; Motor Strength 5/5 B/L upper and lower extremities; DTRs 2+ intact and symmetric, sensory intact  CHEST/LUNG: B/L good air entry; No rales, rhonchi, or wheezing  HEART: S1S2 normal, no S3, Regular rate and rhythm; No murmurs, rubs, or gallops  ABDOMEN: Soft, Nontender, Nondistended; Bowel sounds present  EXTREMITIES:  2+ Peripheral Pulses, No clubbing, cyanosis, or edema  LYMPH: No lymphadenopathy noted  SKIN: No rashes or         LABS:  CBC Full  -  ( 06 Mar 2021 06:09 )  WBC Count : 9.92 K/uL  RBC Count : 4.89 M/uL  Hemoglobin : 14.9 g/dL  Hematocrit : 43.9 %  Platelet Count - Automated : 197 K/uL  Mean Cell Volume : 89.8 fl  Mean Cell Hemoglobin : 30.5 pg  Mean Cell Hemoglobin Concentration : 33.9 gm/dL  Auto Neutrophil # : 7.78 K/uL  Auto Lymphocyte # : 1.22 K/uL  Auto Monocyte # : 0.69 K/uL  Auto Eosinophil # : 0.12 K/uL  Auto Basophil # : 0.05 K/uL  Auto Neutrophil % : 78.4 %  Auto Lymphocyte % : 12.3 %  Auto Monocyte % : 7.0 %  Auto Eosinophil % : 1.2 %  Auto Basophil % : 0.5 %    03-06    141  |  110<H>  |  4<L>  ----------------------------<  90  3.4<L>   |  23  |  0.73    Ca    8.5      06 Mar 2021 14:47  Phos  2.6     03-06  Mg     1.8     03-06    TPro  6.5  /  Alb  3.1<L>  /  TBili  0.6  /  DBili  x   /  AST  28  /  ALT  35  /  AlkPhos  84  03-06            RADIOLOGY & ADDITIONAL STUDIES REVIEWED:  ***    [ ]GOALS OF CARE DISCUSSION WITH PATIENT/FAMILY/PROXY:    CRITICAL CARE TIME SPENT: 35 minutes

## 2021-03-07 NOTE — PROGRESS NOTE ADULT - ASSESSMENT
40M, from home, self ambulatory  with a PMH of HTN, HLD, DM, h/o Pancreatitis, h/o TG elevation, Chronic Alcohol Abuse, Chronic nicotine user through vaping, Paraspinal hernia of back s/p surgery who presented with a chief complaint of hypertriglyceridemia. Patient's triglycerides were noted to be in 4000's with no signs of pancreatitis initially and was started on medications and IVF. However, now with pancreatitis.     -Acute pancreatitis   -Hypertriglyceridemia   -Diabetes Mellitus   -Hypertension  -Chronic Alcoholic     Plan:    CNS: No issues     CVS: Hypertension: Patient with hx of HTN, BP stable  off medications     Respiratory: No issues     GI:   -Acute pancreatitis secondary to hypertriglyceridemia and alcohol use:   Patient meets 2/3 criteria with typical abdominal pain and elevated lipase 1901   Triglycerides-3013   CT abdomen: Decreased peripancreatic stranding/fluid with mild residual peripancreatic stranding near the groove, suggesting resolving pancreatitis.  Will give 3 L IVF Bolus   Start on insulin drip given elevated triglycerides   -C/w D5 NS @ 150cc /hr while being on insulin drip   pain control, NPO, Bowel rest      -Chronic Alcoholic: patient is chronic alcoholic, drinks 4 beers in 2 weeks    no prior hx of withdrawal   CIWA protocol      Endo:  Hypertriglyceridemia: C/w insulin drip, Lopid, Lovaza and high intensity atorvastatin   Endo following     DM: Follow A1C, C/w insulin drip till TG <1000    Renal:   CT scan showed: : Question striated bilateral nephrograms. Nonspecific mild bilateral perinephric stranding without hydroureteronephrosis. Non obstructing bilateral renal stones again noted. Bilateral renal scarring.   No urinary symptoms     ID: No issues     PPx: Lovenox , PPI

## 2021-03-07 NOTE — CHART NOTE - NSCHARTNOTEFT_GEN_A_CORE
40M, from home, self ambulatory  with a PMH of HTN, HLD, DM, h/o Pancreatitis, h/o TG elevation, Chronic Alcohol Abuse, Chronic nicotine user through vaping, Paraspinal hernia of back s/p surgery who presented with a chief complaint of hypertriglyceridemia.  Patient states that he was seen by his PCP for his routine appointment and his blood work was s/o  cholesterol level in 8000s and was recommended to visit the ED. Pt is asymptomatic and denies any abdominal pain, N/V/D or any other complaints.   Pt was admitted at [facility] In Feb ICU, for Acute panreatitis and elevated TG. Pt was started on Insulin drip on prior admission and was sent home with insulin and Lopid. Pt mentions that during around Dec2020- Jan2021, he ran out of his refills for insulin and lopid and did not have medications for almost 2 months.   Pt mentions taking vape, takes 4-5 beers/2weeks and denies any substance abuse.   FHX- not significant     Brief Hospital Course: In the ED, Patient was awake alert, VS were stable. He was admitted for hypertriglyceridemia. He was started on Lopid and high intensity statin. Patient was seen by endocrinologist, started on Lovaza and kept NPO. He was seen by ICU, patient had no signs of pancreatitis on 3/4 and was started on IVF. However, overnight patient developed abdominal pain. He continued to have mid epigastric pain radiating to the back, no nausea or vomiting. Lipase now elevated to 1900's and CT scan showed: Decreased peripancreatic stranding/fluid with mild residual peripancreatic stranding near the groove, suggesting resolving pancreatitis. Pt was admitted to the ICU and started on an insulin drip. Pt was given lantus at 7pm 3/6 and insulin drip discontinued at 9pm. Pt was on IVF D5 NS + K at 150cc/hr. Started on clear liquids today. Triglycerides downtrended to 531. Patient is stable for downgrade    Things to follow:  triglycerides daily  advance diet as tolerated  Pain control       Patient is stable for downgrade to floor under care of Dr. Cohen for further management , covering resident ---------- was informed. 40M, from home, self ambulatory  with a PMH of HTN, HLD, DM, h/o Pancreatitis, h/o TG elevation, Chronic Alcohol Abuse, Chronic nicotine user through vaping, Paraspinal hernia of back s/p surgery who presented with a chief complaint of hypertriglyceridemia.  Patient states that he was seen by his PCP for his routine appointment and his blood work was s/o  cholesterol level in 8000s and was recommended to visit the ED. Pt is asymptomatic and denies any abdominal pain, N/V/D or any other complaints.   Pt was admitted at [facility] In Feb ICU, for Acute panreatitis and elevated TG. Pt was started on Insulin drip on prior admission and was sent home with insulin and Lopid. Pt mentions that during around Dec2020- Jan2021, he ran out of his refills for insulin and lopid and did not have medications for almost 2 months.   Pt mentions taking vape, takes 4-5 beers/2weeks and denies any substance abuse.   FHX- not significant     Brief Hospital Course: In the ED, Patient was awake alert, VS were stable. He was admitted for hypertriglyceridemia. He was started on Lopid and high intensity statin. Patient was seen by endocrinologist, started on Lovaza and kept NPO. He was seen by ICU, patient had no signs of pancreatitis on 3/4 and was started on IVF. However, overnight patient developed abdominal pain. He continued to have mid epigastric pain radiating to the back, no nausea or vomiting. Lipase now elevated to 1900's and CT scan showed: Decreased peripancreatic stranding/fluid with mild residual peripancreatic stranding near the groove, suggesting resolving pancreatitis. Pt was admitted to the ICU and started on an insulin drip. Pt was given lantus at 7pm 3/6 and insulin drip discontinued at 9pm. Pt was on IVF D5 NS + K at 150cc/hr. Started on clear liquids today. Triglycerides downtrended to 531. Patient is stable for downgrade    Things to follow:  triglycerides daily  advance diet as tolerated  Pain control       Patient is stable for downgrade to floor under care of Dr. Delacruz for further management , covering resident Dr. Daniels  was informed.

## 2021-03-07 NOTE — PROGRESS NOTE ADULT - ATTENDING COMMENTS
IMP: This is a 40 yr old man  from home, self ambulatory  with a  HTN, HLD, DM, h/o Pancreatitis, h/o TG elevation, Chronic Alcohol Abuse, Chronic nicotine user through vaping, Paraspinal hernia of back s/p surgery who presented with a chief complaint of hypertriglyceridemia. Patient's triglycerides were noted to be in 4000's with no signs of pancreatitis initially and was started on medications and IVF. However, now with pancreatitis.             Assessment:  1. Hypertriglyceridemia   2. Diabetes mellitus   3. Class 1 obesity   4. Hypertension   5. Hyperkalemia   6. Acute pancreatitis     Plan  - no abd pain  - Lipase normal  - triglyceride 500, off insulin gtt  - start clear liquid diet  - continue lopid  - Monitor serum electrolytes and glucose  - Started on dextrose iv due to hypoglycemia   - On statins and fibrates by endocrinology  - Recommend aggressive IV fluid resuscitation   - Pain control  - monitor triglyceride level .   - advise to stop smoking / vaping and using alcohol .
IMP: This is a 40 yr old man  from home, self ambulatory  with a  HTN, HLD, DM, h/o Pancreatitis, h/o TG elevation, Chronic Alcohol Abuse, Chronic nicotine user through vaping, Paraspinal hernia of back s/p surgery who presented with a chief complaint of hypertriglyceridemia. Patient's triglycerides were noted to be in 4000's with no signs of pancreatitis initially and was started on medications and IVF. However, now with pancreatitis.             Assessment:  1. Hypertriglyceridemia   2. Diabetes mellitus   3. Class 1 obesity   4. Hypertension   5. Hyperkalemia   6. Acute pancreatitis     Plan  - abd pain is less today  - Lipase and triglyceride improving with insulin gtt  - Q1h FS while on insulin gtt  - Monitor serum electrolytes and glucose  - Started on dextrose iv due to hypoglycemia   - On statins and fibrates by endocrinology  - Recommend aggressive IV fluid resuscitation   - Pain control  - NPO for now  - monitor triglyceride level .   - advise to stop smoking / vaping and using alcohol

## 2021-03-08 ENCOUNTER — TRANSCRIPTION ENCOUNTER (OUTPATIENT)
Age: 41
End: 2021-03-08

## 2021-03-08 VITALS
TEMPERATURE: 99 F | DIASTOLIC BLOOD PRESSURE: 92 MMHG | SYSTOLIC BLOOD PRESSURE: 135 MMHG | OXYGEN SATURATION: 95 % | HEART RATE: 76 BPM | RESPIRATION RATE: 19 BRPM

## 2021-03-08 LAB
ALBUMIN SERPL ELPH-MCNC: 2.9 G/DL — LOW (ref 3.5–5)
ALP SERPL-CCNC: 77 U/L — SIGNIFICANT CHANGE UP (ref 40–120)
ALT FLD-CCNC: 29 U/L DA — SIGNIFICANT CHANGE UP (ref 10–60)
ANION GAP SERPL CALC-SCNC: 5 MMOL/L — SIGNIFICANT CHANGE UP (ref 5–17)
AST SERPL-CCNC: 26 U/L — SIGNIFICANT CHANGE UP (ref 10–40)
BILIRUB SERPL-MCNC: 0.4 MG/DL — SIGNIFICANT CHANGE UP (ref 0.2–1.2)
BUN SERPL-MCNC: 3 MG/DL — LOW (ref 7–18)
CALCIUM SERPL-MCNC: 8.2 MG/DL — LOW (ref 8.4–10.5)
CHLORIDE SERPL-SCNC: 112 MMOL/L — HIGH (ref 96–108)
CO2 SERPL-SCNC: 24 MMOL/L — SIGNIFICANT CHANGE UP (ref 22–31)
CREAT SERPL-MCNC: 0.86 MG/DL — SIGNIFICANT CHANGE UP (ref 0.5–1.3)
GLUCOSE BLDC GLUCOMTR-MCNC: 116 MG/DL — HIGH (ref 70–99)
GLUCOSE BLDC GLUCOMTR-MCNC: 117 MG/DL — HIGH (ref 70–99)
GLUCOSE BLDC GLUCOMTR-MCNC: 143 MG/DL — HIGH (ref 70–99)
GLUCOSE BLDC GLUCOMTR-MCNC: 180 MG/DL — HIGH (ref 70–99)
GLUCOSE SERPL-MCNC: 167 MG/DL — HIGH (ref 70–99)
HCT VFR BLD CALC: 40.6 % — SIGNIFICANT CHANGE UP (ref 39–50)
HGB BLD-MCNC: 13.6 G/DL — SIGNIFICANT CHANGE UP (ref 13–17)
MAGNESIUM SERPL-MCNC: 1.9 MG/DL — SIGNIFICANT CHANGE UP (ref 1.6–2.6)
MCHC RBC-ENTMCNC: 31.1 PG — SIGNIFICANT CHANGE UP (ref 27–34)
MCHC RBC-ENTMCNC: 33.5 GM/DL — SIGNIFICANT CHANGE UP (ref 32–36)
MCV RBC AUTO: 92.7 FL — SIGNIFICANT CHANGE UP (ref 80–100)
NRBC # BLD: 0 /100 WBCS — SIGNIFICANT CHANGE UP (ref 0–0)
PHOSPHATE SERPL-MCNC: 2.5 MG/DL — SIGNIFICANT CHANGE UP (ref 2.5–4.5)
PLATELET # BLD AUTO: 182 K/UL — SIGNIFICANT CHANGE UP (ref 150–400)
POTASSIUM SERPL-MCNC: 3.8 MMOL/L — SIGNIFICANT CHANGE UP (ref 3.5–5.3)
POTASSIUM SERPL-SCNC: 3.8 MMOL/L — SIGNIFICANT CHANGE UP (ref 3.5–5.3)
PROT SERPL-MCNC: 6.2 G/DL — SIGNIFICANT CHANGE UP (ref 6–8.3)
RBC # BLD: 4.38 M/UL — SIGNIFICANT CHANGE UP (ref 4.2–5.8)
RBC # FLD: 13.2 % — SIGNIFICANT CHANGE UP (ref 10.3–14.5)
SODIUM SERPL-SCNC: 141 MMOL/L — SIGNIFICANT CHANGE UP (ref 135–145)
TRIGL SERPL-MCNC: 432 MG/DL — HIGH
WBC # BLD: 5 K/UL — SIGNIFICANT CHANGE UP (ref 3.8–10.5)
WBC # FLD AUTO: 5 K/UL — SIGNIFICANT CHANGE UP (ref 3.8–10.5)

## 2021-03-08 PROCEDURE — 99239 HOSP IP/OBS DSCHRG MGMT >30: CPT | Mod: GC

## 2021-03-08 RX ORDER — ATORVASTATIN CALCIUM 80 MG/1
1 TABLET, FILM COATED ORAL
Qty: 30 | Refills: 0
Start: 2021-03-08

## 2021-03-08 RX ORDER — INSULIN GLARGINE 100 [IU]/ML
40 INJECTION, SOLUTION SUBCUTANEOUS
Qty: 100 | Refills: 0
Start: 2021-03-08 | End: 2021-04-06

## 2021-03-08 RX ORDER — GEMFIBROZIL 600 MG
1 TABLET ORAL
Qty: 60 | Refills: 1
Start: 2021-03-08 | End: 2021-05-06

## 2021-03-08 RX ORDER — INSULIN LISPRO 100/ML
12 VIAL (ML) SUBCUTANEOUS
Qty: 0 | Refills: 0 | DISCHARGE

## 2021-03-08 RX ORDER — INSULIN GLARGINE 100 [IU]/ML
20 INJECTION, SOLUTION SUBCUTANEOUS
Qty: 0 | Refills: 0 | DISCHARGE

## 2021-03-08 RX ORDER — INSULIN LISPRO 100/ML
4 VIAL (ML) SUBCUTANEOUS
Qty: 100 | Refills: 0
Start: 2021-03-08 | End: 2021-04-06

## 2021-03-08 RX ORDER — OMEGA-3 ACID ETHYL ESTERS 1 G
2 CAPSULE ORAL
Qty: 120 | Refills: 0
Start: 2021-03-08 | End: 2021-04-06

## 2021-03-08 RX ORDER — INSULIN LISPRO 100/ML
4 VIAL (ML) SUBCUTANEOUS
Refills: 0 | Status: DISCONTINUED | OUTPATIENT
Start: 2021-03-08 | End: 2021-03-08

## 2021-03-08 RX ADMIN — PANTOPRAZOLE SODIUM 40 MILLIGRAM(S): 20 TABLET, DELAYED RELEASE ORAL at 12:05

## 2021-03-08 RX ADMIN — SODIUM CHLORIDE 3 MILLILITER(S): 9 INJECTION INTRAMUSCULAR; INTRAVENOUS; SUBCUTANEOUS at 13:14

## 2021-03-08 RX ADMIN — ENOXAPARIN SODIUM 40 MILLIGRAM(S): 100 INJECTION SUBCUTANEOUS at 12:05

## 2021-03-08 RX ADMIN — Medication 81 MILLIGRAM(S): at 12:05

## 2021-03-08 RX ADMIN — Medication 2: at 08:04

## 2021-03-08 RX ADMIN — SODIUM CHLORIDE 3 MILLILITER(S): 9 INJECTION INTRAMUSCULAR; INTRAVENOUS; SUBCUTANEOUS at 06:36

## 2021-03-08 RX ADMIN — Medication 600 MILLIGRAM(S): at 17:18

## 2021-03-08 RX ADMIN — Medication 2 GRAM(S): at 06:37

## 2021-03-08 RX ADMIN — SODIUM CHLORIDE 150 MILLILITER(S): 9 INJECTION, SOLUTION INTRAVENOUS at 03:00

## 2021-03-08 RX ADMIN — Medication 600 MILLIGRAM(S): at 06:37

## 2021-03-08 RX ADMIN — Medication 4 UNIT(S): at 17:25

## 2021-03-08 RX ADMIN — Medication 2 GRAM(S): at 17:18

## 2021-03-08 NOTE — DISCHARGE NOTE PROVIDER - NSDCPNSUBOBJ_GEN_ALL_CORE
Patient was seen and examined at bedside   Denies any complains     Vitals stable     P/E:  NAD  AAOx3, no new focal deficit   Lungs CTABL  S1S2 WNL, no MRG  Abd soft and non tender, BS present   BL LE calf tenderness, no edema     Labs noted     A/P:  Acute pancreatitis due to hypertriglyceridemia   DM  HTN  HLD  Smoker   Alcohol use     Plan:   No abd pain  TG trended down   Will dc on Gemfibrozil   Appreciate endocrine recommendations  Stable to be discharged  Patient was counselled to stop smoking and to get atherosclerotic disease

## 2021-03-08 NOTE — PROGRESS NOTE ADULT - PROBLEM SELECTOR PLAN 1
- 2/2 hypertriglyceridemia  - triglycerides downtrended after icu and insulin drip  - now tolerating diet, and denies any pain

## 2021-03-08 NOTE — DISCHARGE NOTE NURSING/CASE MANAGEMENT/SOCIAL WORK - PATIENT PORTAL LINK FT
You can access the FollowMyHealth Patient Portal offered by [facility] by registering at the following website: [email or web address]. By joining [employer]’s FollowMyHealth portal, you will also be able to view your health information using other applications (apps) compatible with our system.

## 2021-03-08 NOTE — DISCHARGE NOTE PROVIDER - HOSPITAL COURSE
40M, from home, self ambulatory  with a PMH of HTN, HLD, DM, h/o Pancreatitis, h/o TG elevation, Chronic Alcohol Abuse, Chronic nicotine user through vaping, Paraspinal hernia of back s/p surgery who presented with a chief complaint of hypertriglyceridemia.  Patient states that he was seen by his PCP for his routine appointment and his blood work was s/o  cholesterol level in 8000s and was recommended to visit the ED. Pt is asymptomatic and denies any abdominal pain, N/V/D or any other complaints.   Pt was admitted at [facility] In Feb ICU, for Acute panreatitis and elevated TG. Pt was started on Insulin drip on prior admission and was sent home with insulin and Lopid. Pt mentions that during around Dec2020- Jan2021, he ran out of his refills for insulin and lopid and did not have medications for almost 2 months.   Pt mentions taking vape, takes 4-5 beers/2weeks and denies any substance abuse.   FHX- not significant     Brief Hospital Course: In the ED, Patient was awake alert, VS were stable. He was admitted for hypertriglyceridemia. He was started on Lopid and high intensity statin. Patient was seen by endocrinologist, started on Lovaza and kept NPO. He was seen by ICU, patient had no signs of pancreatitis on 3/4 and was started on IVF. However, overnight patient developed abdominal pain. He continued to have mid epigastric pain radiating to the back, no nausea or vomiting. Lipase now elevated to 1900's and CT scan showed: Decreased peripancreatic stranding/fluid with mild residual peripancreatic stranding near the groove, suggesting resolving pancreatitis. Pt was admitted to the ICU and started on an insulin drip. Pt was given lantus at 7pm 3/6 and insulin drip discontinued at 9pm. Pt was on IVF D5 NS + K at 150cc/hr. Started on clear liquids today. Triglycerides downtrended to 531. Patient was downgraded to medicine floor on 3/7/21.     Diet was advanced. Patient tolerated well. He was seen by endocrine and was recommended to take lantus 40 at bedtime and 4 admelog pre-meal, to continue the Omega 3, Statin, and Lopid. Patient needs outpatient f/u with Endocrine and cardiology (high risk for Cardiovascular problems).    Pt is stable for discharge. Pt has been advised to follow up as outpatient. Case has nate discussed with the attending. This is just a summary of the case. For further information please refer to pt. chart document.

## 2021-03-08 NOTE — PROGRESS NOTE ADULT - ASSESSMENT
40 year old male  from home, self ambulatory  with a PMH of HTN, HLD, DM, h/o Pancreatitis, h/o TG elevation, Chronic Alcohol Abuse, Chronic nicotine user through vaping, Paraspinal hernia of back s/p surgery who presented with a chief complaint of hypertriglyceridemia.      Hypertriglyceridemia:      Patient presented with triglycerides of 5000.   Patient was started on insulin drip , then bridged to Lantus.  TG improved to 400s  -continue 40 units of Lantus at bed time and 4 units pre meals.  -FS AC/hs  -cont Lopid 600 bid and Lovaza 2 gm bid.  -cont Lipitor 80  -monitor TG levels dailyand FSG  -Patient counseled on Alcohol cessation and low carb low fat diet  nutrition eval        Uncontrolled Diabetes Mellitus:     Patient with hx of Diabetes.  poorly controlled as out pt  A1c- 11.9  Patient started on diet.   Continue lantus 40 units.  -add 4 units fo Admelog premeals. continue woth sliding scale.  fs Ac/hs             40 year old male  from home, self ambulatory  with a PMH of HTN, HLD, DM, h/o Pancreatitis, h/o TG elevation, Chronic Alcohol Abuse, Chronic nicotine user through vaping, Paraspinal hernia of back s/p surgery who presented with a chief complaint of hypertriglyceridemia.      Hypertriglyceridemia:      Patient presented with triglycerides of 5000.   Patient was started on insulin drip , then bridged to Lantus.  TG improved to 400s  -continue 40 units of Lantus at bed time and 4 units pre meals.  -FS AC/hs  -cont Lopid 600 bid and Lovaza 2 gm bid.  -cont Lipitor 80  -Patient counseled on Alcohol cessation and low carb low fat diet  nutrition eval        Uncontrolled Diabetes Mellitus:     Patient with hx of Diabetes.  poorly controlled as out pt  A1c- 11.9  Patient started on diet.   Continue lantus 40 units.  -add 4 units fo Admelog premeals. continue woth sliding scale.  fs Ac/hs

## 2021-03-08 NOTE — DISCHARGE NOTE PROVIDER - NSDCFUSCHEDAPPT_GEN_ALL_CORE_FT
[place] ; 03/18/2021 ; NPP PulmMed [address] [facility] ; 03/23/2021 ; NPP Endocrin [address] [place] ; 03/29/2021 ; NPP Cardio [address] [facility] ; 05/04/2021 ; NPP Med [address] [facility]

## 2021-03-08 NOTE — PROGRESS NOTE ADULT - SUBJECTIVE AND OBJECTIVE BOX
PGY-1 Progress Note discussed with attending    PAGER #: [[phone]] TILL 5:00 PM  PLEASE CONTACT ON CALL TEAM:  - On Call Team (Please refer to Cedrick) FROM 5:00 PM - 8:30PM  - Nightfloat Team FROM 8:30 -7:30 AM    CHIEF COMPLAINT & BRIEF HOSPITAL COURSE:      INTERVAL HPI/OVERNIGHT EVENTS:       REVIEW OF SYSTEMS:  CONSTITUTIONAL: No fever, weight loss, or fatigue  RESPIRATORY: No cough, wheezing, chills or hemoptysis; No shortness of breath  CARDIOVASCULAR: No chest pain, palpitations, dizziness, or leg swelling  GASTROINTESTINAL: No abdominal pain. No nausea, vomiting, or hematemesis; No diarrhea or constipation. No melena or hematochezia.  GENITOURINARY: No dysuria or hematuria, urinary frequency  MUSCULOSKELETAL: No pain, no Limited ROM  NEUROLOGICAL: No headaches, memory loss, loss of strength, numbness, or tremors  SKIN: No itching, burning, rashes, or lesions     MEDICATIONS  (STANDING):  aspirin enteric coated 81 milliGRAM(s) Oral daily  atorvastatin 80 milliGRAM(s) Oral at bedtime  dextrose 5% + sodium chloride 0.9% 1000 milliLiter(s) (150 mL/Hr) IV Continuous <Continuous>  enoxaparin Injectable 40 milliGRAM(s) SubCutaneous daily  gemfibrozil 600 milliGRAM(s) Oral two times a day  insulin glargine Injectable (LANTUS) 40 Unit(s) SubCutaneous at bedtime  insulin lispro (ADMELOG) corrective regimen sliding scale   SubCutaneous Before meals and at bedtime  omega-3-Acid Ethyl Esters 2 Gram(s) Oral two times a day  pantoprazole  Injectable 40 milliGRAM(s) IV Push daily  sodium chloride 0.9% lock flush 3 milliLiter(s) IV Push every 8 hours    MEDICATIONS  (PRN):  acetaminophen   Tablet .. 650 milliGRAM(s) Oral every 6 hours PRN Mild Pain (1 - 3)  ondansetron Injectable 4 milliGRAM(s) IV Push every 8 hours PRN Nausea and/or Vomiting      Vital Signs Last 24 Hrs  T(C): 36.4 (08 Mar 2021 05:30), Max: 37.2 (07 Mar 2021 20:54)  T(F): 97.6 (08 Mar 2021 05:30), Max: 99 (07 Mar 2021 20:54)  HR: 88 (08 Mar 2021 05:30) (82 - 94)  BP: 124/74 (08 Mar 2021 05:30) (124/74 - 140/90)  BP(mean): 102 (07 Mar 2021 12:00) (84 - 102)  RR: 19 (08 Mar 2021 05:30) (19 - 23)  SpO2: 98% (08 Mar 2021 05:30) (97% - 99%)    PHYSICAL EXAMINATION:  GENERAL: NAD, well built  HEAD:  Atraumatic, Normocephalic  EYES:  conjunctiva and sclera clear, no scleral icterus  NECK: Supple, No JVD, Normal thyroid  CHEST/LUNG: Clear to auscultation.  No rales, rhonchi, wheezing, or rubs  HEART: Regular rate and rhythm; No murmurs, rubs, or gallops  ABDOMEN: Soft, Nontender, Nondistended; Bowel sounds present  NERVOUS SYSTEM:  Alert & Oriented X3,  Strength 5/5 in upper and lower extremities, sensation intact  EXTREMITIES:  2+ Peripheral Pulses, No clubbing, cyanosis, or edema  SKIN: warm dry, no lesions noted                          13.6   5.00  )-----------( 182      ( 08 Mar 2021 07:07 )             40.6     03-08    141  |  112<H>  |  3<L>  ----------------------------<  167<H>  3.8   |  24  |  0.86    Ca    8.2<L>      08 Mar 2021 07:07  Phos  2.5     03-08  Mg     1.9     03-08    TPro  6.2  /  Alb  2.9<L>  /  TBili  0.4  /  DBili  x   /  AST  26  /  ALT  29  /  AlkPhos  77  03-08    LIVER FUNCTIONS - ( 08 Mar 2021 07:07 )  Alb: 2.9 g/dL / Pro: 6.2 g/dL / ALK PHOS: 77 U/L / ALT: 29 U/L DA / AST: 26 U/L / GGT: x                 I&O's Summary    07 Mar 2021 07:01  -  08 Mar 2021 07:00  --------------------------------------------------------  IN: 1427.5 mL / OUT: 200 mL / NET: 1227.5 mL          RADIOLOGY & ADDITIONAL TESTS:                   PGY-1 Progress Note discussed with attending    PAGER #: [[phone]] TILL 5:00 PM  PLEASE CONTACT ON CALL TEAM:  - On Call Team (Please refer to Cedrick) FROM 5:00 PM - 8:30PM  - Nightfloat Team FROM 8:30 -7:30 AM    CHIEF COMPLAINT & BRIEF HOSPITAL COURSE:  40M, from home, self ambulatory  with a PMH of HTN, HLD, DM, h/o Pancreatitis, h/o TG elevation, Chronic Alcohol Abuse, Chronic nicotine user through vaping, Paraspinal hernia of back s/p surgery who presented with a chief complaint of hypertriglyceridemia. Patient's triglycerides were noted to be in 4000's with no signs of pancreatitis initially and was started on medications and IVF. However, now with pancreatitis. Patient was in ICU and started on insulin drip, triglyceride trended down and pancreatitis resolved. Downgraded to floors and tolerating diet.     INTERVAL HPI/OVERNIGHT EVENTS:   Patient seen and examined at bedside. Has no complains, wants to eat regular food. He denies any pain, nausea, vomiting, diarrhea. We discussed in detail the risks of having elevated triglycerides and cholesterol including Stroke, heart attack peripheral arterial disease, atherosclerosis blindness ect. He understands and agrees that he needs to follow up more rigorously and take his medications as prescribed.     REVIEW OF SYSTEMS:  CONSTITUTIONAL: No fever, weight loss, or fatigue  RESPIRATORY: No cough, wheezing, chills or hemoptysis; No shortness of breath  CARDIOVASCULAR: No chest pain, palpitations, dizziness, or leg swelling  GASTROINTESTINAL: No abdominal pain. No nausea, vomiting, or hematemesis; No diarrhea or constipation. No melena or hematochezia.  GENITOURINARY: No dysuria or hematuria, urinary frequency  MUSCULOSKELETAL: No pain, no Limited ROM  NEUROLOGICAL: No headaches, memory loss, loss of strength, numbness, or tremors  SKIN: No itching, burning, rashes, or lesions     MEDICATIONS  (STANDING):  aspirin enteric coated 81 milliGRAM(s) Oral daily  atorvastatin 80 milliGRAM(s) Oral at bedtime  dextrose 5% + sodium chloride 0.9% 1000 milliLiter(s) (150 mL/Hr) IV Continuous <Continuous>  enoxaparin Injectable 40 milliGRAM(s) SubCutaneous daily  gemfibrozil 600 milliGRAM(s) Oral two times a day  insulin glargine Injectable (LANTUS) 40 Unit(s) SubCutaneous at bedtime  insulin lispro (ADMELOG) corrective regimen sliding scale   SubCutaneous Before meals and at bedtime  omega-3-Acid Ethyl Esters 2 Gram(s) Oral two times a day  pantoprazole  Injectable 40 milliGRAM(s) IV Push daily  sodium chloride 0.9% lock flush 3 milliLiter(s) IV Push every 8 hours    MEDICATIONS  (PRN):  acetaminophen   Tablet .. 650 milliGRAM(s) Oral every 6 hours PRN Mild Pain (1 - 3)  ondansetron Injectable 4 milliGRAM(s) IV Push every 8 hours PRN Nausea and/or Vomiting      Vital Signs Last 24 Hrs  T(C): 36.4 (08 Mar 2021 05:30), Max: 37.2 (07 Mar 2021 20:54)  T(F): 97.6 (08 Mar 2021 05:30), Max: 99 (07 Mar 2021 20:54)  HR: 88 (08 Mar 2021 05:30) (82 - 94)  BP: 124/74 (08 Mar 2021 05:30) (124/74 - 140/90)  BP(mean): 102 (07 Mar 2021 12:00) (84 - 102)  RR: 19 (08 Mar 2021 05:30) (19 - 23)  SpO2: 98% (08 Mar 2021 05:30) (97% - 99%)    PHYSICAL EXAMINATION:  GENERAL: NAD, obses  HEAD:  Atraumatic, Normocephalic  EYES:  conjunctiva and sclera clear, no scleral icterus  NECK: Supple, No JVD, Normal thyroid  CHEST/LUNG: Clear to auscultation.  No rales, rhonchi, wheezing, or rubs  HEART: Regular rate and rhythm; No murmurs, rubs, or gallops  ABDOMEN: Soft, Nontender, Nondistended; Bowel sounds present  NERVOUS SYSTEM:  Alert & Oriented X3,  Strength 5/5 in upper and lower extremities, sensation intact  EXTREMITIES:  2+ Peripheral Pulses, No clubbing, cyanosis, or edema  SKIN: warm dry, no lesions noted                          13.6   5.00  )-----------( 182      ( 08 Mar 2021 07:07 )             40.6     03-08    141  |  112<H>  |  3<L>  ----------------------------<  167<H>  3.8   |  24  |  0.86    Ca    8.2<L>      08 Mar 2021 07:07  Phos  2.5     03-08  Mg     1.9     03-08    TPro  6.2  /  Alb  2.9<L>  /  TBili  0.4  /  DBili  x   /  AST  26  /  ALT  29  /  AlkPhos  77  03-08    LIVER FUNCTIONS - ( 08 Mar 2021 07:07 )  Alb: 2.9 g/dL / Pro: 6.2 g/dL / ALK PHOS: 77 U/L / ALT: 29 U/L DA / AST: 26 U/L / GGT: x                 I&O's Summary    07 Mar 2021 07:01  -  08 Mar 2021 07:00  --------------------------------------------------------  IN: 1427.5 mL / OUT: 200 mL / NET: 1227.5 mL          RADIOLOGY & ADDITIONAL TESTS:

## 2021-03-08 NOTE — PROGRESS NOTE ADULT - PROBLEM SELECTOR PLAN 4
- pt currently on 40 of lanuts and 4 of admelog  - continue this regimen as outpatient  - f/u with endocrine outpatient

## 2021-03-08 NOTE — PROGRESS NOTE ADULT - PROVIDER SPECIALTY LIST ADULT
Endocrinology
Endocrinology
MICU
Endocrinology
Critical Care
Internal Medicine

## 2021-03-08 NOTE — DISCHARGE NOTE PROVIDER - NSFOLLOWUPCLINICS_GEN_ALL_ED_FT
[place]  Cardiology  [address], [address]  [place] [address]  Phone: [phone]  Fax:

## 2021-03-08 NOTE — PROGRESS NOTE ADULT - ASSESSMENT
40M, from home, self ambulatory  with a PMH of HTN, HLD, DM, h/o Pancreatitis, h/o TG elevation, Chronic Alcohol Abuse, Chronic nicotine user through vaping, Paraspinal hernia of back s/p surgery who presented with a chief complaint of hypertriglyceridemia. Patient's triglycerides were noted to be in 4000's with no signs of pancreatitis initially and was started on medications and IVF. However, now with pancreatitis.

## 2021-03-08 NOTE — PROGRESS NOTE ADULT - PROBLEM SELECTOR PLAN 3
- Pt with Triglycerides of 5K on admission  - downtrended after insulin drip  - started on Lovaza, lopid and atorvastatin 80  - need outpatient follow up with endocrinologist

## 2021-03-08 NOTE — DISCHARGE NOTE PROVIDER - NSDCCPCAREPLAN_GEN_ALL_CORE_FT
PRINCIPAL DISCHARGE DIAGNOSIS  Diagnosis: Pancreatitis  Assessment and Plan of Treatment: You were admitted to the hospital with eleated levels of your triglicerides (over 5,000). Initially you were asymptomatic but later developed signs of pancreatitis secondary to the elevated triglicerides. You were transfered to the ICU, started on a insulin drip until your triglecerides decreased and your pancreatitis improved. You were downgarded to medical floor once you were able to tolerate oral intake. You were also started on Atorvastatin 80mg, Gemfibrozin (Lopid) 600 mg twice a day, and Lovaza  for elevated trigleciredes. You should continue to take this medicaiton as prescribed. Elevated triglicerides and cholesterol can put you at risk of developing atherosclerotic disease, this can cause complications that can lead to peripheral arterial disese, poor wound healing, infections, amputations, coronary artery disease, myocardial ichemia (heart atacks), cerebro vascular accidents (strokes). You need to mainain good controll of your triglicerides and cholesterol. You should excersise at least 150minutes a week and follow a healthy diet low in fat. You should follow up with your PCP within 1 week of discharge for monitoring and medication adjustment.      SECONDARY DISCHARGE DIAGNOSES  Diagnosis: Hypertriglyceridemia  Assessment and Plan of Treatment: You were admitted to the hospital with eleated levels of your triglicerides (over 5,000). Initially you were asymptomatic but later developed signs of pancreatitis secondary to the elevated triglicerides. You were transfered to the ICU, started on a insulin drip until your triglecerides decreased and your pancreatitis improved. You were downgarded to medical floor once you were able to tolerate oral intake. You were also started on Atorvastatin 80mg, Gemfibrozin (Lopid) 600 mg twice a day, and Lovaza  for elevated trigleciredes. You should continue to take this medicaiton as prescribed. Elevated triglicerides and cholesterol can put you at risk of developing atherosclerotic disease, this can cause complications that can lead to peripheral arterial disese, poor wound healing, infections, amputations, coronary artery disease, myocardial ichemia (heart atacks), cerebro vascular accidents (strokes). You need to mainain good controll of your triglicerides and cholesterol. You should excersise at least 150minutes a week and follow a healthy diet low in fat. You should follow up with your PCP within 1 week of discharge for monitoring and medication adjustment.    Diagnosis: HTN (hypertension)  Assessment and Plan of Treatment: You have a history of hypertension and take medication at home. You should continue to take your medications as prescribed. You should follow up with your PCP within 1 week of discharge for monitoring and medication adjustment.    Diagnosis: Diabetes mellitus  Assessment and Plan of Treatment: You have diabetes and take insulin to control your blood sugars. You were on a insulin drip for eleavted triglicerides during your admisison. You were then continued on 40 Units of lantus at bedtime. Endocrinologist saw you and recommended you take 40 Units of lantus at bedtime and 4 of humalog pre-meals. You should continue to monitor your blood sugars and follow a healthy diet low in carbohydrates and complex sugars. You should excersise at least 150 minutes per week. You should follow up with your PCP within 1 week of discharge for monitoring and medication adjustment.     PRINCIPAL DISCHARGE DIAGNOSIS  Diagnosis: Pancreatitis  Assessment and Plan of Treatment: You were admitted to the hospital with eleated levels of your triglicerides (over 5,000). Initially you were asymptomatic but later developed signs of pancreatitis secondary to the elevated triglicerides. You were transfered to the ICU, started on a insulin drip until your triglecerides decreased and your pancreatitis improved. You were downgarded to medical floor once you were able to tolerate oral intake. You were also started on Atorvastatin 80mg, Gemfibrozin (Lopid) 600 mg twice a day, and Lovaza  for elevated trigleciredes. You should continue to take this medicaiton as prescribed. Elevated triglicerides and cholesterol can put you at risk of developing atherosclerotic disease, this can cause complications that can lead to peripheral arterial disese, poor wound healing, infections, amputations, coronary artery disease, myocardial ichemia (heart atacks), cerebro vascular accidents (strokes). You need to mainain good controll of your triglicerides and cholesterol. You should excersise at least 150minutes a week and follow a healthy diet low in fat. You should follow up with your PCP within 1 week of discharge for monitoring and medication adjustment.  You are advised to follow up with a cardiologist.      SECONDARY DISCHARGE DIAGNOSES  Diagnosis: Diabetes mellitus  Assessment and Plan of Treatment: You have diabetes and take insulin to control your blood sugars. You were on a insulin drip for eleavted triglicerides during your admisison. You were then continued on 40 Units of lantus at bedtime. Endocrinologist saw you and recommended you take 40 Units of lantus at bedtime and 4 of humalog pre-meals. You should continue to monitor your blood sugars and follow a healthy diet low in carbohydrates and complex sugars. You should excersise at least 150 minutes per week. You should follow up with your PCP within 1 week of discharge for monitoring and medication adjustment.    Diagnosis: HTN (hypertension)  Assessment and Plan of Treatment: You have a history of hypertension and take medication at home. You should continue to take your medications as prescribed. You should follow up with your PCP within 1 week of discharge for monitoring and medication adjustment.    Diagnosis: Hypertriglyceridemia  Assessment and Plan of Treatment: You were admitted to the hospital with eleated levels of your triglicerides (over 5,000). Initially you were asymptomatic but later developed signs of pancreatitis secondary to the elevated triglicerides. You were transfered to the ICU, started on a insulin drip until your triglecerides decreased and your pancreatitis improved. You were downgarded to medical floor once you were able to tolerate oral intake. You were also started on Atorvastatin 80mg, Gemfibrozin (Lopid) 600 mg twice a day, and Lovaza  for elevated trigleciredes. You should continue to take this medicaiton as prescribed. Elevated triglicerides and cholesterol can put you at risk of developing atherosclerotic disease, this can cause complications that can lead to peripheral arterial disese, poor wound healing, infections, amputations, coronary artery disease, myocardial ichemia (heart atacks), cerebro vascular accidents (strokes). You need to mainain good controll of your triglicerides and cholesterol. You should excersise at least 150minutes a week and follow a healthy diet low in fat. You should follow up with your PCP within 1 week of discharge for monitoring and medication adjustment.

## 2021-03-08 NOTE — DISCHARGE NOTE PROVIDER - NSDCMRMEDTOKEN_GEN_ALL_CORE_FT
alcohol swabs : Apply topically to affected area 4 times a day   Basaglar KwikPen 100 units/mL subcutaneous solution: 20 unit(s) subcutaneous once a day (at bedtime)  gemfibrozil 600 mg oral tablet: 1 tab(s) orally 2 times a day  glucometer (per patient&#x27;s insurance): Test blood sugars four times a day. Dispense #1 glucometer.  HumaLOG KwikPen 100 units/mL injectable solution: 12 unit(s) injectable 3 times a day (before meals)  hydroCHLOROthiazide 25 mg oral tablet: 1 tab(s) orally once a day  Insulin Pen Needles, 4mm: 1 application subcutaneously 4 times a day. ** Use with insulin pen **   lancets: 1 application subcutaneously 4 times a day   test strips (per patient&#x27;s insurance): 1 application subcutaneously 4 times a day. ** Compatible with patient&#x27;s glucometer **

## 2021-03-08 NOTE — PROGRESS NOTE ADULT - SUBJECTIVE AND OBJECTIVE BOX
Interval Events:      Allergies    penicillin (Rash)    Intolerances      Endocrine/Metabolic Medications:  atorvastatin 80 milliGRAM(s) Oral at bedtime  gemfibrozil 600 milliGRAM(s) Oral two times a day  insulin glargine Injectable (LANTUS) 40 Unit(s) SubCutaneous at bedtime  insulin lispro (ADMELOG) corrective regimen sliding scale   SubCutaneous Before meals and at bedtime      Vital Signs Last 24 Hrs  T(C): 36.4 (08 Mar 2021 05:30), Max: 37.2 (07 Mar 2021 20:54)  T(F): 97.6 (08 Mar 2021 05:30), Max: 99 (07 Mar 2021 20:54)  HR: 88 (08 Mar 2021 05:30) (82 - 94)  BP: 124/74 (08 Mar 2021 05:30) (124/74 - 140/90)  BP(mean): 102 (07 Mar 2021 12:00) (102 - 102)  RR: 19 (08 Mar 2021 05:30) (19 - 21)  SpO2: 98% (08 Mar 2021 05:30) (98% - 99%)      PHYSICAL EXAM  All physical exam findings normal, except those marked:  General:	Alert, active, cooperative, NAD, well hydrated  .		[] Abnormal:  Neck		Normal: supple, no cervical adenopathy, no palpable thyroid  .		[] Abnormal:  Cardiovascular	Normal: regular rate, normal S1, S2, no murmurs  .		[] Abnormal:  Respiratory	Normal: no chest wall deformity, normal respiratory pattern, CTA B/L  .		[] Abnormal:  Abdominal	Normal: soft, ND, NT, bowel sounds present, no masses, no organomegaly  .		[] Abnormal:  		Normal normal genitalia, testes descended, circumcised/uncircumcised  .		Bina stage:			Breast bina:  .		Menstrual history:  .		[] Abnormal:  Extremities	Normal: FROM x4  .		[] Abnormal:  Skin		Normal: intact and not indurated, no rash, no acanthosis nigricans  .		[] Abnormal:  Neurologic	Normal: grossly intact  .		[] Abnormal:    LABS                        13.6   5.00  )-----------( 182      ( 08 Mar 2021 07:07 )             40.6                               141    |  112    |  3                   Calcium: 8.2   / iCa: x      (03-08 @ 07:07)    ----------------------------<  167       Magnesium: 1.9                              3.8     |  24     |  0.86             Phosphorous: 2.5      TPro  6.2    /  Alb  2.9    /  TBili  0.4    /  DBili  x      /  AST  26     /  ALT  29     /  AlkPhos  77     08 Mar 2021 07:07    CAPILLARY BLOOD GLUCOSE      POCT Blood Glucose.: 143 mg/dL (08 Mar 2021 11:24)  POCT Blood Glucose.: 180 mg/dL (08 Mar 2021 07:44)  POCT Blood Glucose.: 134 mg/dL (07 Mar 2021 21:48)  POCT Blood Glucose.: 162 mg/dL (07 Mar 2021 16:38)        Assesment/plan       Interval Events:  pt in nad    Allergies    penicillin (Rash)    Intolerances      Endocrine/Metabolic Medications:  atorvastatin 80 milliGRAM(s) Oral at bedtime  gemfibrozil 600 milliGRAM(s) Oral two times a day  insulin glargine Injectable (LANTUS) 40 Unit(s) SubCutaneous at bedtime  insulin lispro (ADMELOG) corrective regimen sliding scale   SubCutaneous Before meals and at bedtime      Vital Signs Last 24 Hrs  T(C): 36.4 (08 Mar 2021 05:30), Max: 37.2 (07 Mar 2021 20:54)  T(F): 97.6 (08 Mar 2021 05:30), Max: 99 (07 Mar 2021 20:54)  HR: 88 (08 Mar 2021 05:30) (82 - 94)  BP: 124/74 (08 Mar 2021 05:30) (124/74 - 140/90)  BP(mean): 102 (07 Mar 2021 12:00) (102 - 102)  RR: 19 (08 Mar 2021 05:30) (19 - 21)  SpO2: 98% (08 Mar 2021 05:30) (98% - 99%)      PHYSICAL EXAM  All physical exam findings normal, except those marked:  General:	Alert, active, cooperative, NAD, well hydrated  .		[] Abnormal:  Neck		Normal: supple, no cervical adenopathy, no palpable thyroid  .		[] Abnormal:  Cardiovascular	Normal: regular rate, normal S1, S2, no murmurs  .		[] Abnormal:  Respiratory	Normal: no chest wall deformity, normal respiratory pattern, CTA B/L  .		[] Abnormal:  Abdominal	Normal: soft, ND, NT, bowel sounds present, no masses, no organomegaly  .		[] Abnormal:  		Normal normal genitalia, testes descended, circumcised/uncircumcised  .		Bina stage:			Breast bina:  .		Menstrual history:  .		[] Abnormal:  Extremities	Normal: FROM x4  .		[] Abnormal:  Skin		Normal: intact and not indurated, no rash, no acanthosis nigricans  .		[] Abnormal:  Neurologic	Normal: grossly intact  .		[] Abnormal:    LABS                        13.6   5.00  )-----------( 182      ( 08 Mar 2021 07:07 )             40.6                               141    |  112    |  3                   Calcium: 8.2   / iCa: x      (03-08 @ 07:07)    ----------------------------<  167       Magnesium: 1.9                              3.8     |  24     |  0.86             Phosphorous: 2.5      TPro  6.2    /  Alb  2.9    /  TBili  0.4    /  DBili  x      /  AST  26     /  ALT  29     /  AlkPhos  77     08 Mar 2021 07:07    CAPILLARY BLOOD GLUCOSE      POCT Blood Glucose.: 143 mg/dL (08 Mar 2021 11:24)  POCT Blood Glucose.: 180 mg/dL (08 Mar 2021 07:44)  POCT Blood Glucose.: 134 mg/dL (07 Mar 2021 21:48)  POCT Blood Glucose.: 162 mg/dL (07 Mar 2021 16:38)        Assesment/plan

## 2021-03-09 PROCEDURE — 87635 SARS-COV-2 COVID-19 AMP PRB: CPT

## 2021-03-09 PROCEDURE — 82607 VITAMIN B-12: CPT

## 2021-03-09 PROCEDURE — 85027 COMPLETE CBC AUTOMATED: CPT

## 2021-03-09 PROCEDURE — 86769 SARS-COV-2 COVID-19 ANTIBODY: CPT

## 2021-03-09 PROCEDURE — 84100 ASSAY OF PHOSPHORUS: CPT

## 2021-03-09 PROCEDURE — 83735 ASSAY OF MAGNESIUM: CPT

## 2021-03-09 PROCEDURE — 74177 CT ABD & PELVIS W/CONTRAST: CPT

## 2021-03-09 PROCEDURE — 82553 CREATINE MB FRACTION: CPT

## 2021-03-09 PROCEDURE — 93005 ELECTROCARDIOGRAM TRACING: CPT

## 2021-03-09 PROCEDURE — 83690 ASSAY OF LIPASE: CPT

## 2021-03-09 PROCEDURE — 80053 COMPREHEN METABOLIC PANEL: CPT

## 2021-03-09 PROCEDURE — 84443 ASSAY THYROID STIM HORMONE: CPT

## 2021-03-09 PROCEDURE — 82550 ASSAY OF CK (CPK): CPT

## 2021-03-09 PROCEDURE — 85025 COMPLETE CBC W/AUTO DIFF WBC: CPT

## 2021-03-09 PROCEDURE — 82962 GLUCOSE BLOOD TEST: CPT

## 2021-03-09 PROCEDURE — 80061 LIPID PANEL: CPT

## 2021-03-09 PROCEDURE — 36415 COLL VENOUS BLD VENIPUNCTURE: CPT

## 2021-03-09 PROCEDURE — 80048 BASIC METABOLIC PNL TOTAL CA: CPT

## 2021-03-09 PROCEDURE — 85610 PROTHROMBIN TIME: CPT

## 2021-03-09 PROCEDURE — 82746 ASSAY OF FOLIC ACID SERUM: CPT

## 2021-03-09 PROCEDURE — 99285 EMERGENCY DEPT VISIT HI MDM: CPT

## 2021-03-09 PROCEDURE — 84484 ASSAY OF TROPONIN QUANT: CPT

## 2021-03-09 PROCEDURE — 83615 LACTATE (LD) (LDH) ENZYME: CPT

## 2021-03-09 PROCEDURE — U0005: CPT

## 2021-03-09 PROCEDURE — 80307 DRUG TEST PRSMV CHEM ANLYZR: CPT

## 2021-03-09 PROCEDURE — 85730 THROMBOPLASTIN TIME PARTIAL: CPT

## 2021-03-09 PROCEDURE — 94640 AIRWAY INHALATION TREATMENT: CPT

## 2021-03-09 PROCEDURE — 84478 ASSAY OF TRIGLYCERIDES: CPT

## 2021-03-10 ENCOUNTER — NON-APPOINTMENT (OUTPATIENT)
Age: 41
End: 2021-03-10

## 2021-03-13 LAB — GLUCOSE BLDC GLUCOMTR-MCNC: 108 MG/DL — HIGH (ref 70–99)

## 2021-03-18 ENCOUNTER — APPOINTMENT (OUTPATIENT)
Dept: PULMONOLOGY | Facility: CLINIC | Age: 41
End: 2021-03-18
Payer: MEDICAID

## 2021-03-18 VITALS
WEIGHT: 213 LBS | RESPIRATION RATE: 16 BRPM | TEMPERATURE: 97.6 F | HEIGHT: 67 IN | OXYGEN SATURATION: 98 % | HEART RATE: 91 BPM | SYSTOLIC BLOOD PRESSURE: 120 MMHG | BODY MASS INDEX: 33.43 KG/M2 | DIASTOLIC BLOOD PRESSURE: 80 MMHG

## 2021-03-18 DIAGNOSIS — Z83.79 FAMILY HISTORY OF OTHER DISEASES OF THE DIGESTIVE SYSTEM: ICD-10-CM

## 2021-03-18 PROCEDURE — 99072 ADDL SUPL MATRL&STAF TM PHE: CPT

## 2021-03-18 PROCEDURE — 99204 OFFICE O/P NEW MOD 45 MIN: CPT | Mod: 25

## 2021-03-18 PROCEDURE — 71046 X-RAY EXAM CHEST 2 VIEWS: CPT

## 2021-03-18 NOTE — REASON FOR VISIT
[Initial] : an initial visit [TextBox_[id number]] : PENELOPE, Abnormal PFTs, GERD, low vitamin D, Polycythemia \par \par \par

## 2021-03-18 NOTE — ASSESSMENT
[FreeTextEntry1] : Mr. PALOMO is a 40 year old male with a history of originally from [facility], DM, HTN, elevated liver enzymes, HLD, hernia disc, AJ, OW, polycythemia, PENELOPE, low vitamin D, Abnormal PFTs who now comes to the office for an initial pulmonary evaluation. Sleep Evaluation/ PENELOPE\par \par \par problem 1: PENELOPE (elevated Mallampati Class, Neck Size over 16, Snore, Abnormal Inspiratory Limb on past PFTs, Polycythemia, blood sugar disturbance) \par -complete split night sleep study \par Sleep apnea is associated with adverse clinical consequences which can affect most organ systems. Cardiovascular disease risk includes arrhythmias, atrial fibrillation, hypertension, coronary artery disease, and stroke. Metabolic disorders include diabetes type 2, non-alcoholic fatty liver disease. Mood disorder especially depression; and cognitive decline especially in the elderly. Associations with chronic reflux/Roth’s esophagus some but not all inclusive. \par -Reasons include arousal consistent with hypopnea; respiratory events most prominent in REM sleep or supine position; therefore sleep staging and body position are important for accurate diagnosis and estimation of AHI. \par \par Treatment options discussed including CPAP/BiPAP machine, oral appliance, ProVent therapy, Oxy-Aid by [employer]pite[employer], new technologies, or positional sleep.Recommended use of the CPAP machine for moderate (AHI >15), moderate to severe (AHI 15-30) and severe patients (AHI > 30). Recommended weight loss which can reduce AHI especially in weight loss of greater than 5% of BMI. Positional sleep is recommended in those with low AHI, low-moderate BMI, and younger age. For severe sleep apnea, the hypoglossal nerve stimulator was recommended as well. \par \par Problem 2: Abnormal PFTs (Abnormal Inspiratory Limb)\par -due to body habitus\par -full PFTs to follow\par \par Problem 3: GERD (quiet)\par -diet controlled\par -Rule of 2s: avoid eating too much, eating too late, eating too spicy, eating two hours before bed.\par -Things to avoid including overeating, spicy foods, tight clothing, eating within three hours of bed, this list is not all inclusive. \par -For treatment of reflux, possible options discussed including diet control, H2 blockers, PPIs, as well as coating motility agents discussed as treatment options. Timing of meals and proximity of last meal to sleep were discussed. If symptoms persist, a formal gastrointestinal evaluation is needed.\par \par Problem 4: Low Vitamin D\par -on Vitamin D supplement\par Has been associated with asthma exacerbations and increased allergic symptoms. The goal based on recent information is maintaining levels between 50-70 and low normal is 30. Recommended 50,000 units every two weeks to once a month depending on the level.\par \par  Problem 5: Polycythemia\par -likely related to hypoxia of Sleep apnea\par -complete MARI 2 Study \par \par problem 6: cardiac disease\par -recommended to continue to follow up with Cardiologist if needed\par \par problem 7: out of shape / overweight\par -complete Functional Medicine Evaluation with Dr. Navarrete \par -Weight loss, exercise, and diet control were discussed and are highly encouraged. Treatment options were given such as, aqua therapy, and contacting a nutritionist. Recommended to use the elliptical, stationary bike, less use of treadmill. Mindful eating was explained to the patient Obesity is associated with worsening asthma, shortness of breath, and potential for cardiac disease, diabetes, and other underlying medical conditions\par \par problem 8: health maintenance \par -recommended yearly flu shot after October 15\par -recommended strep pneumonia vaccines: Prevnar-13 vaccine, followed by Pneumo vaccine 23 one year following after 65\par -recommended early intervention for Upper Respiratory Infections (URIs)\par -recommended regular osteoporosis evaluations\par -recommended early dermatological evaluations\par -recommended after the age of 50 to the age of 70, colonoscopy every 5 years\par \par F/U in 6-8 weeks.\par He is encouraged to call with any changes, concerns, or questions\par

## 2021-03-18 NOTE — PROCEDURE
[FreeTextEntry1] : CXR reveals a normal sized heart; no evidence of infiltrate or effusion--a normal appearing chest radiograph\par  \par Full PFT (7.14.16) revealed normal flows, with a FEV1 of 4.02 L, which is 105% of predicted, normal lung volumes, and a diffusion of 35.5, which is 114% of predicted, with a flattened inspiratory limb. \par \par -Images and procedures reviewed in detail and discussed with patient.

## 2021-03-18 NOTE — ADDENDUM
[FreeTextEntry1] : Documented by Colin Rosales acting as a scribe for Dr. Sg Claudio on 03/18/2021.\par \par All medical record entries made by the Scribe were at my, Dr. Sg Claudio's, direction and personally dictated by me on 03/18/2021 . I have reviewed the chart and agree that the record accurately reflects my personal performance of the history, physical exam, assessment and plan. I have also personally directed, reviewed, and agree with the discharge instructions. \par

## 2021-03-18 NOTE — HISTORY OF PRESENT ILLNESS
[TextBox_4] : Mr. PALOMO is a 40 year old male presenting to the office today for initial pulmonary evaluation. His chief complaint is\par \par -he notes waking fatigued\par -he notes snores\par -he notes he could fall asleep while watching a boring TV show \par -he notes he could fall asleep in a car\par -he notes his memory and concentration are good \par -he notes energy levels decreased from baseline\par -he notes neck size 18\par -he notes recent hospitalization due to elevated blood work admitted and discharged after 5 days\par -he denies SOB\par -he denies orthopnea\par -he denies SOB middle of night\par -he denies ankle swelling\par -he denies history of seasonal or environmental allergies\par -he notes reflux quiet\par \par -he denies any chest pain, chest pressure, diarrhea, constipation, dysphagia, dizziness, leg swelling, leg pain, itchy eyes, itchy ears, heartburn, sour taste in the mouth, myalgias or arthralgias.

## 2021-03-23 ENCOUNTER — APPOINTMENT (OUTPATIENT)
Dept: ENDOCRINOLOGY | Facility: CLINIC | Age: 41
End: 2021-03-23
Payer: MEDICAID

## 2021-03-23 VITALS
HEIGHT: 66.14 IN | WEIGHT: 214 LBS | TEMPERATURE: 96.6 F | SYSTOLIC BLOOD PRESSURE: 128 MMHG | HEART RATE: 86 BPM | OXYGEN SATURATION: 97 % | BODY MASS INDEX: 34.39 KG/M2 | DIASTOLIC BLOOD PRESSURE: 81 MMHG

## 2021-03-23 LAB — GLUCOSE BLDC GLUCOMTR-MCNC: 272

## 2021-03-23 PROCEDURE — 99205 OFFICE O/P NEW HI 60 MIN: CPT | Mod: 25

## 2021-03-23 PROCEDURE — 99072 ADDL SUPL MATRL&STAF TM PHE: CPT

## 2021-03-24 LAB
CHOLEST SERPL-MCNC: 123 MG/DL
HDLC SERPL-MCNC: 25 MG/DL
LDLC SERPL CALC-MCNC: 51 MG/DL
NONHDLC SERPL-MCNC: 98 MG/DL
TRIGL SERPL-MCNC: 237 MG/DL

## 2021-03-25 LAB — PANC ISLET CELL AB SER QL: NORMAL

## 2021-03-25 NOTE — HISTORY OF PRESENT ILLNESS
[FreeTextEntry1] : CC: Diabetes\par This is a 40-year-old male with type 2 diabetes mellitus, severe hypertriglyceridemia, pancreatitis, hypertension, hyperlipidemia, fatty liver, metabolic syndrome, obesity, vitamin D insufficiency here for consultation.\par He was hospitalized from March 4, 2021 until March 8, 2021 at [facility] with pancreatitis after blood work from PCPs office showed triglyceride level greater than 8850 mg/dL.  Hospital records reviewed.  This was his second episode of pancreatitis.  Per records he also has a history of alcohol abuse however he denies this.\par He was started on insulin drip.  Lopid, atorvastatin, and Lovaza were initiated.  Triglycerides on day of discharge were 232.  He is currently on atorvastatin 80 mg daily, gemfibrozil 600 mg 2 times a day, omega-3 4000 mg daily.\par Diabetes was diagnosed on routine blood work in 2016.  He was on Metformin in the past.\par He is currently on Basaglar 40 units daily and Admelog 4 units before meals.\par He self monitors blood glucose 3 times a day.  Fasting 120-220s, lunch 135-140s, dinner 140-170s.  He denies hypoglycemia.\par He saw his ophthalmologist in September 2020 and denies retinopathy.\par He denies neuropathy.\par He denies nephropathy.\par

## 2021-03-25 NOTE — CONSULT LETTER
[Dear  ___] : Dear  [unfilled], [Consult Letter:] : I had the pleasure of evaluating your patient, [unfilled]. [Please see my note below.] : Please see my note below. [Consult Closing:] : Thank you very much for allowing me to participate in the care of this patient.  If you have any questions, please do not hesitate to contact me. [Sincerely,] : Sincerely, [FreeTextEntry3] : Katia Yen MD, FACE\par

## 2021-03-25 NOTE — PHYSICAL EXAM
[Alert] : alert [Well Nourished] : well nourished [Healthy Appearance] : healthy appearance [Obese] : obese [No Acute Distress] : no acute distress [Well Developed] : well developed [Normal Voice/Communication] : normal voice communication [Normal Sclera/Conjunctiva] : normal sclera/conjunctiva [No Proptosis] : no proptosis [No Neck Mass] : no neck mass was observed [No LAD] : no lymphadenopathy [Thyroid Not Enlarged] : the thyroid was not enlarged [No Thyroid Nodules] : no palpable thyroid nodules [No Respiratory Distress] : no respiratory distress [Normal Rate] : heart rate was normal [No Edema] : no peripheral edema [Pedal Pulses Normal] : the pedal pulses are present [Not Distended] : not distended [No Stigmata of Cushings Syndrome] : no stigmata of Cushings Syndrome [Normal Gait] : normal gait [No Clubbing, Cyanosis] : no clubbing  or cyanosis of the fingernails [No Involuntary Movements] : no involuntary movements were seen [Acanthosis Nigricans] : no acanthosis nigricans [Right Foot Was Examined] : right foot ~C was examined [Left Foot Was Examined] : left foot ~C was examined [Normal] : normal [2+] : 2+ in the dorsalis pedis [Diminished Throughout Both Feet] : normal tactile sensation with monofilament testing throughout both feet [No Tremors] : no tremors [Normal Sensation on Monofilament Testing] : normal sensation on monofilament testing of lower extremities [Normal Affect] : the affect was normal [Normal Insight/Judgement] : insight and judgment were intact [Normal Mood] : the mood was normal

## 2021-03-25 NOTE — ASSESSMENT
[FreeTextEntry1] : This is a 40-year-old male with type 2 diabetes mellitus, severe hypertriglyceridemia, pancreatitis, hypertension, hyperlipidemia, fatty liver, metabolic syndrome, obesity, vitamin D insufficiency here for consultation.\par He was hospitalized from March 4, 2021 until March 8, 2021 at [facility] with pancreatitis after blood work from PCPs office showed triglyceride level greater than 8850 mg/dL.  \par 1.  T2DM/metabolic syndrome\par Hemoglobin A1c is uncontrolled (12.6%).  He reports noncompliance with insulin prior to blood work for a few months.\par He is currently on Basaglar 40 units daily and Admelog 4 units before meals.\par Increase Admelog to 8 units before meals.\par Increase Basaglar to 45 units daily.\par Appointment with CDE.  Start Admelog correction scale before meals.\par He was provided with Dexcom CGM sample.\par Avoid GLP-1 agonist and DPP 4 inhibitors due to history of pancreatitis.\par He saw his ophthalmologist in September 2020 and denies retinopathy.\par He denies neuropathy.\par He denies nephropathy however microalbumin present on recent lab results.  Check repeat.  Continue valsartan.\par 2.  Hypertriglyceridemia\par He has had 2 episodes of pancreatitis.  Low carbohydrate/low-fat diet advised.\par He is currently on atorvastatin 80 mg daily, gemfibrozil 600 mg 2 times a day, omega-3 4000 mg daily.\par Check lipid panel.\par Consider lipidology consultation.\par 3.  Hypertension\par Well-controlled.\par 4.  Obesity\par Lifestyle modification.

## 2021-03-28 LAB
GAD65 AB SER-MCNC: 0 NMOL/L
ISLET CELL512 AB SER-SCNC: 0 NMOL/L

## 2021-03-29 ENCOUNTER — NON-APPOINTMENT (OUTPATIENT)
Age: 41
End: 2021-03-29

## 2021-03-29 ENCOUNTER — APPOINTMENT (OUTPATIENT)
Dept: CARDIOLOGY | Facility: CLINIC | Age: 41
End: 2021-03-29
Payer: MEDICAID

## 2021-03-29 VITALS
SYSTOLIC BLOOD PRESSURE: 112 MMHG | TEMPERATURE: 98.2 F | BODY MASS INDEX: 34.07 KG/M2 | OXYGEN SATURATION: 99 % | DIASTOLIC BLOOD PRESSURE: 73 MMHG | HEART RATE: 79 BPM | WEIGHT: 212 LBS | RESPIRATION RATE: 16 BRPM | HEIGHT: 66.14 IN

## 2021-03-29 PROCEDURE — 99072 ADDL SUPL MATRL&STAF TM PHE: CPT

## 2021-03-29 PROCEDURE — 99204 OFFICE O/P NEW MOD 45 MIN: CPT

## 2021-03-29 PROCEDURE — 93000 ELECTROCARDIOGRAM COMPLETE: CPT

## 2021-03-31 RX ORDER — HYDROCHLOROTHIAZIDE 12.5 MG/1
12.5 CAPSULE ORAL
Qty: 30 | Refills: 0 | Status: DISCONTINUED | COMMUNITY
Start: 2021-02-11 | End: 2021-03-31

## 2021-03-31 RX ORDER — HYDROCHLOROTHIAZIDE 12.5 MG/1
12.5 TABLET ORAL
Qty: 90 | Refills: 3 | Status: DISCONTINUED | COMMUNITY
Start: 2020-07-08 | End: 2021-03-31

## 2021-03-31 NOTE — PATIENT PROFILE ADULT - NSPROMUTPARTICIPCAREFT_GEN_A_NUR
31 y/o F presents to ED c/o chest pain and palpitations.  Pt well appearing, VSS, NAD.  Labs wnl.  Pt rested and observed.  She reports feeling better.    Symptoms are likely secondary to drug intoxication.  Pt informed of anemia and advised to f/u with PCP.    Results and diagnosis discussed with patient.  Treatment plan discussed.  Return precautions discussed.  Pt verbalized understanding and given the opportunity to ask questions.  Patient advised to follow up with primary care provider. N/A

## 2021-04-01 ENCOUNTER — APPOINTMENT (OUTPATIENT)
Dept: ENDOCRINOLOGY | Facility: CLINIC | Age: 41
End: 2021-04-01
Payer: MEDICAID

## 2021-04-01 PROCEDURE — G0108 DIAB MANAGE TRN  PER INDIV: CPT

## 2021-04-01 PROCEDURE — 99072 ADDL SUPL MATRL&STAF TM PHE: CPT

## 2021-04-07 ENCOUNTER — OUTPATIENT (OUTPATIENT)
Dept: OUTPATIENT SERVICES | Facility: HOSPITAL | Age: 41
LOS: 1 days | End: 2021-04-07
Payer: MEDICAID

## 2021-04-07 DIAGNOSIS — Z98.89 OTHER SPECIFIED POSTPROCEDURAL STATES: Chronic | ICD-10-CM

## 2021-04-07 DIAGNOSIS — I10 ESSENTIAL (PRIMARY) HYPERTENSION: ICD-10-CM

## 2021-04-07 PROCEDURE — 93306 TTE W/DOPPLER COMPLETE: CPT

## 2021-04-07 PROCEDURE — 93306 TTE W/DOPPLER COMPLETE: CPT | Mod: 26

## 2021-04-07 NOTE — HISTORY OF PRESENT ILLNESS
[FreeTextEntry1] : 41 yo M with T2DM, HTN, HLD, PENELOPE, and severe hypertriglyceridemia (over 8,000 mg/dL at one point) leading to pancreatitis who presents for initial evaluation. Patient reports that he feels well otherwise. Denies any exertional or resting chest pain, dyspnea, palpitations, lightheadedness, or syncope. Denies LE edema, orthopnea, or PND. Patient reports compliance with all medications, denies adverse effects.\par \par Takes Lovaza among other medications, Vascepa was not covered by his insurance. Drinks EtOH generally only on the weekend (has ~6 beers at that time). \par \par \par

## 2021-04-07 NOTE — ASSESSMENT
[FreeTextEntry1] : 41 yo M with T2DM, HTN, HLD, PENELOPE, and severe hypertriglyceridemia (over 8,000 mg/dL at one point) leading to pancreatitis who presents for initial evaluation. Overall he is doing well. \par \par 1. Hypertriglyceridemia: Significantly better controlled now on atorvastatin 80mg, gemfibrozil 600mg PO BID, and Lovaza\par -I would consider switching the latter to Vascepa for improved triglyceride control, however per patient it is not covered by his insurance.\par \par 2. HTN: Seems well controlled on valsartan 80mg PO daily\par -Will send for baseline echocardiogram to assess for LVH\par \par Will call patient with results of testing at [phone], OK to leave VM

## 2021-04-07 NOTE — ADDENDUM
[FreeTextEntry1] : ADDENDUM 4/7: Echocardiogram from today showed preserved EF and no LVH. D/W patient at 14:29.

## 2021-04-11 LAB — ZINC TRANSPORTER 8 AB: <15 U/ML

## 2021-05-04 ENCOUNTER — APPOINTMENT (OUTPATIENT)
Dept: INTERNAL MEDICINE | Facility: CLINIC | Age: 41
End: 2021-05-04
Payer: MEDICAID

## 2021-05-04 VITALS
HEIGHT: 66 IN | BODY MASS INDEX: 33.94 KG/M2 | WEIGHT: 211.2 LBS | TEMPERATURE: 98.4 F | DIASTOLIC BLOOD PRESSURE: 79 MMHG | HEART RATE: 81 BPM | SYSTOLIC BLOOD PRESSURE: 121 MMHG | OXYGEN SATURATION: 99 %

## 2021-05-04 PROCEDURE — 99215 OFFICE O/P EST HI 40 MIN: CPT

## 2021-05-04 PROCEDURE — 99072 ADDL SUPL MATRL&STAF TM PHE: CPT

## 2021-05-04 RX ORDER — BLOOD-GLUCOSE METER
KIT MISCELLANEOUS
Qty: 1 | Refills: 0 | Status: COMPLETED | COMMUNITY
Start: 2020-02-25 | End: 2021-05-04

## 2021-05-04 RX ORDER — CYCLOBENZAPRINE HYDROCHLORIDE 5 MG/1
5 TABLET, FILM COATED ORAL
Qty: 10 | Refills: 0 | Status: COMPLETED | COMMUNITY
Start: 2021-03-01 | End: 2021-05-04

## 2021-05-04 RX ORDER — ERGOCALCIFEROL 1.25 MG/1
1.25 MG CAPSULE, LIQUID FILLED ORAL
Qty: 4 | Refills: 0 | Status: COMPLETED | COMMUNITY
Start: 2020-02-25 | End: 2021-05-04

## 2021-05-04 RX ORDER — CHOLECALCIFEROL (VITAMIN D3) 1250 MCG
1.25 MG CAPSULE ORAL
Qty: 4 | Refills: 2 | Status: COMPLETED | COMMUNITY
Start: 2020-07-08 | End: 2021-05-04

## 2021-05-04 RX ORDER — BLOOD SUGAR DIAGNOSTIC
STRIP MISCELLANEOUS 3 TIMES DAILY
Qty: 270 | Refills: 3 | Status: COMPLETED | COMMUNITY
Start: 2020-07-08 | End: 2021-05-04

## 2021-05-04 RX ORDER — LANCETS 28 GAUGE
EACH MISCELLANEOUS
Qty: 1 | Refills: 6 | Status: COMPLETED | COMMUNITY
Start: 2020-07-08 | End: 2021-05-04

## 2021-05-04 NOTE — ASSESSMENT
[FreeTextEntry1] : 1 dm  ---The following has been discussed:---\par -Targets for weight and HgA1c have been discussed with patient \par -FS goals have been reviewed with the patient in detail:\par AM <130 post meal<160-180\par -Diet and weight goals have been discussed with the patient in detail.\par -The importance of exercise in the treatment of diabetes has been discussed \par with the patient in detail.\par -Extensive dietary advice provided to patient and the need to avoid concentrated \par sweets/simple carbohydrates and to ensure to consume protein with each meal. \par -Patient instructed to limit carbohydrates to 60 gms per meal and 15 gms per \par snacks. \par -Patient to keep a blood sugar log to check fasting and before meals\par -Patient instructed on importance of daily feet inspection and to reports any \par open lesions to physician promptly\par  He has markedly improved and is checking his blood sugars regularly and his average 132   and is taking his medications regularly now  \par 2 hypertriglyceridemia  markedly improved and is taking medication regualrly and will check his liver enzymes today.   His chol is  normal 123 triglycerides are down tyo 200s.  \par 3.  satish leep apnea is associated with adverse clinical consequences which an affect most organ systems. Cardiovascular disease risk includes arrhythmias, atrial fibrillation, hypertension, coronary artery disease, and stroke. Metabolic disorders include diabetes type 2, non-alcoholic fatty liver disease. Mood disorder especially depression; and cognitive decline especially in the elderly. Associations with chronic reflux/Roth’s esophagus some but not all inclusive. \par -Reasons include arousal consistent with hypopnea; respiratory events most prominent in REM sleep or supine position; therefore sleep staging and body position are important for accurate diagnosis and estimation of AHI. \par \par He will have testing this week for determination  if he has  this.  \par 4.  hpn well controlled continue present medications.  \par 5 Fatty Liver: The patient denies any jaundice or pruritus. The patient denies any alcohol use. The patient denies taking large doses of nonsteroidal anti-inflammatory drugs or acetaminophen. The findings are suggestive of fatty liver. The patient and I had a long discussion regarding the risks of fatty liver progressing to cirrhosis. The patient was told of the possible increased risk of developing liver failure, cirrhosis, ascites, GI bleeding secondary to varices, hepatic encephalopathy, bleeding tendencies and liver cancer. The patient was told of the importance of follow-up. The patient was advised to follow up every 6 months for blood work and imaging studies. The patient agreed and will follow up. The patient was advised to lose weight. I recommend a trial of vitamin E supplementation for the fatty liver. If the liver enzymes remain elevated, the patient may require a trial of Pioglitazone for the fatty liver. I recommend avoid alcohol and hepato-toxic agents. The patient was also advised to avoid NSAIDs, Acetaminophen and any other hepatotoxic drugs. The patient was also advised not to share needles, razors, scissors, nail clippers, etc.. The patient is to continue close follow-up in our office for blood work and exams. If the liver enzymes remain elevated, the patient may require a CT guided liver biopsy to assess the liver parenchyma and for possible treatment. We had a long discussion regarding the risks and benefits of the procedure. The patient was told of the risks of bleeding, perforation, infections, emergency surgery and missing lesions. The patient agreed and will follow-up to reassess the symptoms Patient has been counseled on life style interventions, including but not limited to: weight loss (3-5% loss of body weight might improve fat in the liver, while 7-10% needed for potential improvement of other components, including inflammation and scarring of the liver, called fibrosis); healthy diet, avoiding added sugars, sodas, avoiding saturated fats, limiting sodium, avoiding alcohol; and on the importance of regular exercise (> 150 min/week moderate intensity aerobic exercise with at least 2x/week muscle strengthening or exercise as tolerated). \par - I explained to patient the natural Hx of NAFLD. \par \par check liver enzymes  reorder us of abd and fibroscan.  \par 6 polycythemia   fu cbc

## 2021-05-04 NOTE — COUNSELING
[Sleep ___ hours/day] : Sleep [unfilled] hours/day [Engage in a relaxing activity] : Engage in a relaxing activity [Plan in advance] : Plan in advance [Potential consequences of obesity discussed] : Potential consequences of obesity discussed [Benefits of weight loss discussed] : Benefits of weight loss discussed [Structured Weight Management Program suggested:] : Structured weight management program suggested [Encouraged to maintain food diary] : Encouraged to maintain food diary [Encouraged to increase physical activity] : Encouraged to increase physical activity [Encouraged to use exercise tracking device] : Encouraged to use exercise tracking device [Weigh Self Weekly] : weigh self weekly [Decrease Portions] : decrease portions [____ min/wk Activity] : [unfilled] min/wk activity [Keep Food Diary] : keep food diary [FreeTextEntry1] : diabetic diet

## 2021-05-04 NOTE — PHYSICAL EXAM
[Well Nourished] : well nourished [No Acute Distress] : no acute distress [Well Developed] : well developed [Well-Appearing] : well-appearing [Normal Sclera/Conjunctiva] : normal sclera/conjunctiva [PERRL] : pupils equal round and reactive to light [EOMI] : extraocular movements intact [Normal Outer Ear/Nose] : the outer ears and nose were normal in appearance [Normal Oropharynx] : the oropharynx was normal [No JVD] : no jugular venous distention [No Lymphadenopathy] : no lymphadenopathy [Supple] : supple [Thyroid Normal, No Nodules] : the thyroid was normal and there were no nodules present [No Respiratory Distress] : no respiratory distress  [No Accessory Muscle Use] : no accessory muscle use [Clear to Auscultation] : lungs were clear to auscultation bilaterally [Normal Rate] : normal rate  [Regular Rhythm] : with a regular rhythm [Normal S1, S2] : normal S1 and S2 [No Murmur] : no murmur heard [No Carotid Bruits] : no carotid bruits [No Abdominal Bruit] : a ~M bruit was not heard ~T in the abdomen [No Varicosities] : no varicosities [Pedal Pulses Present] : the pedal pulses are present [No Edema] : there was no peripheral edema [No Palpable Aorta] : no palpable aorta [No Extremity Clubbing/Cyanosis] : no extremity clubbing/cyanosis [Soft] : abdomen soft [Non Tender] : non-tender [Non-distended] : non-distended [No Masses] : no abdominal mass palpated [No HSM] : no HSM [Normal Bowel Sounds] : normal bowel sounds [Normal Posterior Cervical Nodes] : no posterior cervical lymphadenopathy [Normal Anterior Cervical Nodes] : no anterior cervical lymphadenopathy [No CVA Tenderness] : no CVA  tenderness [No Spinal Tenderness] : no spinal tenderness [No Joint Swelling] : no joint swelling [Grossly Normal Strength/Tone] : grossly normal strength/tone [No Rash] : no rash [Coordination Grossly Intact] : coordination grossly intact [No Focal Deficits] : no focal deficits [Normal Gait] : normal gait [Deep Tendon Reflexes (DTR)] : deep tendon reflexes were 2+ and symmetric [Normal Affect] : the affect was normal [Normal Insight/Judgement] : insight and judgment were intact [de-identified] : back on neck right side nodule non tender   soft  mobile

## 2021-05-04 NOTE — HEALTH RISK ASSESSMENT
[No] : In the past 12 months have you used drugs other than those required for medical reasons? No [No falls in past year] : Patient reported no falls in the past year [] : No [de-identified] : cardiologist  Dr Ibarra, pulmonary Dr Claudio , endocrinology Dr Mejia.     [de-identified] : exercises regularly  [de-identified] : diabetic low fat diet

## 2021-05-04 NOTE — HISTORY OF PRESENT ILLNESS
[FreeTextEntry1] : diabetes  hypertriglyceridemia post hospital  [de-identified] :  pt was found to have elevated triglycerides and  uncontrolled blood sugars and was sent to hospital and was admitted.  He devleoped pancreatitis as well while in hospital .  In the ED, Patient was awake alert, VS were stable. He\par was admitted for hypertriglyceridemia. He was started on Lopid and high\par intensity statin. Patient was seen by endocrinologist, started on Lovaza and\par kept NPO. He was seen by ICU, patient had no signs of pancreatitis on 3/4 and\par was started on IVF. However, overnight patient developed abdominal pain. He\par continued to have mid epigastric pain radiating to the back, no nausea or\par vomiting. Lipase now elevated to 1900's and CT scan showed: Decreased\par peripancreatic stranding/fluid with mild residual peripancreatic stranding near\par the groove, suggesting resolving pancreatitis. Pt was admitted to the ICU and\par started on an insulin drip. Pt was given lantus at 7pm 3/6 and insulin drip\par discontinued at 9pm. Pt was on IVF D5 NS + K at 150cc/hr. Started on clear\par liquids today. Triglycerides downtrended to 531. Patient was downgraded to\par medicine floor on 3/7/21.\par \par Diet was advanced. Patient tolerated well. He was seen by endocrine and was\par recommended to take lantus 40 at bedtime and 4 admelog pre-meal, to continue\par the Omega 3, Statin, and Lopid. Patient needs outpatient f/u with Endocrine and\par cardiology (high risk for Cardiovascular problems).\par \par  \par He has seen pulmonary  for satish  abn pfts .  He has seen cardiologist and endocrinologist and diabetic educator.

## 2021-05-05 LAB
ALBUMIN SERPL ELPH-MCNC: 5.2 G/DL
ALP BLD-CCNC: 87 U/L
ALT SERPL-CCNC: 48 U/L
AST SERPL-CCNC: 80 U/L
BASOPHILS # BLD AUTO: 0.04 K/UL
BASOPHILS NFR BLD AUTO: 0.8 %
BILIRUB DIRECT SERPL-MCNC: 0.1 MG/DL
BILIRUB INDIRECT SERPL-MCNC: 0.3 MG/DL
BILIRUB SERPL-MCNC: 0.4 MG/DL
EOSINOPHIL # BLD AUTO: 0.08 K/UL
EOSINOPHIL NFR BLD AUTO: 1.6 %
FERRITIN SERPL-MCNC: 241 NG/ML
HCT VFR BLD CALC: 49.2 %
HGB BLD-MCNC: 15.1 G/DL
IMM GRANULOCYTES NFR BLD AUTO: 0.4 %
IRON SATN MFR SERPL: 28 %
IRON SERPL-MCNC: 100 UG/DL
LYMPHOCYTES # BLD AUTO: 1.33 K/UL
LYMPHOCYTES NFR BLD AUTO: 26.3 %
MAN DIFF?: NORMAL
MCHC RBC-ENTMCNC: 30.7 GM/DL
MCHC RBC-ENTMCNC: 31.4 PG
MCV RBC AUTO: 102.3 FL
MONOCYTES # BLD AUTO: 0.42 K/UL
MONOCYTES NFR BLD AUTO: 8.3 %
NEUTROPHILS # BLD AUTO: 3.16 K/UL
NEUTROPHILS NFR BLD AUTO: 62.6 %
PLATELET # BLD AUTO: 339 K/UL
PROT SERPL-MCNC: 7.6 G/DL
RBC # BLD: 4.81 M/UL
RBC # FLD: 13.8 %
TIBC SERPL-MCNC: 354 UG/DL
UIBC SERPL-MCNC: 254 UG/DL
WBC # FLD AUTO: 5.05 K/UL

## 2021-05-07 ENCOUNTER — APPOINTMENT (OUTPATIENT)
Dept: DISASTER EMERGENCY | Facility: CLINIC | Age: 41
End: 2021-05-07

## 2021-05-08 LAB — SARS-COV-2 N GENE NPH QL NAA+PROBE: NOT DETECTED

## 2021-05-09 ENCOUNTER — FORM ENCOUNTER (OUTPATIENT)
Age: 41
End: 2021-05-09

## 2021-05-10 ENCOUNTER — APPOINTMENT (OUTPATIENT)
Dept: SLEEP CENTER | Facility: CLINIC | Age: 41
End: 2021-05-10
Payer: MEDICAID

## 2021-05-10 ENCOUNTER — OUTPATIENT (OUTPATIENT)
Dept: OUTPATIENT SERVICES | Facility: HOSPITAL | Age: 41
LOS: 1 days | End: 2021-05-10
Payer: MEDICAID

## 2021-05-10 DIAGNOSIS — Z98.89 OTHER SPECIFIED POSTPROCEDURAL STATES: Chronic | ICD-10-CM

## 2021-05-10 PROCEDURE — 95810 POLYSOM 6/> YRS 4/> PARAM: CPT | Mod: 26

## 2021-05-10 PROCEDURE — 95810 POLYSOM 6/> YRS 4/> PARAM: CPT

## 2021-05-11 DIAGNOSIS — G47.33 OBSTRUCTIVE SLEEP APNEA (ADULT) (PEDIATRIC): ICD-10-CM

## 2021-05-11 LAB — TM INTERPRETATION: NORMAL

## 2021-05-13 ENCOUNTER — APPOINTMENT (OUTPATIENT)
Dept: ULTRASOUND IMAGING | Facility: HOSPITAL | Age: 41
End: 2021-05-13
Payer: MEDICAID

## 2021-05-13 ENCOUNTER — OUTPATIENT (OUTPATIENT)
Dept: OUTPATIENT SERVICES | Facility: HOSPITAL | Age: 41
LOS: 1 days | End: 2021-05-13
Payer: MEDICAID

## 2021-05-13 DIAGNOSIS — Z98.89 OTHER SPECIFIED POSTPROCEDURAL STATES: Chronic | ICD-10-CM

## 2021-05-13 DIAGNOSIS — R22.1 LOCALIZED SWELLING, MASS AND LUMP, NECK: ICD-10-CM

## 2021-05-13 PROCEDURE — 76700 US EXAM ABDOM COMPLETE: CPT | Mod: 26

## 2021-05-13 PROCEDURE — 76536 US EXAM OF HEAD AND NECK: CPT | Mod: 26

## 2021-05-13 PROCEDURE — 76536 US EXAM OF HEAD AND NECK: CPT

## 2021-05-13 PROCEDURE — 76700 US EXAM ABDOM COMPLETE: CPT

## 2021-05-17 ENCOUNTER — APPOINTMENT (OUTPATIENT)
Dept: SURGERY | Facility: CLINIC | Age: 41
End: 2021-05-17
Payer: MEDICAID

## 2021-05-17 VITALS
HEIGHT: 66 IN | SYSTOLIC BLOOD PRESSURE: 108 MMHG | HEART RATE: 62 BPM | DIASTOLIC BLOOD PRESSURE: 67 MMHG | BODY MASS INDEX: 33.91 KG/M2 | WEIGHT: 211 LBS

## 2021-05-17 VITALS — TEMPERATURE: 96.2 F

## 2021-05-17 PROCEDURE — 99072 ADDL SUPL MATRL&STAF TM PHE: CPT

## 2021-05-17 PROCEDURE — 10060 I&D ABSCESS SIMPLE/SINGLE: CPT

## 2021-05-17 PROCEDURE — 99203 OFFICE O/P NEW LOW 30 MIN: CPT | Mod: 25

## 2021-05-17 NOTE — PHYSICAL EXAM
[Normal Breath Sounds] : Normal breath sounds [Normal Rate and Rhythm] : normal rate and rhythm [Skin Induration] : induration [Alert] : alert [Oriented to Person] : oriented to person [Oriented to Place] : oriented to place [Oriented to Time] : oriented to time [Calm] : calm [de-identified] : A/Ox3; NAD. appears comfortable [de-identified] : EOMI; sclera anicteric. Nasal mucosa pink, septum midline. Oral mucosa pink. Tongue midline, Pharynx without exudates. [de-identified] : large abscess, tender/induration w fluctuance, posterior aspect of R. side of neck [de-identified] : abd is soft, NT/ND\par +insulin device to the lower abdomen  [de-identified] : +ROM, no joint swelling [de-identified] : as noted above

## 2021-05-17 NOTE — HISTORY OF PRESENT ILLNESS
[de-identified] : EDDIE PALOMO is a 40 year old M w PMHX T2DM, who is referred to the office for consultation visit, he presents w the cc of having a large painful lump behind right side of neck. Patient states he first noticed a small lump to the area for some time, which has gotten larger and painful over the past 3-4 weeks. Presently, not on PO ABX.

## 2021-05-17 NOTE — PLAN
[FreeTextEntry1] : PROCEDURE\par Informed consent was obtained. All the options, benefits and risks of the procedure discussed.\par Pre op diagnosis: abscess, right posterior neck, large\par Post op diagnosis: same\par Procedure: Incision and drainage of abscess\par Anaesthesia: Local\par Prepping and draping was done in the usual sterile manner. Local anesthetic / lidocaine was injected in the area. The fluctuant part was incised and the pus was drained. Hemostat was used to drain the cavity. Irrigated and packed. Sent for culture. Dressings applied\par Wound care discussed.\par \par

## 2021-05-17 NOTE — REVIEW OF SYSTEMS
[Skin Lesions] : skin lesion [Fever] : no fever [Chills] : no chills [Feeling Poorly] : not feeling poorly [Chest Pain] : no chest pain [Lower Ext Edema] : no extremity edema [Shortness Of Breath] : no shortness of breath [Cough] : no cough [Abdominal Pain] : no abdominal pain [Hesitancy] : no urinary hesitancy [Arthralgias] : no arthralgias [Joint Pain] : no joint pain [Confused] : no confusion [Dizziness] : no dizziness [Anxiety] : no anxiety [Muscle Weakness] : no muscle weakness [Swollen Glands] : no swollen glands [de-identified] : painful abscess, behind right side of neck

## 2021-05-17 NOTE — CONSULT LETTER
[Dear  ___] : Dear  [unfilled], [Consult Letter:] : I had the pleasure of evaluating your patient, [unfilled]. [Consult Closing:] : Thank you very much for allowing me to participate in the care of this patient.  If you have any questions, please do not hesitate to contact me. [Sincerely,] : Sincerely, [FreeTextEntry3] : José Manuel Helton MD\par

## 2021-05-24 ENCOUNTER — APPOINTMENT (OUTPATIENT)
Dept: SURGERY | Facility: CLINIC | Age: 41
End: 2021-05-24
Payer: MEDICAID

## 2021-05-24 LAB — BACTERIA WND CULT: ABNORMAL

## 2021-05-24 PROCEDURE — 99024 POSTOP FOLLOW-UP VISIT: CPT

## 2021-05-24 NOTE — HISTORY OF PRESENT ILLNESS
[de-identified] : EDDIE PALOMO presents to the office for postoperative visit today, he is S/P incision and drainage of abscess to right posterior neck, 05/17/21. Wound has healed, patient states his tenderness has resolved. No bleeding or drainage from the area. No fevers/chills.

## 2021-05-24 NOTE — REASON FOR VISIT
[Post Op: _________] : a [unfilled] post op visit [FreeTextEntry1] : S/P incision and drainage of abscess to right posterior neck, 05/17/21

## 2021-05-24 NOTE — ASSESSMENT
[FreeTextEntry1] : EDDIE PALOMO presents to the office for postoperative visit today, he is S/P incision and drainage of abscess to right posterior neck, 05/17/21. \par \par Incision site is healing well and as expected. There is no evidence of infection/complication, and patient is progressing as expected. Post-operative wound care, activities, restrictions and precautions were reinforced. \par

## 2021-05-24 NOTE — PLAN
[FreeTextEntry1] : patient was informed of significance of findings\par he was advised to RTO for follow up visit in about 2 months if he feels a residual lump to the area \par \par Patient's questions and concerns addressed to their satisfaction, and patient verbalized an understanding of the information discussed.\par

## 2021-05-24 NOTE — PHYSICAL EXAM
[Normal Breath Sounds] : Normal breath sounds [Normal Rate and Rhythm] : normal rate and rhythm [Alert] : alert [Oriented to Person] : oriented to person [Oriented to Place] : oriented to place [Oriented to Time] : oriented to time [Calm] : calm [de-identified] : A/Ox3; NAD. appears comfortable [de-identified] : incision site healing well, no induration/fluctuance, no pustular drainage or bleeding , minimal swelling

## 2021-06-05 LAB
ALBUMIN SERPL ELPH-MCNC: 4.8 G/DL
ALP BLD-CCNC: 66 U/L
ALT SERPL-CCNC: 14 U/L
AST SERPL-CCNC: 20 U/L
BILIRUB DIRECT SERPL-MCNC: 0.1 MG/DL
BILIRUB INDIRECT SERPL-MCNC: 0.3 MG/DL
BILIRUB SERPL-MCNC: 0.5 MG/DL
PROT SERPL-MCNC: 6.9 G/DL

## 2021-06-11 ENCOUNTER — APPOINTMENT (OUTPATIENT)
Dept: PULMONOLOGY | Facility: CLINIC | Age: 41
End: 2021-06-11
Payer: MEDICAID

## 2021-06-11 VITALS — BODY MASS INDEX: 29.03 KG/M2 | HEIGHT: 67 IN | WEIGHT: 185 LBS

## 2021-06-11 PROCEDURE — 99214 OFFICE O/P EST MOD 30 MIN: CPT | Mod: 95

## 2021-06-11 NOTE — HISTORY OF PRESENT ILLNESS
[Home] : at home, [unfilled] , at the time of the visit. [Medical Office: ([facility])___] : at the medical office located in  [Verbal consent obtained from patient] : the patient, [unfilled] [TextBox_4] : Mr. PALOMO is a 40 year old male with a history of originally from [facility], DM, HTN, elevated liver enzymes, HLD, hernia disc, AJ, OW, polycythemia, PENELOPE, low vitamin D, Abnormal PFTs who presents via video for follow up visit. \par \par His chief concern is discussing PSG results. \par \par Patient admits to snoring, nonrestorative sleep and EDS. \par Patient states he goes to sleep at 11:30 PM and awakens at 7 AM. \par \par He denies sleepy driving, multiple nighttime awakenings, or awakening with dry mouth or headache.\par He denies any pulmonary issues at this time.

## 2021-06-11 NOTE — ASSESSMENT
[FreeTextEntry1] : Mr. PALOMO is a 40 year old male with a history of originally from [facility], DM, HTN, elevated liver enzymes, HLD, hernia disc, AJ, OW, polycythemia, PENELOPE, low vitamin D, Abnormal PFTs who presents via video for follow up visit. His chief concern is discussing PSG results. \par \par 1. PENELOPE: \par - I have discussed all the negative health consequences associated with obstructive and central sleep apnea including heart conditions/MI, hypertension, diabetes, chronic inflammation, memory issues, stroke, obesity, decreased libido, sleep related accidents, as well as anxiety and depression. Additional recommendations included: Avoid alcohol and sedatives at bedtime. Proper sleep hygiene such as maintaining a regular sleep routine, avoiding naps if possible, not watching TV or reading in bed,  and maintaining a quiet, comfortable bedroom. Sleepy driving avoidance and risks discussed with patient. Diet, exercise and weight loss suggested\par - Additionally, I have discussed the need for compliance to using the machine nightly for adequate therapy as well as for ongoing coverage by insurance companies. Without adequate compliance, the DME company/insurance company may deny the patient replacement equipment or may also have the right to re-possess the machine.  Compliance to most insurance companies requires 4 hours or greater of pap use nightly.  The patient verbalized understanding and knows the next course of action. He will await to hear from the [employer], [employer].\par - RX for Dreamstation 2 APAP 4-20 CMH2O w/ FFM to be fit to pt's size and comfort.\par \par Patient to follow up 31-91 days after initiation of device use. \par Patient to call with further questions and concerns.\par Patient verbalizes understanding of care plan and is agreeable.\par \par

## 2021-06-11 NOTE — DISCUSSION/SUMMARY
[FreeTextEntry1] : 5/10/21 [facility] PSG shows: Mild sleep-disordered breathing was observed with a respiratory index of 8.4 events/hour.\par \par Discussed Respiratory Effort Related Arousal is an event that causes an arousal or a decrease in oxygen saturation, without qualifying as an apnea or hypopnea.  An arousal is when the sensors measuring your brain activity, called EEG, show that your brain waves change to alpha wave form. This means you’ve woken up even if you don’t realize it. It is the repeated waking of your brain as a result of a respiratory disturbance lasting at least 10 seconds that causes the damage from sleep apnea, regardless of the type of event that is occurring or whether the breathing pause is a full pause or a partial pause. Therefore, many sleep centers and insurance companies are starting to recognize RERA as an indicator of the severity of disease.\par \par

## 2021-06-11 NOTE — PHYSICAL EXAM
[No Acute Distress] : no acute distress [Well Nourished] : well nourished [No Deformities] : no deformities [No Resp Distress] : no resp distress [Oriented x3] : oriented x3 [Normal Mood] : normal mood [Normal Affect] : normal affect [TextBox_11] : Unable to evaluate d/t video visit.  [TextBox_44] : Unable to evaluate d/t video visit.  [TextBox_54] : Unable to evaluate d/t video visit.  [TextBox_68] : Unable to evaluate d/t video visit.  [TextBox_80] : Unable to evaluate d/t video visit.  [TextBox_99] : Unable to evaluate d/t video visit.  [TextBox_105] : Unable to evaluate d/t video visit.  [TextBox_125] : Unable to evaluate d/t video visit.  [TextBox_132] : Unable to evaluate d/t video visit.

## 2021-06-22 ENCOUNTER — APPOINTMENT (OUTPATIENT)
Dept: ENDOCRINOLOGY | Facility: CLINIC | Age: 41
End: 2021-06-22

## 2021-07-22 ENCOUNTER — APPOINTMENT (OUTPATIENT)
Dept: INTERNAL MEDICINE | Facility: CLINIC | Age: 41
End: 2021-07-22
Payer: MEDICAID

## 2021-07-22 VITALS
HEIGHT: 67 IN | HEART RATE: 77 BPM | BODY MASS INDEX: 29.19 KG/M2 | SYSTOLIC BLOOD PRESSURE: 122 MMHG | WEIGHT: 186 LBS | TEMPERATURE: 97.4 F | OXYGEN SATURATION: 96 % | DIASTOLIC BLOOD PRESSURE: 76 MMHG

## 2021-07-22 DIAGNOSIS — E66.9 OBESITY, UNSPECIFIED: ICD-10-CM

## 2021-07-22 DIAGNOSIS — L02.11 CUTANEOUS ABSCESS OF NECK: ICD-10-CM

## 2021-07-22 DIAGNOSIS — R22.1 LOCALIZED SWELLING, MASS AND LUMP, NECK: ICD-10-CM

## 2021-07-22 DIAGNOSIS — R53.83 OTHER MALAISE: ICD-10-CM

## 2021-07-22 DIAGNOSIS — R73.09 OTHER ABNORMAL GLUCOSE: ICD-10-CM

## 2021-07-22 DIAGNOSIS — Z86.69 PERSONAL HISTORY OF OTHER DISEASES OF THE NERVOUS SYSTEM AND SENSE ORGANS: ICD-10-CM

## 2021-07-22 DIAGNOSIS — I10 ESSENTIAL (PRIMARY) HYPERTENSION: ICD-10-CM

## 2021-07-22 DIAGNOSIS — R74.8 ABNORMAL LEVELS OF OTHER SERUM ENZYMES: ICD-10-CM

## 2021-07-22 DIAGNOSIS — Z09 ENCOUNTER FOR FOLLOW-UP EXAMINATION AFTER COMPLETED TREATMENT FOR CONDITIONS OTHER THAN MALIGNANT NEOPLASM: ICD-10-CM

## 2021-07-22 DIAGNOSIS — Z86.2 PERSONAL HISTORY OF DISEASES OF THE BLOOD AND BLOOD-FORMING ORGANS AND CERTAIN DISORDERS INVOLVING THE IMMUNE MECHANISM: ICD-10-CM

## 2021-07-22 DIAGNOSIS — Z87.898 PERSONAL HISTORY OF OTHER SPECIFIED CONDITIONS: ICD-10-CM

## 2021-07-22 DIAGNOSIS — R53.81 OTHER MALAISE: ICD-10-CM

## 2021-07-22 DIAGNOSIS — Z86.39 PERSONAL HISTORY OF OTHER ENDOCRINE, NUTRITIONAL AND METABOLIC DISEASE: ICD-10-CM

## 2021-07-22 DIAGNOSIS — Z01.818 ENCOUNTER FOR OTHER PREPROCEDURAL EXAMINATION: ICD-10-CM

## 2021-07-22 DIAGNOSIS — L70.0 ACNE VULGARIS: ICD-10-CM

## 2021-07-22 PROCEDURE — 99213 OFFICE O/P EST LOW 20 MIN: CPT | Mod: 25

## 2021-07-22 PROCEDURE — 99395 PREV VISIT EST AGE 18-39: CPT | Mod: 25

## 2021-07-22 RX ORDER — INSULIN ASPART 100 [IU]/ML
100 INJECTION, SOLUTION INTRAVENOUS; SUBCUTANEOUS
Qty: 8 | Refills: 6 | Status: COMPLETED | COMMUNITY
Start: 2020-02-25 | End: 2021-07-22

## 2021-07-22 RX ORDER — DICLOFENAC SODIUM 1% 10 MG/G
1 GEL TOPICAL
Qty: 1 | Refills: 0 | Status: COMPLETED | COMMUNITY
Start: 2021-03-01 | End: 2021-07-22

## 2021-07-22 NOTE — PHYSICAL EXAM
[Well Developed] : well developed [Well Nourished] : well nourished [Normal Voice Quality] : was normal [Normal Verbal Skills] : the patient had normal verbal communication skills [Normal Nonverbal Skills] : normal nonverbal communication skills were demonstrated [Conjunctiva] : the conjunctiva were normal in both eyes [PERRL] : pupils were equal in size, round, and reactive to light [EOM Intact] : extraocular movements were intact [Retinal Vessels A-V Crossing Defects] : A-V crossing defects noted [Normal Appearance] : was normal in appearance [Neck Supple] : was supple [Rate ___] : at [unfilled] breaths per minute [Normal Rhythm/Effort] : normal respiratory rhythm and effort [Clear Bilaterally] : the lungs were clear to auscultation bilaterally [Normal to Percussion] : the lungs were normal to percussion [Heart Rate ___] : [unfilled] bpm [Normal Rate] : normal [Normal S1] : normal S1 [Normal S2] : normal S2 [No Murmur] : no murmurs heard [No Pitting Edema] : no pitting edema present [2+] : left 2+ [No Abnormalities] : the abdominal aorta was not enlarged and no bruit was heard [Examination Of The Breasts] : a normal appearance [No Discharge] : no discharge [Soft, Nontender] : the abdomen was soft and nontender [No Mass] : no masses were palpated [No HSM] : no hepatosplenomegaly noted [None] : no CVA tenderness [Penis Abnormality] : normal uncircumcised penis [Scrotum] : the scrotum was normal [Epididymis] : the epididymides were normal [Testes Tenderness] : no tenderness of the testes [No Lymphangitis] : no lymphangitis observed [Normal Kyphosis] : normal kyphosis [No Visual Abnormalities] : no visible abnormalities [Normal Lordosis] : normal lordosis [No Scoliosis] : no scoliosis [No Tenderness to Palpation] : no spine tenderness on palpation [No Masses] : no masses [Full ROM] : full ROM [No Pain with ROM] : no pain with motion in any direction [Intact] : all reflexes within normal limits bilaterally [Normal Station and Gait] : the gait and station were normal [Normal Motor Tone] : the muscle tone was normal [Involuntary Movements] : no involuntary movements were seen [Normal Scalp] : inspection of the scalp showed no abnormalities [Complexion Medium] : medium complexion [Tattoo - Single] : a single tattoo was observed [Normal] : the deep tendon reflexes were normal [Normal Mental Status] : the patient's orientation, memory, attention, language and fund of knowledge were normal [Appropriate] : appropriate [Comprehensive Foot Exam Normal] : Right and left foot were examined and both feet are normal. No ulcers in either foot. Toes are normal and with full ROM.  Normal tactile sensation with monofilament testing throughout both feet [Enlarged Diffusely] : was not enlarged [JVP Elevated ___cm] : the JVP was not elevated [S3] : no S3 [S4] : no S4 [Rt] : no varicose veins of the right leg [Lt] : no varicose veins of the left leg [Right Carotid Bruit] : no bruit heard over the right carotid [Left Carotid Bruit] : no bruit heard over the left carotid [Right Femoral Bruit] : no bruit heard over the right femoral artery [Left Femoral Bruit] : no bruit heard over the left femoral artery [Bruit] : no bruit heard [Postauricular Lymph Nodes Enlarged Bilaterally] : nodes not enlarged [Preauricular Lymph Nodes Enlarged Bilaterally] : nodes not enlarged [Submandibular Lymph Nodes Enlarged Bilaterally] : nodes not enlarged [Suboccipital Lymph Nodes Enlarged Bilaterally] : nodes not enlarged [Submental Lymph Nodes Enlarged] : nodes not enlarged [Cervical Lymph Nodes Enlarged Posterior Bilaterally] : nodes not enlarged [Cervical Lymph Nodes Enlarged Anterior Bilaterally] : nodes not enlarged [Supraclavicular Lymph Nodes Enlarged Bilaterally] : nodes not enlarged [Axillary Lymph Nodes Enlarged Bilaterally] : nodes not enlarged [Epitrochlear Lymph Nodes Enlarged Bilaterally] : nodes not enlarged [Femoral Lymph Nodes Enlarged Bilaterally] : nodes not enlarged [Inguinal Lymph Nodes Enlarged Bilaterally] : nodes not enlarged [Abnormal Color] : normal color and pigmentation [Skin Lesions 1] : no skin lesions were observed [Skin Turgor Decreased] : normal skin turgor [Impaired judgment] : intact judgment [Impaired Insight] : intact insight [de-identified] : tongue normal teeth in goo d repair

## 2021-07-22 NOTE — COUNSELING
[Sleep ___ hours/day] : Sleep [unfilled] hours/day [Engage in a relaxing activity] : Engage in a relaxing activity [Plan in advance] : Plan in advance [Potential consequences of obesity discussed] : Potential consequences of obesity discussed [Benefits of weight loss discussed] : Benefits of weight loss discussed [Structured Weight Management Program suggested:] : Structured weight management program suggested [Encouraged to maintain food diary] : Encouraged to maintain food diary [Encouraged to increase physical activity] : Encouraged to increase physical activity [Encouraged to use exercise tracking device] : Encouraged to use exercise tracking device [Target Wt Loss Goal ___] : Weight Loss Goals: Target weight loss goal [unfilled] lbs [Weigh Self Weekly] : weigh self weekly [Decrease Portions] : decrease portions [____ min/wk Activity] : [unfilled] min/wk activity [Keep Food Diary] : keep food diary [FreeTextEntry1] : diabetic  [FreeTextEntry2] : ideal 139- 159   he is 89 bmi 29

## 2021-07-22 NOTE — HEALTH RISK ASSESSMENT
[Good] : ~his/her~  mood as  good [No] : In the past 12 months have you used drugs other than those required for medical reasons? No [No falls in past year] : Patient reported no falls in the past year [0] : 2) Feeling down, depressed, or hopeless: Not at all (0) [PHQ-2 Negative - No further assessment needed] : PHQ-2 Negative - No further assessment needed [HIV Test offered] : HIV Test offered [Hepatitis C test offered] : Hepatitis C test offered [None] : None [With Family] : lives with family [# of Members in Household ___] :  household currently consist of [unfilled] member(s) [Employed] : employed [College] : College [Single] : single [# Of Children ___] : has [unfilled] children [Sexually Active] : sexually active [Feels Safe at Home] : Feels safe at home [Fully functional (bathing, dressing, toileting, transferring, walking, feeding)] : Fully functional (bathing, dressing, toileting, transferring, walking, feeding) [Fully functional (using the telephone, shopping, preparing meals, housekeeping, doing laundry, using] : Fully functional and needs no help or supervision to perform IADLs (using the telephone, shopping, preparing meals, housekeeping, doing laundry, using transportation, managing medications and managing finances) [Smoke Detector] : smoke detector [Carbon Monoxide Detector] : carbon monoxide detector [Safety elements used in home] : safety elements used in home [Seat Belt] :  uses seat belt [Sunscreen] : uses sunscreen [FreeTextEntry1] : c/o constipation [] : No [de-identified] : pulmonary Dr Brandt  , surg Dr Helton  [de-identified] :  exercises regularly  5 days a week.  running  lifting  [de-identified] : diabetic diet  [[id number]] : 0 [Change in mental status noted] : No change in mental status noted [Language] : denies difficulty with language [Behavior] : denies difficulty with behavior [Learning/Retaining New Information] : denies difficulty learning/retaining new information [Handling Complex Tasks] : denies difficulty handling complex tasks [Reasoning] : denies difficulty with reasoning [Spatial Ability and Orientation] : denies difficulty with spatial ability and orientation [Reports changes in hearing] : Reports no changes in hearing [Reports changes in vision] : Reports no changes in vision [Reports changes in dental health] : Reports no changes in dental health [Guns at Home] : no guns at home [Travel to Developing Areas] : does not  travel to developing areas [TB Exposure] : is not being exposed to tuberculosis [Caregiver Concerns] : does not have caregiver concerns [HepatitisCDate] : 2020 [FreeTextEntry2] : sales  [de-identified] : last yr exam  [de-identified] : last exam 1month ago  [AdvancecareDate] : 7/21

## 2021-07-22 NOTE — ASSESSMENT
[FreeTextEntry1] : health He is up to date with vaccine but could obtain pneumonia vaccines.   he  needs eye exam  \par 2 bmi 29  Weight loss, exercise, and diet control were discussed and are highly encouraged. Treatment options were given such as, aqua therapy, and contacting a nutritionist. Recommended to use the elliptical, stationary bike, less use of treadmill. Mindful eating was explained to the patient Obesity is associated with worsening asthma, shortness of breath, and potential for cardiac disease, diabetes, and other underlying medical conditions.\par 3  Fatty Liver: The patient denies any jaundice or pruritus. The patient denies any alcohol use. The patient denies taking large doses of nonsteroidal anti-inflammatory drugs or acetaminophen. The findings are suggestive of fatty liver. The patient and I had a long discussion regarding the risks of fatty liver progressing to cirrhosis. The patient was told of the possible increased risk of developing liver failure, cirrhosis, ascites, GI bleeding secondary to varices, hepatic encephalopathy, bleeding tendencies and liver cancer. The patient was told of the importance of follow-up. The patient was advised to follow up every 6 months for blood work and imaging studies. The patient agreed and will follow up. The patient was advised to lose weight. I recommend a trial of vitamin E supplementation for the fatty liver. If the liver enzymes remain elevated, the patient may require a trial of Pioglitazone for the fatty liver. I recommend avoid alcohol and hepato-toxic agents. The patient was also advised to avoid NSAIDs, Acetaminophen and any other hepatotoxic drugs. The patient was also advised not to share needles, razors, scissors, nail clippers, etc.. The patient is to continue close follow-up in our office for blood work and exams. If the liver enzymes remain elevated, the patient may require a CT guided liver biopsy to assess the liver parenchyma and for possible treatment. We had a long discussion regarding the risks and benefits of the procedure. The patient was told of the risks of bleeding, perforation, infections, emergency surgery and missing lesions. The patient agreed and will follow-up to reassess the symptoms Patient has been counseled on life style interventions, including but not limited to: weight loss (3-5% loss of body weight might improve fat in the liver, while 7-10% needed for potential improvement of other components, including inflammation and scarring of the liver, called fibrosis); healthy diet, avoiding added sugars, sodas, avoiding saturated fats, limiting sodium, avoiding alcohol; and on the importance of regular exercise (> 150 min/week moderate intensity aerobic exercise with at least 2x/week muscle strengthening or exercise as tolerated). \par - I explained to patient the natural Hx of NAFLD. \par 4.  hpn  \par DIETARY SALT (SODIUM); DASH DIET AND BLOOD PRESSURE:\par To decrease the sodium in your diet: \par · Use fresh vegetables and foods as much as possible.\par · Avoid canned and processed foods. Cured meats such as bashir, ham, and sausages are high in salt.\par · Try using different herbs and spices in your cooking instead of salt.\par · In restaurants, avoid foods with sauces, cheese, and cured meats. Ask for low-sodium choices.\par To get more potassium in your diet, eat:\par · Bananas, fresh or dried apricots, peaches, citrus fruits, melons\par · Cauliflower, broccoli, tomatoes, carrots, raw spinach, beet greens, potatoes\par To get more magnesium in your diet, eat:\par · Whole grain foods, leafy green vegetables, dried fruits\par • Fish and seafood, poultry \par To get more calcium in your diet, eat:\par · Nonfat milk, yogurt, and low-fat cheeses \par · Otto and sardines\par · Cooked dried beans\par · Broccoli, kale, and bok ramana\par · Tofu or soybean curd\par DASH stands for "dietary approaches to stop hypertension." The DASH diet is low in total and saturated fat. It is rich in fruits, vegetables, and low-fat dairy foods. The diet allows you to get natural fiber, calcium, and magnesium from food. It prevents or lowers high blood pressure. It can also help lower cholesterol in your blood. \par Don't change how you eat all at once. It's much more likely that you'll succeed if you make only one or two small changes at a time. Wait until those changes are a habit, then make a couple more changes. Some good starting steps include: \par Add one serving of vegetables to your meals at lunch and dinner. This is an easy way to help you get more vegetables in your diet. \par Have a piece of fruit as an afternoon or after-dinner snack. One glass of juice at breakfast is not enough fruit in your diet. \par Use half your usual amount of butter, margarine, or salad dressing. \par Buy nonfat salad dressing or nonfat sour cream.\par Follow this guide to select your menu of meals. The number of calories we want you to eat each day will tell you how many servings you can choose from each food group.\par Calories: 1600 2100 2600 3100 Servings Grains 6 7 ½ 10 ½ 12 ½ Vegetables 	 4 4 ½ 5 6 Fruit 4 5 5 6 Dairy (low-fat) 2 ½ 3 3 3 ½ Meat, poultry, fish ½ 1 ½ 2 2 ½ Nuts, seeds ½ ½ ½ 1 Fats and sweets 1 ½ 2 ½ 3 4\par Grains and grain products like breads and cereals provide energy, fiber and vitamins. Whole grains have more of these nutrients. One serving equals one of the following:\par Bagel, 1/2 medium; Barley, cooked 1/2 cup; Biscuit, country style 1 medium; Bread, whole wheat, white 1 slice; Cereals, cold or cooked, 1/2 cup; Cornbread, 1 medium piece; Crackers, larry, 2; Crackers, saltine, 4; Dinner roll, 1medium; English muffin, ½; Hamburger bun, ½; Muffin, 1 medium; Pancake, 1 medium; Pasta, 1/2 cup cooked; Ashley, 1/2 large or 1 small; Popcorn, 1 cup popped; Pretzels, 1 ounce; Rice, white, brown, or wild, 1/2 cup cooked; Tortillas, corn or flour, 1 medium; Waffle, 1 medium; Wheat germ, 1/4 cup; \par Vegetables are rich sources of potassium, magnesium, and fiber. One serving is 1/2 cup of any of the following cooked vegetables:\par Asparagus, Beans (green, yellow), Beets, Broccoli, [place] Sprouts, Carrots, Cauliflower, Oj, chicory, mustard and turnip (and other) greens, Corn, Kale, Lima beans, Mixed vegetables, Okra, Parsnips, Peas, green, Potatoes (1/2 medium or 1/2 cup mashed), Pumpkin, Rutabaga, Spaghetti or tomato sauce, Spinach, Squash (zucchini or yellow), Stewed tomatoes, Succotash, Sweet potatoes, Turnips, Yam \par Raw vegetables: Carrots,1/2 cup; Celery, 1/2 cup; Lettuce (katie, loose-leaf, green-leaf), 1 cup; Peppers, 1/2 cup; Spinach, 1 cup; Tomato, 1/2\par Fruits and fruit juices are important sources of potassium and magnesium. Fruits also contain fiber and are low in sodium and fat. One serving equals:\par Any fruit juice, # cup (6 ounces); Canned or frozen fruit, ½ cup (includes applesauce); Dried fruit, ¼ cup; \par Fresh fruit:\par Apple, 1 medium; Apricots, 2 medium; Banana, 1 medium; Berries, 1/2 cup; Melon, 1 wedge, or 1/2 cup; Cherries, 10; Grapefruit, 1/2; Grapes, 15; Kiwi, 1 medium; Isai, 1/2 small; Nectarine, 1 medium; Orange, 1 medium; Peach, 1 medium; Pear, 1 medium; Pineapple, 1/2 cup; Plums, 2 medium; Tangerine, 1 large\par Dairy foods provide protein and calcium. Use low-fat or nonfat dairy products to cut down on fat. One serving equals:\par Skim milk, 1 cup (8 ounces); 1% low fat milk, 1 cup (8 ounces); 2% low fat milk, 1 cup (8 ounces) nonfat dry milk powder (1/3 cup); Low-fat cottage cheese, 1 cup (8 ounces); Parmesan cheese, 1 tablespoon; Mozzarella cheese, part skim, 1/4 cup (1 ounce); Low-fat cheddar cheese, 11/2 ounces; Ricotta cheese, part skim milk or nonfat, 1/4 cup (11/2 ounces); Other low fat or nonfat cheeses (11/2 oz.); Low-fat or nonfat yogurt, fruit-flavored or plain, 1 cup (8 ounces)\par Low-fat or nonfat frozen yogurt, 1/2 cup (4 ounces); Note: People who can't digest dairy products can try taking lactase enzyme pills or drops (available at drug and grocery stores) when they eat dairy. There is also milk available with the enzyme already added. Or you can buy lactose-free milk.\par Meat, poultry, and fish are good sources of protein and magnesium. One serving equals:\par Lean meat including beef, veal, or pork, 3 ounces cooked; Skinless, white meat poultry including turkey, chicken, 3 ounces; Fish and shellfish, 3 ounces cooked; Low-fat luncheon meats, 1 ounce; Egg, 1 medium; Tofu, 3 ounces\par Note: Three ounces of cooked meat is about the size of a deck of cards.\par Nuts, seeds, and legumes are rich sources of energy, magnesium, potassium, protein and fiber. Nuts and seeds are also high in fat, so portions should be small.\par Almonds, 1/3 cup; Beans such as kidney, ruiz, and navy, 1/2 cup cooked; Chickpeas and lentils, 1/2 cup cooked; Cashews, 1/3 cup; Filberts, 1/3 cup; Mixed nuts, 1/3 cup; Peanut butter, 2 tablespoons; Peanuts, 1/3 cup; Sesame seeds, 2 tablespoons; Sunflower seeds, 2 tablespoons; Tofu, regular, 3 ounces; Walnuts, 1/3 cup \par Following the above diet will give you about 27% fat in your diet. The goal is to have 30% or less of the calories you eat each day be from fat. To meet that goal, do not eat more than 2-3 servings daily of added fat. Also try to limit sweets. One serving equals:\par Butter or margarine, 1 teaspoon; Mayonnaise, 1 teaspoon; Low-fat mayonnaise, 1 tablespoon; Salad dressing, 1 tablespoon; Low-fat salad dressing, 2 tablespoons; Oil, 1 teaspoon (use olive, canola, safflower, or other vegetable oils); Candy, hard, 3 pieces; Jelly or jam, 1 tablespoon); Jell-O, 1/2 cup; Jelly beans, 1/2 ounce; Maple syrup, 1 tablespoon; Popsicle, 1; Sherbet or nonfat or low-fat frozen yogurt, 1/2 cup; Sugar, 1 tablespoon; Sugared lemonade or fruit punch, 1 cup (8 ounces); Note: Try diet fruit-flavored gelatin or frozen, canned, or fresh fruit for dessert.\par \par Small amounts of alcohol may have benefits to the heart and blood pressure. However, excess use of alcohol can cause damage to the brain, liver and other organs. It can lead to high blood pressure. Drinking more than two drinks (15 ml) every day can raise your blood pressure. 15 ml of alcohol equals: \par • one 12-ounce bottle of beer \par • a half glass (5 ounces) of wine \par • 1 ounce (one shot) of 100 proof hard liquor\par \par \par 5 dm ---The following has been discussed:---\par -Targets for weight and HgA1c have been discussed with patient \par -FS goals have been reviewed with the patient in detail:\par AM <130 post meal<160-180\par -Diet and weight goals have been discussed with the patient in detail.\par -The importance of exercise in the treatment of diabetes has been discussed \par with the patient in detail.\par -Extensive dietary advice provided to patient and the need to avoid concentrated \par sweets/simple carbohydrates and to ensure to consume protein with each meal. \par -Patient instructed to limit carbohydrates to 60 gms per meal and 15 gms per \par snacks. \par -Patient to keep a blood sugar log to check fasting and before meals\par -Patient instructed on importance of daily feet inspection and to reports any \par open lesions to physician promptly\par \par 6 hyperlipidemia  Hyperlipedemia exercise weight loss low fat diet.  If you've been keeping an eye on your blood pressure and cholesterol levels, there's something else you might need to monitor: your triglycerides. Having a high level of triglycerides, a type of fat (lipid) in your blood, can increase your risk of heart disease.\par \par However, the same lifestyle choices that promote overall health can help lower your triglycerides, too.\par \par Triglycerides are a type of fat (lipid) found in your blood. When you eat, your body converts any calories it doesn't need to use right away into triglycerides. The triglycerides are stored in your fat cells. Later, hormones release triglycerides for energy between meals. If you regularly eat more calories than you burn, particularly "easy" calories like carbohydrates and fats, you may have high triglycerides (hypertriglyceridemia).\par \par A simple blood test can reveal whether your triglycerides fall into a healthy range.\par •Normal - Less than 150 milligrams per deciliter (mg/dL), or less than 1.7 millimoles per liter (mmol/L)\par •Borderline high - 150 to 199 mg/dL (1.8 to 2.2 mmol/L)\par •High - 200 to 499 mg/dL (2.3 to 5.6 mmol/L)\par •Very high - 500 mg/dL or above (5.7 mmol/L or above)\par \par Your doctor will usually check for high triglycerides as part of a cholesterol test (sometimes called a lipid panel or lipid profile). You'll have to fast for nine to 12 hours before blood can be drawn for an accurate triglyceride measurement.\par \par Triglycerides and cholesterol are separate types of lipids that circulate in your blood. Triglycerides store unused calories and provide your body with energy, and cholesterol is used to build cells and certain hormones. Because triglycerides and cholesterol can't dissolve in blood, they circulate throughout your body with the help of proteins that transport the lipids (lipoproteins).\par \par Although it's unclear how, high triglycerides may contribute to hardening of the arteries or thickening of the artery walls (atherosclerosis) - which increases the risk of stroke, heart attack and heart disease. Extremely high triglycerides - for example, levels above 1000 mg/dL (11.29 mmol/L) - can also cause acute pancreatitis.\par \par High triglycerides are often a sign of other conditions that increase the risk of heart disease and stroke as well, including obesity and metabolic syndrome - a cluster of conditions that includes too much fat around the waist, high blood pressure, high triglycerides, high blood sugar and abnormal cholesterol levels.\par \par Sometimes high triglycerides are a sign of poorly controlled type 2 diabetes, low levels of thyroid hormones (hypothyroidism), liver or kidney disease, or rare genetic conditions that affect how your body converts fat to energy. High triglycerides could also be a side effect of taking medications such as beta blockers, birth control pills, diuretics or steroids.\par \par Healthy lifestyle choices are key:\par •Lose weight. If you're overweight, losing 5 to 10 pounds can help lower your triglycerides. Motivate yourself by focusing on the benefits of losing weight, such as more energy and improved health.\par •Cut back on calories. Remember that extra calories are converted to triglycerides and stored as fat. Reducing your calories will reduce triglycerides.\par •Avoid sugary and refined foods. Simple carbohydrates, such as sugar and foods made with white flour, can increase triglycerides.\par •Choose healthier fats. Trade saturated fat found in meats for healthier monounsaturated fat found in plants, such as olive, peanut and canola oils. Substitute fish high in omega-3 fatty acids - such as mackerel and salmon - for red meat.\par •Limit how much alcohol you drink. Alcohol is high in calories and sugar and has a particularly potent effect on triglycerides. Even small amounts of alcohol can raise triglyceride levels.\par •Exercise regularly. Aim for at least 30 minutes of physical activity on most or all days of the week. Regular exercise can lower triglycerides and boost "good" cholesterol. Take a brisk daily walk, swim laps or join an exercise group. If you don't have time to exercise for 30 minutes, try squeezing it in 10 minutes at a time. Take a short walk, climb the stairs at work, or try some situps or pushups as you watch television.\par \par If healthy lifestyle changes aren't enough to control high triglycerides, your doctor might recommend some of the following:\par •Statins. Your doctor might prescribe these cholesterol-lowering drugs if you also have low high-density lipoprotein (HDL, or "good") cholesterol; high low-density lipoprotein (LDL, or "bad") cholesterol; or if you have a history of blocked arteries or diabetes. Examples include atorvastatin (Lipitor) and simvastatin (Zocor). Muscle pain is a potential side effect.\par •Fish oils. Also known as omega-3 fatty acids, fish oil supplements can help lower your triglycerides. High doses are needed, however, so this option is often reserved for people who have triglyceride levels over 500 mg/dL (5.7 mmol/L). \par •Fibrates. Fibrate medications, such as fenofibrate (TriCor, Fenoglide, others) and gemfibrozil (Lopid), also can lower your triglyceride levels. Fibrates seem to work best in people who have triglyceride levels over 500 mg/dL (5.7 mmol/L). Fibrates may increase the risk of side effects when taken together with statins.\par •Niacin. Niacin, sometimes called nicotinic acid, can lower your triglycerides and your "bad" cholesterol (low-density lipoprotein, or LDL, cholesterol). It's typically reserved for people who have triglyceride levels over 500 mg/dL (5.7 mmol/L). Don't take over-the-counter niacin without talking to your doctor first. Niacin can interact with other medications and can cause significant side effects.\par \par If your doctor prescribes medication to lower your triglycerides, take the medication as prescribed. And remember the significance of the healthy lifestyle changes you've made. Medications can help - but lifestyle matters, too\par \par acne use differins Medical therapies for acne target one or more of four key factors that promote the development of acne lesions: follicular hyperproliferation and abnormal desquamation, increased sebum production, Cutibacterium (formerly Propionibacterium) acnes proliferation, and inflammation (figure 1). (See "Pathogenesis, clinical manifestations, and diagnosis of acne vulgaris", section on 'Pathogenesis'.)\par \par These factors are targeted as follows [8]:\par \par ?Follicular hyperproliferation and abnormal desquamation\par \par \par •Topical retinoids\par \par •Oral retinoids\par \par •Azelaic acid\par \par •Salicylic acid\par \par •Hormonal therapies\par \par \par ?Increased sebum production\par \par \par •Oral isotretinoin\par \par •Hormonal therapies\par \par \par ?C. acnes proliferation \par \par \par •Benzoyl peroxide\par \par •Topical and oral antibiotics\par \par •Azelaic acid\par \par \par ?Inflammation\par \par \par •Oral isotretinoin\par \par •Oral tetracyclines\par \par •Topical retinoids\par \par •Azelaic acid\par \par \par In addition, the procedural therapies used as adjunctive therapies for acne target one or more contributory factors. The mechanisms of these interventions are reviewed separately. (See "Light-based, adjunctive, and other therapies for acne vulgaris".)\par \par Knowledge of mechanisms of action of acne therapies is combined with recognition of specific clinical features to determine the best approach to treatment. The following concepts guide the selection of therapy:\par \par ?Topical retinoids are beneficial for both comedonal (noninflammatory) (picture 1A-B) and inflammatory acne (picture 2A) and should be included in the initial management of most patients. Topical retinoids are effective in the treatment of comedonal acne due to their ability to normalize follicular hyperkeratosis and prevent formation of the microcomedo, the primary lesion of acne [9]. The efficacy of topical retinoids for inflammatory acne [10,11] may be due to a combination of intrinsic anti-inflammatory properties of topical retinoids and their ability to prevent the formation of microcomedones. Topical retinoids can be used as monotherapy in individuals with exclusively comedonal acne.\par \par \par ?Patients with an inflammatory component often benefit from antimicrobial therapies (eg, benzoyl peroxide or topical antibiotics). Antimicrobial agents reduce the number of proinflammatory C. acnes colonizing the skin. (See 'Topical antimicrobials' below.)\par \par \par ?Patients with moderate to severe inflammatory acne often warrant more aggressive treatment with oral antibiotics [8]. Antibiotics in the tetracycline class are most frequently used, and appear to have both antibacterial and anti-inflammatory properties. (See 'Oral antibiotics' below.)\par \par \par ?The use of benzoyl peroxide with topical or oral antibiotics decreases the emergence of antibiotic resistant bacteria. Therefore, use of benzoyl peroxide is recommended in patients receiving antibiotic therapy [7]. (See 'Oral antibiotics' below.)\par \par \par ?Androgens stimulate increased sebum production, which contributes to the formation of acne [9]. Hormonal therapy may benefit women with moderate to severe acne, even in the absence of a hyperandrogenic state. (See "Hormonal therapy for women with acne vulgaris".)\par \par \par ?Patients should be given realistic expectations regarding timelines for improvement. Improvement in acne is dependent upon both the prevention and resolution of acne papules, pustules, and nodules. At least two to three months of consistent adherence to a therapeutic regimen is often necessary prior to concluding that treatment is ineffective. Adjustments to the regimen also may be needed. \par \par \par ?Acne typically recurs over years, and maintenance therapy is an important component of acne management. (See 'Maintenance therapy' below.)\par \par \par \par APPROACH TO TREATMENT\par \par General approach — An example of an initial approach to acne based upon the principles above is outlined below. Additional therapeutic options are reviewed in a table derived from the 2016 acne treatment guidelines from the [employer] (table 2) [7]. \par \par ?Comedonal (noninflammatory) acne (picture 1A-B)\par \par \par •Topical retinoid (alternatives include azelaic acid and salicylic acid) \par \par \par ?Mild papulopustular and mixed (comedonal and papulopustular) acne (picture 2A-B)\par \par \par •Topical antimicrobial (eg, benzoyl peroxide alone or benzoyl peroxide +/- topical antibiotic) AND\par \par •Topical retinoid \par \par \par OR\par \par \par •Benzoyl peroxide AND topical antibiotic (for patients who cannot tolerate a retinoid or who require a simplified treatment regimen)\par \par \par ?Moderate papulopustular and mixed acne (picture 3)\par \par \par •Topical retinoid AND\par \par •Oral antibiotic AND\par \par •Topical benzoyl peroxide \par \par \par ?Severe acne (eg, nodular acne) (picture 4A-C)\par \par \par •Topical retinoid AND\par \par •Oral antibiotic AND\par \par •Topical benzoyl peroxide\par \par \par OR\par \par \par •Oral isotretinoin monotherapy (see 'Oral isotretinoin' below and "Oral isotretinoin therapy for acne vulgaris")\par \par \par Of note, oral hormonal therapy is an alternative to oral antibiotic therapy in postmenarchal females with moderate to severe acne (table 2). (See "Hormonal therapy for women with acne vulgaris".)\par \par Consistent adherence to acne therapy is critical for achieving clinical improvement. When recommending topical therapy, the clinician should design a regimen that is feasible for the patient. This should involve a discussion with the patient to ensure that each component of the treatment regimen is acceptable to the patient. We typically design regimens that require the patient to treat no more than once or twice daily. \par \par A sample regimen for a patient with mild inflammatory facial acne who is using a topical retinoid, topical benzoyl peroxide, and topical clindamycin is as follows:\par \par ?Morning: Wash face with a gentle facial cleanser. Apply a thin layer of a fixed-dose combination benzoyl peroxide/clindamycin gel to the entire face. (An alternative regimen could require the patient to wash the face with a benzoyl peroxide cleanser followed by application of a thin layer of topical clindamycin to the entire face.)\par \par \par ?Night: Wash face with a gentle facial cleanser. Apply a thin layer of the topical retinoid to the entire face.\par \par \par Some patients may prefer the use of fixed-dose combination products to simplify treatment (table 4). However, the cost of such treatments may be higher than single-active ingredient agents. (See 'Combination therapy' below.)\par \par The selection of the delivery system for topical acne medications also may help to improve the likelihood of adherence to treatment. The choice depends upon the patient's skin type (dry versus oily) and preference. Some gels have a drying effect; they may be preferred by patients with oily skin. Creams and lotions tend to be moisturizing. Solutions are drying but they cover large areas more easily than other preparations, and foams are easy to apply to hair-bearing areas. Pledgets are single-use absorbent pads impregnated with medication. They are convenient to use and facilitate the spreading of medication over large areas. \par \par Clinicians should be aware that further modifications to the vehicle of a drug may also affect characteristics such as efficacy, tolerability, or stability. As an example, the microsphere formulation of tretinoin consists of tretinoin contained in microscopic biodegradable spherical particles. As the microspheres are degraded, tretinoin is slowly and progressively delivered to the skin. This formulation is associated with improved drug stability and decreased irritation [12,13]. (See 'Topical retinoids' below.)\par \par Procedural therapies are sometimes used in conjunction with conventional medical therapy. These interventions are reviewed separately. (See "Light-based, adjunctive, and other therapies for acne vulgaris", section on 'Light/laser therapies' and "Light-based, adjunctive, and other therapies for acne vulgaris", section on 'Adjunctive therapies'.)\par \par Truncal acne — Although the treatment of both facial and truncal acne can be approached similarly, a challenge for the treatment of truncal acne is the difficulty associated with applying topical treatments to the entire affected area. Given the difficulty that patients may h\par \par 8  av crossing refer to ophthalmologist

## 2021-07-22 NOTE — HISTORY OF PRESENT ILLNESS
[FreeTextEntry1] : cpe  [de-identified] : Mr. PALOMO is a 40 year old male with a history of originally from [facility], DM, HTN, elevated liver enzymes, HLD, hernia disc, AJ, OW, polycythemia, PENELOPE, low vitamin D, Abnormal PFTs who presents for cpe. Pt went to surgery for a boil at back of his neck and it healed. Pt had sleep apnea and was given APAP machine. Pt current c/o constipation for 4 day, - started 2 weeks ago. He has slight abd pain .  He is drinking water but was  not enough and increased it but it has not helped.,  he has been eating fruits and vegetables .  he is exercising 5 days a week.  he feels slight distended in his abdomen.  No nausea or vomiting or fever or chills.  No prior surgeries.  he is passing gas.   His blood sugars are in 90s-120s  No increased thirst or urination.  he checks his feet daily.    he states he has stopped the  admelog since his blood sugars would go too low.  He is only taking night time 20 units.

## 2021-08-01 LAB
25(OH)D3 SERPL-MCNC: 29.8 NG/ML
ANION GAP SERPL CALC-SCNC: 15 MMOL/L
APPEARANCE: CLEAR
BACTERIA: NEGATIVE
BILIRUBIN URINE: NEGATIVE
BLOOD URINE: NEGATIVE
BUN SERPL-MCNC: 21 MG/DL
CALCIUM SERPL-MCNC: 9.9 MG/DL
CHLORIDE SERPL-SCNC: 102 MMOL/L
CHOLEST SERPL-MCNC: 183 MG/DL
CO2 SERPL-SCNC: 22 MMOL/L
COLOR: NORMAL
CREAT SERPL-MCNC: 0.93 MG/DL
ESTIMATED AVERAGE GLUCOSE: 123 MG/DL
FRUCTOSAMINE SERPL-MCNC: 245 UMOL/L
GLUCOSE QUALITATIVE U: NEGATIVE
GLUCOSE SERPL-MCNC: 99 MG/DL
HBA1C MFR BLD HPLC: 5.9 %
HDLC SERPL-MCNC: 43 MG/DL
HYALINE CASTS: 0 /LPF
KETONES URINE: NEGATIVE
LDLC SERPL CALC-MCNC: 127 MG/DL
LEUKOCYTE ESTERASE URINE: NEGATIVE
MICROSCOPIC-UA: NORMAL
NITRITE URINE: NEGATIVE
NONHDLC SERPL-MCNC: 140 MG/DL
PH URINE: 6.5
POTASSIUM SERPL-SCNC: 4.5 MMOL/L
PROTEIN URINE: NORMAL
RED BLOOD CELLS URINE: 4 /HPF
SODIUM SERPL-SCNC: 139 MMOL/L
SPECIFIC GRAVITY URINE: 1.03
SQUAMOUS EPITHELIAL CELLS: 0 /HPF
TRIGL SERPL-MCNC: 64 MG/DL
UROBILINOGEN URINE: NORMAL
WHITE BLOOD CELLS URINE: 0 /HPF

## 2021-09-17 LAB — SARS-COV-2 N GENE NPH QL NAA+PROBE: NOT DETECTED

## 2021-09-20 ENCOUNTER — APPOINTMENT (OUTPATIENT)
Dept: PULMONOLOGY | Facility: CLINIC | Age: 41
End: 2021-09-20
Payer: MEDICAID

## 2021-09-20 ENCOUNTER — NON-APPOINTMENT (OUTPATIENT)
Age: 41
End: 2021-09-20

## 2021-09-20 VITALS
SYSTOLIC BLOOD PRESSURE: 124 MMHG | WEIGHT: 184 LBS | OXYGEN SATURATION: 98 % | DIASTOLIC BLOOD PRESSURE: 78 MMHG | HEART RATE: 63 BPM | TEMPERATURE: 96.8 F | BODY MASS INDEX: 28.88 KG/M2 | HEIGHT: 67 IN | RESPIRATION RATE: 16 BRPM

## 2021-09-20 DIAGNOSIS — R94.2 ABNORMAL RESULTS OF PULMONARY FUNCTION STUDIES: ICD-10-CM

## 2021-09-20 PROCEDURE — 95012 NITRIC OXIDE EXP GAS DETER: CPT

## 2021-09-20 PROCEDURE — ZZZZZ: CPT

## 2021-09-20 PROCEDURE — 99214 OFFICE O/P EST MOD 30 MIN: CPT | Mod: 25

## 2021-09-20 PROCEDURE — 94010 BREATHING CAPACITY TEST: CPT

## 2021-09-20 NOTE — REASON FOR VISIT
[Follow-Up] : a follow-up visit [TextBox_44] : PENELOPE, Abnormal PFTs, GERD, low vitamin D, Polycythemia \par \par \par

## 2021-09-20 NOTE — PROCEDURE
[FreeTextEntry1] : Sleep study (5.10.21) revealed sleep apnea with an AHI/YOLY of 1.2, snore index of % and a low oxygen saturation of 80%\par \par PFT revealed normal flows, with a FEV1 of 4.31 L, which is 111% of predicted, with a normal flow volume loop\par \par Feno was 10; a normal value being less than 25. Fractional exhaled nitric oxide (FENO) is regarded as a simple, noninvasive method for assessing eosinophilic airway inflammation. Produced by a variety of cells within the lung, nitric oxide (NO) concentrations are generally low in healthy individuals. However, high concentrations of NO appear to be involved in nonspecific host defense mechanisms and chronic inflammatory  diseases such as asthma. The American Thoracic Society (ATS) therefore recommended using FENO to aid in the diagnosis and monitoring of eosinophilic airway inflammation and asthma, and for identifying steroid responsive individuals whose chronic respiratory symptoms may be caused by airway inflammation \par  \par -Images and procedures reviewed in detail and discussed with patient. \par

## 2021-09-20 NOTE — ADDENDUM
[FreeTextEntry1] : Documented by Frankie Barnard acting as a scribe for Dr. Sg Claudio on (09/20/2021).\par \par All medical record entries made by the Scribe were at my, Dr. Sg Claudio's, direction and personally dictated by me on (09/20/2021). I have reviewed the chart and agree that the record accurately reflects my personal performance of the history, physical exam, assessment and plan. I have also personally directed, reviewed, and agree with the discharge instructions.\par

## 2021-09-20 NOTE — HISTORY OF PRESENT ILLNESS
[TextBox_4] : Mr. PALOMO is a 40 year old male presenting to the office today for initial pulmonary evaluation. His chief complaint is\par -he note sfeeling good \par -he notes constipation \par -he notes energy levels are 8/10 \par -he notes exercising with cardio and weights \par -no new medications, vitamins, or supplements \par -he notes his sleep improved once he started losing weight \par -he notes losing 20+ lbs with exercise and dieting \par -he notes feeling like he is back to himself\par -he notes doing the keto diet (successful) \par patient denies any headaches, nausea, vomiting, fever, chills, sweats, chest pain, chest pressure, palpitations, coughing, wheezing, fatigue, diarrhea, dysphagia, myalgias, dizziness, leg swelling, leg pain, itchy eyes, itchy ears, heartburn, reflux or sour taste in the mouth

## 2021-09-20 NOTE — ASSESSMENT
[FreeTextEntry1] : Mr. PALOMO is a 40 year old male with a history of originally from [facility], DM, HTN, elevated liver enzymes, HLD, hernia disc, AJ, OW, polycythemia, prior PENELOPE, low vitamin D, Abnormal PFTs who now comes to the office for an initial pulmonary evaluation. Sleep Evaluation/ PENELOPE -resolved with weight loss \par \par \par problem 1: PENELOPE (elevated Mallampati Class, Neck Size over 16, Snore, Abnormal Inspiratory Limb on past PFTs, Polycythemia, blood sugar disturbance) \par -complete split night sleep study \par Sleep apnea is associated with adverse clinical consequences which can affect most organ systems. Cardiovascular disease risk includes arrhythmias, atrial fibrillation, hypertension, coronary artery disease, and stroke. Metabolic disorders include diabetes type 2, non-alcoholic fatty liver disease. Mood disorder especially depression; and cognitive decline especially in the elderly. Associations with chronic reflux/Roth’s esophagus some but not all inclusive. \par -Reasons include arousal consistent with hypopnea; respiratory events most prominent in REM sleep or supine position; therefore sleep staging and body position are important for accurate diagnosis and estimation of AHI. \par \par Treatment options discussed including CPAP/BiPAP machine, oral appliance, ProVent therapy, Oxy-Aid by [employer], new technologies, or positional sleep.Recommended use of the CPAP machine for moderate (AHI >15), moderate to severe (AHI 15-30) and severe patients (AHI > 30). Recommended weight loss which can reduce AHI especially in weight loss of greater than 5% of BMI. Positional sleep is recommended in those with low AHI, low-moderate BMI, and younger age. For severe sleep apnea, the hypoglossal nerve stimulator was recommended as well. \par \par Problem 2: Abnormal PFTs (Abnormal Inspiratory Limb)\par -due to body habitus\par \par Problem 3: GERD (quiet)\par -diet controlled\par -Rule of 2s: avoid eating too much, eating too late, eating too spicy, eating two hours before bed.\par -Things to avoid including overeating, spicy foods, tight clothing, eating within three hours of bed, this list is not all inclusive. \par -For treatment of reflux, possible options discussed including diet control, H2 blockers, PPIs, as well as coating motility agents discussed as treatment options. Timing of meals and proximity of last meal to sleep were discussed. If symptoms persist, a formal gastrointestinal evaluation is needed.\par \par Problem 4: Low Vitamin D\par -on Vitamin D supplement\par Has been associated with asthma exacerbations and increased allergic symptoms. The goal based on recent information is maintaining levels between 50-70 and low normal is 30. Recommended 50,000 units every two weeks to once a month depending on the level.\par \par  Problem 5: Polycythemia\par -likely related to hypoxia of Sleep apnea\par -complete MAIR 2 Study ([place])\par \par problem 6: cardiac disease\par -recommended to continue to follow up with Cardiologist if needed\par \par problem 7: out of shape / overweight (keto diet)\par -complete Functional Medicine Evaluation with Dr. Navarrete \par -Weight loss, exercise, and diet control were discussed and are highly encouraged. Treatment options were given such as, aqua therapy, and contacting a nutritionist. Recommended to use the elliptical, stationary bike, less use of treadmill. Mindful eating was explained to the patient Obesity is associated with worsening asthma, shortness of breath, and potential for cardiac disease, diabetes, and other underlying medical conditions\par \par problem 8: health maintenance \par -recommended yearly flu shot after October 15\par -recommended strep pneumonia vaccines: Prevnar-13 vaccine, followed by Pneumo vaccine 23 one year following after 65\par -recommended early intervention for Upper Respiratory Infections (URIs)\par -recommended regular osteoporosis evaluations\par -recommended early dermatological evaluations\par -recommended after the age of 50 to the age of 70, colonoscopy every 5 years\par \par F/U in 6-8 weeks.\par He is encouraged to call with any changes, concerns, or questions\par

## 2021-11-04 ENCOUNTER — TRANSCRIPTION ENCOUNTER (OUTPATIENT)
Age: 41
End: 2021-11-04

## 2021-11-12 NOTE — PATIENT PROFILE ADULT - DOES PATIENT HAVE ADVANCE DIRECTIVE
Prior to immunization administration, verified patients identity using patient s name and date of birth. Please see Immunization Activity for additional information.     Screening Questionnaire for Adult Immunization    Are you sick today?   No   Do you have allergies to medications, food, a vaccine component or latex?   No   Have you ever had a serious reaction after receiving a vaccination?   No   Do you have a long-term health problem with heart, lung, kidney, or metabolic disease (e.g., diabetes), asthma, a blood disorder, no spleen, complement component deficiency, a cochlear implant, or a spinal fluid leak?  Are you on long-term aspirin therapy?   No   Do you have cancer, leukemia, HIV/AIDS, or any other immune system problem?   No   Do you have a parent, brother, or sister with an immune system problem?   No   In the past 3 months, have you taken medications that affect  your immune system, such as prednisone, other steroids, or anticancer drugs; drugs for the treatment of rheumatoid arthritis, Crohn s disease, or psoriasis; or have you had radiation treatments?   No   Have you had a seizure, or a brain or other nervous system problem?   No   During the past year, have you received a transfusion of blood or blood    products, or been given immune (gamma) globulin or antiviral drug?   No   For women: Are you pregnant or is there a chance you could become       pregnant during the next month?   No   Have you received any vaccinations in the past 4 weeks?   No     Immunization questionnaire answers were all negative.        Per orders of Dr. Frank, injection of Fluzone given by Margo Dykes. Patient instructed to remain in clinic for 15 minutes afterwards, and to report any adverse reaction to me immediately.       Screening performed by Margo Dykes on 11/12/2021 at 8:42 AM.     no

## 2021-12-09 ENCOUNTER — APPOINTMENT (OUTPATIENT)
Dept: INTERNAL MEDICINE | Facility: CLINIC | Age: 41
End: 2021-12-09
Payer: MEDICAID

## 2021-12-09 VITALS
OXYGEN SATURATION: 98 % | BODY MASS INDEX: 31.23 KG/M2 | WEIGHT: 199 LBS | SYSTOLIC BLOOD PRESSURE: 130 MMHG | HEART RATE: 68 BPM | RESPIRATION RATE: 16 BRPM | TEMPERATURE: 97.3 F | DIASTOLIC BLOOD PRESSURE: 79 MMHG | HEIGHT: 67 IN

## 2021-12-09 DIAGNOSIS — Z23 ENCOUNTER FOR IMMUNIZATION: ICD-10-CM

## 2021-12-09 PROCEDURE — 90686 IIV4 VACC NO PRSV 0.5 ML IM: CPT

## 2021-12-09 PROCEDURE — G0008: CPT

## 2021-12-09 PROCEDURE — 99213 OFFICE O/P EST LOW 20 MIN: CPT | Mod: 25

## 2021-12-09 NOTE — ASSESSMENT
[FreeTextEntry1] : 1 DM  ---The following has been discussed:---\par -Targets for weight and HgA1c have been discussed with patient \par -FS goals have been reviewed with the patient in detail:\par AM <130 post meal<160-180\par -Diet and weight goals have been discussed with the patient in detail.\par -The importance of exercise in the treatment of diabetes has been discussed \par with the patient in detail.\par -Extensive dietary advice provided to patient and the need to avoid concentrated \par sweets/simple carbohydrates and to ensure to consume protein with each meal. \par -Patient instructed to limit carbohydrates to 60 gms per meal and 15 gms per \par snacks. \par -Patient to keep a blood sugar log to check fasting and before meals\par -Patient instructed on importance of daily feet inspection and to reports any \par open lesions to physician promptly\par  2  hypertriglyceridemia  -  Hyperlipedemia exercise weight loss low fat diet.  If you've been keeping an eye on your blood pressure and cholesterol levels, there's something else you might need to monitor: your triglycerides. Having a high level of triglycerides, a type of fat (lipid) in your blood, can increase your risk of heart disease.\par \par However, the same lifestyle choices that promote overall health can help lower your triglycerides, too.\par \par Triglycerides are a type of fat (lipid) found in your blood. When you eat, your body converts any calories it doesn't need to use right away into triglycerides. The triglycerides are stored in your fat cells. Later, hormones release triglycerides for energy between meals. If you regularly eat more calories than you burn, particularly "easy" calories like carbohydrates and fats, you may have high triglycerides (hypertriglyceridemia).\par \par A simple blood test can reveal whether your triglycerides fall into a healthy range.\par •Normal - Less than 150 milligrams per deciliter (mg/dL), or less than 1.7 millimoles per liter (mmol/L)\par •Borderline high - 150 to 199 mg/dL (1.8 to 2.2 mmol/L)\par •High - 200 to 499 mg/dL (2.3 to 5.6 mmol/L)\par •Very high - 500 mg/dL or above (5.7 mmol/L or above)\par \par Your doctor will usually check for high triglycerides as part of a cholesterol test (sometimes called a lipid panel or lipid profile). You'll have to fast for nine to 12 hours before blood can be drawn for an accurate triglyceride measurement.\par \par Triglycerides and cholesterol are separate types of lipids that circulate in your blood. Triglycerides store unused calories and provide your body with energy, and cholesterol is used to build cells and certain hormones. Because triglycerides and cholesterol can't dissolve in blood, they circulate throughout your body with the help of proteins that transport the lipids (lipoproteins).\par \par Although it's unclear how, high triglycerides may contribute to hardening of the arteries or thickening of the artery walls (atherosclerosis) - which increases the risk of stroke, heart attack and heart disease. Extremely high triglycerides - for example, levels above 1000 mg/dL (11.29 mmol/L) - can also cause acute pancreatitis.\par \par High triglycerides are often a sign of other conditions that increase the risk of heart disease and stroke as well, including obesity and metabolic syndrome - a cluster of conditions that includes too much fat around the waist, high blood pressure, high triglycerides, high blood sugar and abnormal cholesterol levels.\par \par Sometimes high triglycerides are a sign of poorly controlled type 2 diabetes, low levels of thyroid hormones (hypothyroidism), liver or kidney disease, or rare genetic conditions that affect how your body converts fat to energy. High triglycerides could also be a side effect of taking medications such as beta blockers, birth control pills, diuretics or steroids.\par \par Healthy lifestyle choices are key:\par •Lose weight. If you're overweight, losing 5 to 10 pounds can help lower your triglycerides. Motivate yourself by focusing on the benefits of losing weight, such as more energy and improved health.\par •Cut back on calories. Remember that extra calories are converted to triglycerides and stored as fat. Reducing your calories will reduce triglycerides.\par •Avoid sugary and refined foods. Simple carbohydrates, such as sugar and foods made with white flour, can increase triglycerides.\par •Choose healthier fats. Trade saturated fat found in meats for healthier monounsaturated fat found in plants, such as olive, peanut and canola oils. Substitute fish high in omega-3 fatty acids - such as mackerel and salmon - for red meat.\par •Limit how much alcohol you drink. Alcohol is high in calories and sugar and has a particularly potent effect on triglycerides. Even small amounts of alcohol can raise triglyceride levels.\par •Exercise regularly. Aim for at least 30 minutes of physical activity on most or all days of the week. Regular exercise can lower triglycerides and boost "good" cholesterol. Take a brisk daily walk, swim laps or join an exercise group. If you don't have time to exercise for 30 minutes, try squeezing it in 10 minutes at a time. Take a short walk, climb the stairs at work, or try some situps or pushups as you watch television.\par \par If healthy lifestyle changes aren't enough to control high triglycerides, your doctor might recommend some of the following:\par •Statins. Your doctor might prescribe these cholesterol-lowering drugs if you also have low high-density lipoprotein (HDL, or "good") cholesterol; high low-density lipoprotein (LDL, or "bad") cholesterol; or if you have a history of blocked arteries or diabetes. Examples include atorvastatin (Lipitor) and simvastatin (Zocor). Muscle pain is a potential side effect.\par •Fish oils. Also known as omega-3 fatty acids, fish oil supplements can help lower your triglycerides. High doses are needed, however, so this option is often reserved for people who have triglyceride levels over 500 mg/dL (5.7 mmol/L). \par •Fibrates. Fibrate medications, such as fenofibrate (TriCor, Fenoglide, others) and gemfibrozil (Lopid), also can lower your triglyceride levels. Fibrates seem to work best in people who have triglyceride levels over 500 mg/dL (5.7 mmol/L). Fibrates may increase the risk of side effects when taken together with statins.\par •Niacin. Niacin, sometimes called nicotinic acid, can lower your triglycerides and your "bad" cholesterol (low-density lipoprotein, or LDL, cholesterol). It's typically reserved for people who have triglyceride levels over 500 mg/dL (5.7 mmol/L). Don't take over-the-counter niacin without talking to your doctor first. Niacin can interact with other medications and can cause significant side effects.\par \par If your doctor prescribes medication to lower your triglycerides, take the medication as prescribed. And remember the significance of the healthy lifestyle changes you've made. Medications can help - but lifestyle matters, too\par \par 3 flu vaccine  needed he will receive it today and discussed side effects.   Influenza Virus Vaccine (Inactivated) (in floo EN za GAMA granados SEEN, in ak william FISHER satya) \par \par COMMON USES:  It is used to prevent the flu. \par \par HOW TO USE THIS MEDICINE:  HOW IS THIS DRUG BEST TAKEN? Use this drug as ordered by your doctor. Read all information given to you. Follow all instructions closely. It is given as a shot into a muscle. HOW DO I STORE AND/OR THROW OUT THIS DRUG? If you need to store this drug at home, talk with your doctor, nurse, or pharmacist about how to store it. WHAT DO I DO IF I MISS A DOSE? Call your doctor to find out what to do. \par \par CAUTIONS:  Tell all of your health care providers that you take this drug. This includes your doctors, nurses, pharmacists, and dentists. If you have a latex allergy, talk with your doctor. If you are allergic to eggs, talk with the doctor. Like all vaccines, this vaccine may not fully protect all people who get it. If you have questions, talk with the doctor. This drug is a vaccine with a virus that is not active. It cannot cause the disease. This drug is not a cure for the flu. It must be given before you are exposed to the flu in order to work. Most of the time, it takes a few weeks for this drug to work. This drug only protects you for 1 flu season. You will need to get the flu vaccine each year. If you have a weak immune system or take drugs that weaken the immune system, talk with your doctor. This vaccine may not work as well. Not all brands of vaccines are for all children. Talk with your child's doctor. Some children may need to have more than 1 dose of this vaccine. Talk with your child's doctor. Some children have had a fever and seizures caused by fevers with some flu vaccines. Most of the time, this happened in children younger than 5 years of age. Fever has also been seen in children 5 to younger than 9 years of age. Talk with your child's doctor. Tell your doctor if you are pregnant, plan on getting pregnant, or are breast-feeding. You will need to talk about the benefits and risks to you and the baby. \par \par POSSIBLE SIDE EFFECTS:  WHAT ARE SOME SIDE EFFECTS THAT I NEED TO CALL MY DOCTOR ABOUT RIGHT AWAY? WARNING/CAUTION: Even though it may be rare, some people may have very bad and sometimes deadly side effects when taking a drug. Tell your doctor or get medical help right away if you have any of the following signs or symptoms that may be related to a very bad side effect: Signs of an allergic reaction, like rash; hives; itching; red, swollen, blistered, or peeling skin with or without fever; wheezing; tightness in the chest or throat; trouble breathing, swallowing, or talking; unusual hoarseness; or swelling of the mouth, face, lips, tongue, or throat. A burning, numbness, or tingling feeling that is not normal. Not able to move face muscles as much. Trouble controlling body movements. Very bad dizziness or passing out. Muscle weakness. Seizures. Change in eyesight. WHAT ARE SOME OTHER SIDE EFFECTS OF THIS DRUG? All drugs may cause side effects. However, many people have no side effects or only have minor side effects. Call your doctor or get medical help if any of these side effects or any other side effects bother you or do not go away: For all patients taking this drug: Pain, redness, swelling, or other reaction where the injection was given. Headache. Muscle or joint pain. Feeling tired or weak. Chills. Young children: Mild fever. Upset stomach or throwing up. Stomach pain or diarrhea. Not hungry. Feeling sleepy. Feeling fussy. Crying that is not normal. These are not all of the side effects that may occur. If you have questions about side effects, call your doctor. Call your doctor for medical advice about side effects. Report side effects to the FDA/CDC Vaccine Adverse Event Reporting System (VAERS) at https://vaers.hhs.gov/reportevent.html or by calling 1-703.347.8187. \par \par BEFORE USING THIS MEDICINE:  WHAT DO I NEED TO TELL MY DOCTOR BEFORE I TAKE THIS DRUG? TELL YOUR DOCTOR: If you are allergic to this drug; any part of this drug; or any other drugs, foods, or substances. Tell your doctor about the allergy and what signs you had. This drug may interact with other drugs or health problems. Tell your doctor and pharmacist about all of your drugs (prescription or OTC, natural products, vitamins) and health problems. You must check to make sure that it is safe for you to take this drug with all of your drugs and health problems. Do not start, stop, or change the dose of any drug without checking with your doctor. \par \par OVERDOSE:  If you think there has been an overdose, call your poison control center or get medical care right away. Be ready to tell or show what was taken, how much, and when it happened. \par \par ADDITIONAL INFORMATION:  If your symptoms or health problems do not get better or if they become worse, call your doctor. Do not share your drugs with others and do not take anyone else's drugs. Keep all drugs in a safe place. Keep all drugs out of the reach of children and pets. Throw away unused or  drugs. Do not flush down a toilet or pour down a drain unless you are told to do so. Check with your pharmacist if you have questions about the best way to throw out drugs. There may be drug take-back programs in your area. Some drugs may have another patient information leaflet. Check with your pharmacist. If you have any questions about this drug, please talk with your doctor, nurse, pharmacist, or other health care provider. \par \par Copyright 202 "[employer] Inc[employer] All Rights Reserved.     \par \par 4 vit d -  -on Vitamin D supplement\par Has been associated with asthma exacerbations and increased allergic symptoms. The goal based on recent information is maintaining levels between 50-70 and low normal is 30. Recommended 50,000 units every two weeks to once a month depending on the level.\par \par \par

## 2021-12-09 NOTE — HEALTH RISK ASSESSMENT
[] : No [No] : In the past 12 months have you used drugs other than those required for medical reasons? No [No falls in past year] : Patient reported no falls in the past year [de-identified] : none  [de-identified] : low fat and low sugar

## 2021-12-09 NOTE — HISTORY OF PRESENT ILLNESS
[FreeTextEntry1] : diabetes ,  [de-identified] : Pt states he has been feeling well .  he states his blood sugars  are in 90s and 100s.  he is taking all his medications.  His last labs revealed triglycerides 64 and hgbA1c 5.9   he doesn’t  have  increased urination or thirst .  he denies -denies any headaches, nausea, vomiting, fever, chills, sweats, chest pain, chest pressure, diarrhea, constipation, dysphagia, sour taste in the mouth, dizziness, leg swelling, leg pain, myalgias, arthralgias, itchy eyes, itchy ears, heartburn, or reflux.\par  He has gained weight  13 lbs.  \par \par

## 2021-12-09 NOTE — PHYSICAL EXAM
[PERRL] : pupils equal round and reactive to light [Normal Oropharynx] : the oropharynx was normal [Supple] : supple [Normal Posterior Cervical Nodes] : no posterior cervical lymphadenopathy [Normal Anterior Cervical Nodes] : no anterior cervical lymphadenopathy [Normal] : affect was normal and insight and judgment were intact [Comprehensive Foot Exam Normal] : Right and left foot were examined and both feet are normal. No ulcers in either foot. Toes are normal and with full ROM.  Normal tactile sensation with monofilament testing throughout both feet

## 2021-12-15 LAB
25(OH)D3 SERPL-MCNC: 29.6 NG/ML
ALBUMIN SERPL ELPH-MCNC: 5.1 G/DL
ALP BLD-CCNC: 80 U/L
ALT SERPL-CCNC: 19 U/L
ANION GAP SERPL CALC-SCNC: 16 MMOL/L
APPEARANCE: CLEAR
AST SERPL-CCNC: 28 U/L
BACTERIA: NEGATIVE
BILIRUB SERPL-MCNC: 0.4 MG/DL
BILIRUBIN URINE: NEGATIVE
BLOOD URINE: NEGATIVE
BUN SERPL-MCNC: 16 MG/DL
CALCIUM SERPL-MCNC: 10 MG/DL
CHLORIDE SERPL-SCNC: 102 MMOL/L
CHOLEST SERPL-MCNC: 203 MG/DL
CO2 SERPL-SCNC: 23 MMOL/L
COLOR: YELLOW
CREAT SERPL-MCNC: 0.95 MG/DL
CREAT SPEC-SCNC: 175 MG/DL
ESTIMATED AVERAGE GLUCOSE: 137 MG/DL
FRUCTOSAMINE SERPL-MCNC: 285 UMOL/L
GLUCOSE QUALITATIVE U: NEGATIVE
GLUCOSE SERPL-MCNC: 134 MG/DL
HBA1C MFR BLD HPLC: 6.4 %
HDLC SERPL-MCNC: 37 MG/DL
HYALINE CASTS: 1 /LPF
KETONES URINE: NEGATIVE
LDLC SERPL CALC-MCNC: 139 MG/DL
LEUKOCYTE ESTERASE URINE: NEGATIVE
MICROALBUMIN 24H UR DL<=1MG/L-MCNC: 2.4 MG/DL
MICROALBUMIN/CREAT 24H UR-RTO: 13 MG/G
MICROSCOPIC-UA: NORMAL
NITRITE URINE: NEGATIVE
NONHDLC SERPL-MCNC: 166 MG/DL
PH URINE: 6.5
POTASSIUM SERPL-SCNC: 4.1 MMOL/L
PROT SERPL-MCNC: 7.7 G/DL
PROTEIN URINE: NORMAL
RED BLOOD CELLS URINE: 3 /HPF
SODIUM SERPL-SCNC: 141 MMOL/L
SPECIFIC GRAVITY URINE: 1.03
SQUAMOUS EPITHELIAL CELLS: 0 /HPF
TRIGL SERPL-MCNC: 134 MG/DL
UROBILINOGEN URINE: NORMAL
WHITE BLOOD CELLS URINE: 1 /HPF

## 2021-12-16 ENCOUNTER — TRANSCRIPTION ENCOUNTER (OUTPATIENT)
Age: 41
End: 2021-12-16

## 2021-12-30 RX ORDER — ADAPALENE 1 MG/G
0.1 GEL TOPICAL DAILY
Qty: 1 | Refills: 0 | Status: ACTIVE | COMMUNITY
Start: 2020-09-17 | End: 1900-01-01

## 2022-01-12 ENCOUNTER — APPOINTMENT (OUTPATIENT)
Dept: UROLOGY | Facility: CLINIC | Age: 42
End: 2022-01-12
Payer: MEDICAID

## 2022-01-12 PROCEDURE — 99204 OFFICE O/P NEW MOD 45 MIN: CPT

## 2022-01-12 NOTE — HISTORY OF PRESENT ILLNESS
[Hematuria - Microscopic] : microscopic hematuria [None] : None [FreeTextEntry1] : Mr. Story is a very pleasant 41 year old man here today for microscopic hematuria.\par He reports he saw his PCP, Dr. Dumont on 12/14/21 for routine physical, urine was collected.\par UA showed 3 RBCs.\par Prior UA on 6/29/21 showed 4 RBCs.\par He denies any gross hematuria, dysuria, or increased frequency.\par Denies nocturia.\par Reports a "normal" urinary stream.\par Denies past or recent past trauma to the lower abdomen/genital area.\par Denies personal or familial history of kidney stones.\par Denies familial history of urological cancers. \par Denies history of smoking.

## 2022-01-12 NOTE — ASSESSMENT
[FreeTextEntry1] : Mr. Story is a very pleasant 41 year old man here today for microscopic hematuria.\par UA reviewed demonstrating 3 RBCs.\par Prior UA on 6/29/21 showed 4 RBCs.\par Repeat UA today.\par UC today to rule out infectious causes.\par Follow up renal US.\par -We discussed AUA guidelines regarding risk stratification for microscopic hematuria\par -Extensive discussion of the potential etiologies of hematuria, as well as the need to complete full work up for evaluation of cancer or other  sources of hematuria.  Patient understands and wishes to proceed.

## 2022-01-13 LAB
APPEARANCE: CLEAR
BACTERIA: NEGATIVE
BILIRUBIN URINE: NEGATIVE
BLOOD URINE: NEGATIVE
COLOR: YELLOW
GLUCOSE QUALITATIVE U: NEGATIVE
HYALINE CASTS: 0 /LPF
KETONES URINE: NEGATIVE
LEUKOCYTE ESTERASE URINE: NEGATIVE
MICROSCOPIC-UA: NORMAL
NITRITE URINE: NEGATIVE
PH URINE: 6
PROTEIN URINE: NORMAL
RED BLOOD CELLS URINE: 1 /HPF
SPECIFIC GRAVITY URINE: 1.03
SQUAMOUS EPITHELIAL CELLS: 0 /HPF
UROBILINOGEN URINE: NORMAL
WHITE BLOOD CELLS URINE: 0 /HPF

## 2022-01-14 LAB — BACTERIA UR CULT: NORMAL

## 2022-02-16 ENCOUNTER — APPOINTMENT (OUTPATIENT)
Dept: UROLOGY | Facility: CLINIC | Age: 42
End: 2022-02-16

## 2022-02-16 ENCOUNTER — APPOINTMENT (OUTPATIENT)
Dept: UROLOGY | Facility: CLINIC | Age: 42
End: 2022-02-16
Payer: MEDICAID

## 2022-02-16 DIAGNOSIS — D17.71 BENIGN LIPOMATOUS NEOPLASM OF KIDNEY: ICD-10-CM

## 2022-02-16 PROCEDURE — 76775 US EXAM ABDO BACK WALL LIM: CPT

## 2022-02-16 PROCEDURE — 99214 OFFICE O/P EST MOD 30 MIN: CPT | Mod: 25

## 2022-02-16 NOTE — PHYSICAL EXAM
[General Appearance - Well Developed] : well developed [General Appearance - Well Nourished] : well nourished [Normal Appearance] : normal appearance [Well Groomed] : well groomed [General Appearance - In No Acute Distress] : no acute distress [Abdomen Soft] : soft [Costovertebral Angle Tenderness] : no ~M costovertebral angle tenderness [Abdomen Tenderness] : non-tender [Urethral Meatus] : meatus normal [Urinary Bladder Findings] : the bladder was normal on palpation [Scrotum] : the scrotum was normal [Testes Mass (___cm)] : there were no testicular masses [Edema] : no peripheral edema [] : no respiratory distress [Exaggerated Use Of Accessory Muscles For Inspiration] : no accessory muscle use [Respiration, Rhythm And Depth] : normal respiratory rhythm and effort [Oriented To Time, Place, And Person] : oriented to person, place, and time [Affect] : the affect was normal [Mood] : the mood was normal [Not Anxious] : not anxious [Normal Station and Gait] : the gait and station were normal for the patient's age [No Focal Deficits] : no focal deficits [No Palpable Adenopathy] : no palpable adenopathy

## 2022-02-16 NOTE — HISTORY OF PRESENT ILLNESS
[FreeTextEntry1] : Very pleasant 41-year-old gentleman who presents for follow-up of microscopic hematuria.  He feels well.  He denies dysuria.  No gross hematuria.  No flank pain or suprapubic pain.  Prior urinalyses demonstrated 3 and 4 red blood cells per high-power field.  He underwent a renal ultrasound today which demonstrates bilateral small nonobstructing intrarenal stones measuring up to 3 mm.  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  He reports that this has worsened over the last 3 to 4 months.  He reports that his erections are approximately 5-6 out of 10 in rigidity and tumescence.  He is interested in trying something for this.

## 2022-02-16 NOTE — ASSESSMENT
[FreeTextEntry1] : Very pleasant 41-year-old gentleman who presents for follow-up of microscopic hematuria, new findings of kidney stones, AML, erectile dysfunction\par -Prior urinalysis demonstrated 3 red blood cells per high-power field.  Prior to this urinalysis demonstrated 4 red blood cells per high-powered field\par -Repeat urinalysis collected in the office on 1/13/2022 demonstrated 1 red blood cell per high-powered field\par -Urine culture negative\par -Renal ultrasound today demonstrates bilateral small kidney stones measuring up to 3 mm as well as a 1.7 cm angiomyolipoma\par -We discussed the usual benign nature of angiomyolipomas.  We discussed risk factors associated with angiomyolipomas\par -We discussed the likelihood of spontaneous stone passage of a 3 mm kidney stone.  We discussed treatment options for a 3 mm kidney stone, including surgical options, observation\par -We discussed general stone prevention strategies\par -At this time patient would like to observe\par -Repeat renal ultrasound in 6 months\par -We discussed option to perform a cystoscopy.  We discussed the risks and benefits of a cystoscopy.  We discussed that he is classified as having a low risk of urologic malignancy based on various risk factors and AUA guidelines\par -Patient would like to forego a cystoscopy at this time\par -We had an extensive discussion regarding possible management options for erectile dysfunction, including PDE 5 inhibitors, SIMI, ICI, intraurethral alprostadil, penile prosthesis\par -After a thorough discussion of the risks and benefits of the aforementioned options he would like to try a trial of a PDE 5 inhibitor\par -Trial of Cialis\par -I discussed the risks, benefits, alternatives, and possible side effects of Cialis (tadalafil) therapy with the patient, including but not limited to headache, flushing, upset stomach, blurry vision, change in color vision, vision loss, and priapism with the patient.\par -Follow-up in 1 month none

## 2022-03-10 ENCOUNTER — APPOINTMENT (OUTPATIENT)
Dept: INTERNAL MEDICINE | Facility: CLINIC | Age: 42
End: 2022-03-10
Payer: MEDICAID

## 2022-03-10 VITALS
SYSTOLIC BLOOD PRESSURE: 127 MMHG | BODY MASS INDEX: 30.29 KG/M2 | DIASTOLIC BLOOD PRESSURE: 78 MMHG | HEART RATE: 64 BPM | TEMPERATURE: 97.6 F | WEIGHT: 193 LBS | HEIGHT: 67 IN | RESPIRATION RATE: 15 BRPM | OXYGEN SATURATION: 99 %

## 2022-03-10 DIAGNOSIS — N20.0 CALCULUS OF KIDNEY: ICD-10-CM

## 2022-03-10 PROCEDURE — 90715 TDAP VACCINE 7 YRS/> IM: CPT

## 2022-03-10 PROCEDURE — 90471 IMMUNIZATION ADMIN: CPT

## 2022-03-10 PROCEDURE — 99214 OFFICE O/P EST MOD 30 MIN: CPT | Mod: 25

## 2022-03-10 NOTE — ASSESSMENT
[FreeTextEntry1] : 1 dm  ---The following has been discussed:---\par -Targets for weight and HgA1c have been discussed with patient \par -FS goals have been reviewed with the patient in detail:\par AM <130 post meal<160-180\par -Diet and weight goals have been discussed with the patient in detail.\par -The importance of exercise in the treatment of diabetes has been discussed \par with the patient in detail.\par -Extensive dietary advice provided to patient and the need to avoid concentrated \par sweets/simple carbohydrates and to ensure to consume protein with each meal. \par -Patient instructed to limit carbohydrates to 60 gms per meal and 15 gms per \par snacks. \par -Patient to keep a blood sugar log to check fasting and before meals\par -Patient instructed on importance of daily feet inspection and to reports any \par open lesions to physician promptly\par \par 2.  hpn well controlled continue present medications an dlow salt diet \par 3.  hld  Discussed intake of plant based foods, including vegetables, fruits, and whole grain foods: legumes, nuts and seeds, fish or seafood, lean meats, and non-fat or low-fat diary foods. Plant based oils (non-tropical) in place of solid fats. Instructed patient to limit intake of high fat meats and processed meats, high-fat diary foods, dietary cholesterol and sodium, foods and beverages with added sugars. \par \par \par 4.  renal stones    Recommended Kidney stone prevention diet:\par - Good oral hydration so that urine is clear to light yellow, usually 1.5 to 2 Liters of fluids, mainly water\par - Less red meat\par - Less salt\par - Limit foods with oxalate like- dark green vegetables, rhubarb, chocolate, wheat bran, nuts, cranberries, and beans\par - Increase citrate in diet by consuming citrus fruits and juices. Discussed that erlin and limes have the most citric acid, oranges, grapefruits, and berries also contain appreciable amounts.\par and make sure its in line with his diabetic diet .  \par 5  vitd  fu levels

## 2022-03-10 NOTE — COUNSELING
[Sleep ___ hours/day] : Sleep [unfilled] hours/day [Plan in advance] : Plan in advance [Potential consequences of obesity discussed] : Potential consequences of obesity discussed [Benefits of weight loss discussed] : Benefits of weight loss discussed [Structured Weight Management Program suggested:] : Structured weight management program suggested [Encouraged to maintain food diary] : Encouraged to maintain food diary [Encouraged to increase physical activity] : Encouraged to increase physical activity [Encouraged to use exercise tracking device] : Encouraged to use exercise tracking device [____ min/wk Activity] : [unfilled] min/wk activity [FreeTextEntry1] : diabetic

## 2022-03-10 NOTE — PLAN
[FreeTextEntry1] : referrals  lab testing  fu cpe  Boostrix 5-2.5-18.5 SUSP\par \par GENERIC NAME:  Diphtheria and Tetanus Toxoids, and Acellular Pertussis Vaccine (dif THEER ee a & TET a nus TOKS shanaes, & ay SRI harshal lar per TUS sis vak SEEN) \par \par COMMON USES:  It is used to prevent diphtheria, tetanus, and pertussis. \par \par HOW TO USE THIS MEDICINE:  HOW IS THIS DRUG BEST TAKEN? Use this drug as ordered by your doctor. Read all information given to you. Follow all instructions closely. It is given as a shot into a muscle. HOW DO I STORE AND/OR THROW OUT THIS DRUG? If you need to store this drug at home, talk with your doctor, nurse, or pharmacist about how to store it. WHAT DO I DO IF I MISS A DOSE? Call your doctor to find out what to do. \par \par CAUTIONS:  For all patients taking this drug: Tell all of your health care providers that you take this drug. This includes your doctors, nurses, pharmacists, and dentists. This drug may not protect all people who use it. Talk with the doctor. If you have a latex allergy, talk with your doctor. Some products have latex. Not all brands of vaccines are for children. Talk with your child's doctor. Tell your doctor if you are pregnant, plan on getting pregnant, or are breast-feeding. You will need to talk about the benefits and risks to you and the baby. Infants: If your child was born premature, talk with the doctor. Trouble breathing has happened in these children after getting some vaccines. \par \par POSSIBLE SIDE EFFECTS:  WHAT ARE SOME SIDE EFFECTS THAT I NEED TO CALL MY DOCTOR ABOUT RIGHT AWAY? WARNING/CAUTION: Even though it may be rare, some people may have very bad and sometimes deadly side effects when taking a drug. Tell your doctor or get medical help right away if you have any of the following signs or symptoms that may be related to a very bad side effect: Signs of an allergic reaction, like rash; hives; itching; red, swollen, blistered, or peeling skin with or without fever; wheezing; tightness in the chest or throat; trouble breathing, swallowing, or talking; unusual hoarseness; or swelling of the mouth, face, lips, tongue, or throat. Feeling confused. Very bad dizziness or passing out. Change in eyesight. Seizures. A burning, numbness, or tingling feeling that is not normal. Weakness. Trouble controlling body movements. High fever. WHAT ARE SOME OTHER SIDE EFFECTS OF THIS DRUG? All drugs may cause side effects. However, many people have no side effects or only have minor side effects. Call your doctor or get medical help if any of these side effects or any other side effects bother you or do not go away: For all patients taking this drug: Pain, redness, or swelling where the shot was given. Headache. Feeling tired or weak. Mild fever. Chills. Upset stomach or throwing up. Stomach pain or diarrhea. Joint pain or swelling. Swollen gland. Young children: Feeling fussy. Not hungry. Feeling sleepy. Crying that is not normal. These are not all of the side effects that may occur. If you have questions about side effects, call your doctor. Call your doctor for medical advice about side effects. Report side effects to the FDA/CDC Vaccine Adverse Event Reporting System (VAERS) at https://vaers.hhs.gov/reportevent.html or by calling 1-896.866.9526. \par \par BEFORE USING THIS MEDICINE:  WHAT DO I NEED TO TELL MY DOCTOR BEFORE I TAKE THIS DRUG? TELL YOUR DOCTOR: If you are allergic to this drug; any part of this drug; or any other drugs, foods, or substances. Tell your doctor about the allergy and what signs you had. TELL YOUR DOCTOR: If you have seizures or any other brain or nervous system problem. TELL YOUR DOCTOR: If you have had a brain problem like coma, lowered level of awareness, or seizures from an unknown cause within 7 days of a previous vaccine that has pertussis. This is not a list of all drugs or health problems that interact with this drug. Tell your doctor and pharmacist about all of your drugs (prescription or OTC, natural products, vitamins) and health problems. You must check to make sure that it is safe for you to take this drug with all of your drugs and health problems. Do not start, stop, or change the dose of any drug without checking with your doctor. \par \par OVERDOSE:  If you think there has been an overdose, call your poison control center or get medical care right away. Be ready to tell or show what was taken, how much, and when it happened. \par \par ADDITIONAL INFORMATION:  If your symptoms or health problems do not get better or if they become worse, call your doctor. Do not share your drugs with others and do not take anyone else's drugs. Keep all drugs in a safe place. Keep all drugs out of the reach of children and pets. Throw away unused or  drugs. Do not flush down a toilet or pour down a drain unless you are told to do so. Check with your pharmacist if you have questions about the best way to throw out drugs. There may be drug take-back programs in your area. Some drugs may have another patient information leaflet. Check with your pharmacist. If you have any questions about this drug, please talk with your doctor, nurse, pharmacist, or other health care provider. \par \par Copyright [employer] All Rights Reserved.     \par

## 2022-03-10 NOTE — HEALTH RISK ASSESSMENT
[Never] : Never [No] : In the past 12 months have you used drugs other than those required for medical reasons? No [No falls in past year] : Patient reported no falls in the past year [0] : 2) Feeling down, depressed, or hopeless: Not at all (0) [PHQ-2 Negative - No further assessment needed] : PHQ-2 Negative - No further assessment needed [de-identified] : Dr Biswas  [de-identified] : 5 days a week  [de-identified] : diabetic low fat diet  [[id number]] : 0

## 2022-03-10 NOTE — HISTORY OF PRESENT ILLNESS
[FreeTextEntry1] : dm hld   [de-identified] : Pt has seen urologist for his microscopic hematuria  and had a renal sono and has bilateral small nonobstructing stones.   and angiomyolipoma. He has been unable to make an appt for diabetic educator.    he states his blood sugars are 125 to 140s.   He has lost 4lbs .  He has not had gemfibrozil  for two months   H -denies any headaches, nausea, vomiting, fever, chills, sweats, chest pain, chest pressure, diarrhea,  dysphagia, sour taste in the mouth, dizziness, leg swelling, leg pain, myalgias, arthralgias, itchy eyes, itchy ears, heartburn, or reflux.\par \par He is having constipation once every two days.  and is drinking 2 liters of water a day and eats fruits and vegetables.  \par

## 2022-03-10 NOTE — PHYSICAL EXAM
[PERRL] : pupils equal round and reactive to light [Normal Oropharynx] : the oropharynx was normal [Supple] : supple [Normal] : affect was normal and insight and judgment were intact [Comprehensive Foot Exam Normal] : Right and left foot were examined and both feet are normal. No ulcers in either foot. Toes are normal and with full ROM.  Normal tactile sensation with monofilament testing throughout both feet

## 2022-03-15 ENCOUNTER — APPOINTMENT (OUTPATIENT)
Dept: PULMONOLOGY | Facility: CLINIC | Age: 42
End: 2022-03-15

## 2022-03-23 ENCOUNTER — OUTPATIENT (OUTPATIENT)
Dept: OUTPATIENT SERVICES | Facility: HOSPITAL | Age: 42
LOS: 1 days | End: 2022-03-23
Payer: MEDICAID

## 2022-03-23 ENCOUNTER — APPOINTMENT (OUTPATIENT)
Dept: PODIATRY | Facility: CLINIC | Age: 42
End: 2022-03-23

## 2022-03-23 VITALS
RESPIRATION RATE: 18 BRPM | WEIGHT: 213 LBS | SYSTOLIC BLOOD PRESSURE: 119 MMHG | BODY MASS INDEX: 33.43 KG/M2 | DIASTOLIC BLOOD PRESSURE: 82 MMHG | TEMPERATURE: 98.8 F | HEART RATE: 74 BPM | HEIGHT: 67 IN | OXYGEN SATURATION: 99 %

## 2022-03-23 DIAGNOSIS — M79.674 PAIN IN RIGHT TOE(S): ICD-10-CM

## 2022-03-23 DIAGNOSIS — Z00.00 ENCOUNTER FOR GENERAL ADULT MEDICAL EXAMINATION WITHOUT ABNORMAL FINDINGS: ICD-10-CM

## 2022-03-23 DIAGNOSIS — Z98.89 OTHER SPECIFIED POSTPROCEDURAL STATES: Chronic | ICD-10-CM

## 2022-03-23 PROCEDURE — G0463: CPT

## 2022-03-23 NOTE — ASSESSMENT
[FreeTextEntry1] : PE:\par Vasc: Pulses palpable DP/PT, CFT <3 seconds distal maribell, skin temp warm to warm prox to distal LE, pedal hair growth present\par Derm: Incurvated medial distal nail digits 1 b/l, nails 1-5 within normal limits, no open lesions or rashes. \par Neuro: Protective sensation intact\par MSK: Pain on palpation medial distal border hallucal nail bed.  mild bunion bilateral\par \par A:\par Ingrown nail b/l hallucal nails\par Bunion b/l\par DM Type II\par \par P:\par Pt evaluated and chart reviewed\par Using sterile nail clippers, slant back was performed to b/l medial border hallucal nails \par Pt tolerated procedure well\par Applied bacitracin and Band-Aid to b/l hallucal nails \par Advised pt to check feet daily and wear supportive shoe gear\par Discussed pedal manifestations and complications with diabetes\par RTC PRN\par \par \par

## 2022-03-23 NOTE — HISTORY OF PRESENT ILLNESS
[FreeTextEntry1] : Pt presents for initial visit of painful incurvated nails to b/l great toes. Pt states they have been there for approximately 2 months and bother him when he walks. Pt states he is diabetic. Last a1c 5.6 in February. Denies numbness/tingling to both feet. No other pedal complaints.

## 2022-03-24 DIAGNOSIS — L60.0 INGROWING NAIL: ICD-10-CM

## 2022-03-24 DIAGNOSIS — M21.611 BUNION OF RIGHT FOOT: ICD-10-CM

## 2022-03-24 DIAGNOSIS — M21.612 BUNION OF LEFT FOOT: ICD-10-CM

## 2022-03-24 DIAGNOSIS — E11.9 TYPE 2 DIABETES MELLITUS WITHOUT COMPLICATIONS: ICD-10-CM

## 2022-03-29 ENCOUNTER — APPOINTMENT (OUTPATIENT)
Dept: UROLOGY | Facility: CLINIC | Age: 42
End: 2022-03-29

## 2022-07-19 ENCOUNTER — APPOINTMENT (OUTPATIENT)
Dept: ENDOCRINOLOGY | Facility: CLINIC | Age: 42
End: 2022-07-19

## 2022-07-19 VITALS
HEIGHT: 67 IN | DIASTOLIC BLOOD PRESSURE: 89 MMHG | HEART RATE: 65 BPM | SYSTOLIC BLOOD PRESSURE: 134 MMHG | BODY MASS INDEX: 32.96 KG/M2 | OXYGEN SATURATION: 98 % | WEIGHT: 210 LBS

## 2022-07-19 DIAGNOSIS — E55.9 VITAMIN D DEFICIENCY, UNSPECIFIED: ICD-10-CM

## 2022-07-19 LAB — GLUCOSE BLDC GLUCOMTR-MCNC: 126

## 2022-07-19 PROCEDURE — 36415 COLL VENOUS BLD VENIPUNCTURE: CPT

## 2022-07-19 PROCEDURE — 99214 OFFICE O/P EST MOD 30 MIN: CPT | Mod: 25

## 2022-07-20 LAB
25(OH)D3 SERPL-MCNC: 32.3 NG/ML
ALBUMIN SERPL ELPH-MCNC: 5.5 G/DL
ALP BLD-CCNC: 101 U/L
ALT SERPL-CCNC: 22 U/L
ANION GAP SERPL CALC-SCNC: 14 MMOL/L
AST SERPL-CCNC: 28 U/L
BASOPHILS # BLD AUTO: 0.07 K/UL
BASOPHILS NFR BLD AUTO: 1 %
BILIRUB SERPL-MCNC: 0.4 MG/DL
BUN SERPL-MCNC: 16 MG/DL
CALCIUM SERPL-MCNC: 10 MG/DL
CHLORIDE SERPL-SCNC: 102 MMOL/L
CHOLEST SERPL-MCNC: 214 MG/DL
CO2 SERPL-SCNC: 23 MMOL/L
CREAT SERPL-MCNC: 1.08 MG/DL
CREAT SPEC-SCNC: 145 MG/DL
EGFR: 90 ML/MIN/1.73M2
EOSINOPHIL # BLD AUTO: 0.06 K/UL
EOSINOPHIL NFR BLD AUTO: 0.9 %
ESTIMATED AVERAGE GLUCOSE: 154 MG/DL
GLUCOSE SERPL-MCNC: 120 MG/DL
HBA1C MFR BLD HPLC: 7 %
HCT VFR BLD CALC: 47.8 %
HDLC SERPL-MCNC: 37 MG/DL
HGB BLD-MCNC: 16 G/DL
IMM GRANULOCYTES NFR BLD AUTO: 0.6 %
LDLC SERPL CALC-MCNC: 130 MG/DL
LYMPHOCYTES # BLD AUTO: 2.02 K/UL
LYMPHOCYTES NFR BLD AUTO: 29.7 %
MAN DIFF?: NORMAL
MCHC RBC-ENTMCNC: 31.1 PG
MCHC RBC-ENTMCNC: 33.5 GM/DL
MCV RBC AUTO: 92.8 FL
MICROALBUMIN 24H UR DL<=1MG/L-MCNC: 4 MG/DL
MICROALBUMIN/CREAT 24H UR-RTO: 28 MG/G
MONOCYTES # BLD AUTO: 0.49 K/UL
MONOCYTES NFR BLD AUTO: 7.2 %
NEUTROPHILS # BLD AUTO: 4.13 K/UL
NEUTROPHILS NFR BLD AUTO: 60.6 %
NONHDLC SERPL-MCNC: 177 MG/DL
PLATELET # BLD AUTO: 274 K/UL
POTASSIUM SERPL-SCNC: 4.1 MMOL/L
PROT SERPL-MCNC: 8 G/DL
RBC # BLD: 5.15 M/UL
RBC # FLD: 13.5 %
SODIUM SERPL-SCNC: 138 MMOL/L
TRIGL SERPL-MCNC: 236 MG/DL
VIT B12 SERPL-MCNC: 453 PG/ML
WBC # FLD AUTO: 6.81 K/UL

## 2022-07-20 NOTE — PHYSICAL EXAM
[Alert] : alert [Well Nourished] : well nourished [Healthy Appearance] : healthy appearance [Obese] : obese [No Acute Distress] : no acute distress [Well Developed] : well developed [Normal Voice/Communication] : normal voice communication [Normal Sclera/Conjunctiva] : normal sclera/conjunctiva [No Proptosis] : no proptosis [No Neck Mass] : no neck mass was observed [No LAD] : no lymphadenopathy [Thyroid Not Enlarged] : the thyroid was not enlarged [No Thyroid Nodules] : no palpable thyroid nodules [No Respiratory Distress] : no respiratory distress [Normal Rate] : heart rate was normal [No Edema] : no peripheral edema [Pedal Pulses Normal] : the pedal pulses are present [Not Distended] : not distended [No Stigmata of Cushings Syndrome] : no stigmata of Cushings Syndrome [Normal Gait] : normal gait [No Clubbing, Cyanosis] : no clubbing  or cyanosis of the fingernails [No Involuntary Movements] : no involuntary movements were seen [Acanthosis Nigricans] : no acanthosis nigricans [Right foot was examined, including] : right foot ~C was examined, including visual inspection with sensory and pulse exams [Left foot was examined, including] : left foot ~C was examined, including visual inspection with sensory and pulse exams [Normal] : normal [2+] : 2+ in the dorsalis pedis [Diminished Throughout Both Feet] : normal tactile sensation with monofilament testing throughout both feet [No Tremors] : no tremors [Normal Sensation on Monofilament Testing] : normal sensation on monofilament testing of lower extremities [Normal Affect] : the affect was normal [Normal Insight/Judgement] : insight and judgment were intact [Normal Mood] : the mood was normal

## 2022-07-20 NOTE — HISTORY OF PRESENT ILLNESS
[FreeTextEntry1] : CC: Diabetes\par This is a 41-year-old male with type 2 diabetes mellitus, severe hypertriglyceridemia, pancreatitis, hypertension, hyperlipidemia, fatty liver, metabolic syndrome, obesity, vitamin D insufficiency here for consultation.\par He was hospitalized from March 4, 2021 until March 8, 2021 at [facility] with pancreatitis after blood work from PCPs office showed triglyceride level greater than 8850 mg/dL. This was his second episode of pancreatitis. Lopid, atorvastatin, and Lovaza were initiated.  Triglycerides on day of discharge were 232.  He is currently on atorvastatin 80 mg daily, gemfibrozil 600 mg 2 times a day, omega-3 4000 mg daily.\par Diabetes was diagnosed on routine blood work in 2016.  He was on Metformin in the past.\par He is currently on Basaglar 25 units daily and Admelog 10 units before meals.\par He self monitors blood glucose 2 times a day.  Fasting 120-125, 2 hours after dinner 130s.  Denies hypoglycemia.\par He saw his ophthalmologist in May 2022 and denies retinopathy.\par He denies neuropathy.\par He denies nephropathy.\par He is on valsartan 80 mg daily.\par Antibodies for AINSLEY are negative.

## 2022-07-20 NOTE — ASSESSMENT
[FreeTextEntry1] : This is a 41-year-old male with type 2 diabetes mellitus, severe hypertriglyceridemia, pancreatitis, hypertension, hyperlipidemia, fatty liver, metabolic syndrome, obesity, vitamin D insufficiency here for consultation.\par 1.  T2DM\par Check hemoglobin A1c.\par Dexcom CGM supplies sent to pharmacy.\par He is currently on Basaglar 25 units daily and Admelog 10 units before meals.\par He self monitors blood glucose 2 times a day.  Fasting 120-125, 2 hours after dinner 130s.  Denies hypoglycemia.\par He saw his ophthalmologist in May 2022 and denies retinopathy.\par He denies neuropathy.\par He denies nephropathy.  Check urine microalbumin.\par He is on valsartan 80 mg daily.\par Antibodies for AINSLEY are negative.\par Will consider resuming metformin pending results.\par Avoid DPP 4 inhibitors and GLP-1 agonist due to history of pancreatitis.\par Appointment with CDE.\par 2.  Severe hypertriglyceridemia/hyperlipidemia\par He is currently on atorvastatin 80 mg daily, gemfibrozil 600 mg 2 times a day, omega-3 4000 mg daily.\par Check lipid panel.\par 3.  Hypertension\par Controlled.\par 4.  Obesity\par Lifestyle modification.\par 5.  Vitamin D insufficiency\par Check 25 vitamin D.\par

## 2022-07-26 ENCOUNTER — APPOINTMENT (OUTPATIENT)
Dept: INTERNAL MEDICINE | Facility: CLINIC | Age: 42
End: 2022-07-26

## 2022-07-26 VITALS
BODY MASS INDEX: 33.27 KG/M2 | TEMPERATURE: 98.1 F | HEART RATE: 53 BPM | WEIGHT: 212 LBS | RESPIRATION RATE: 15 BRPM | SYSTOLIC BLOOD PRESSURE: 119 MMHG | DIASTOLIC BLOOD PRESSURE: 75 MMHG | HEIGHT: 67 IN | OXYGEN SATURATION: 97 %

## 2022-07-26 DIAGNOSIS — E11.69 TYPE 2 DIABETES MELLITUS WITH OTHER SPECIFIED COMPLICATION: ICD-10-CM

## 2022-07-26 DIAGNOSIS — Z87.898 PERSONAL HISTORY OF OTHER SPECIFIED CONDITIONS: ICD-10-CM

## 2022-07-26 DIAGNOSIS — E66.9 TYPE 2 DIABETES MELLITUS WITH OTHER SPECIFIED COMPLICATION: ICD-10-CM

## 2022-07-26 DIAGNOSIS — Z01.812 ENCOUNTER FOR PREPROCEDURAL LABORATORY EXAMINATION: ICD-10-CM

## 2022-07-26 DIAGNOSIS — R80.9 PROTEINURIA, UNSPECIFIED: ICD-10-CM

## 2022-07-26 PROCEDURE — 99395 PREV VISIT EST AGE 18-39: CPT

## 2022-07-26 RX ORDER — TADALAFIL 5 MG/1
5 TABLET ORAL
Qty: 20 | Refills: 0 | Status: COMPLETED | COMMUNITY
Start: 2022-03-10 | End: 2022-07-26

## 2022-07-26 NOTE — ASSESSMENT
[FreeTextEntry1] : health He is up to date with vaccines we discussed pneumonia vaccines in diabetic pt .   He is up to date with eye and podiatry exams  \par 2  bmi 33 he has gained wieght   and his blood sugars have increaed  with hgbA1c 7%   from 6.4 %    His triglycerides and chol have increased as well and his ldl is not optimal. He is taking his medications   but has been eating more.   Weight loss, exercise, and diet control were discussed and are highly encouraged. Treatment options were given such as, aqua therapy, and contacting a nutritionist. Recommended to use the elliptical, stationary bike, less use of treadmill. Mindful eating was explained to the patient Obesity is associated with worsening asthma, shortness of breath, and potential for cardiac disease, diabetes, and other underlying medical conditions.\par 3  dm  continue present managmenet  try to adhere to his diet and limited  carbohydrates  ---The following has been discussed:---\par -Targets for weight and HgA1c have been discussed with patient \par -FS goals have been reviewed with the patient in detail:\par AM <130 post meal<160-180\par -Diet and weight goals have been discussed with the patient in detail.\par -The importance of exercise in the treatment of diabetes has been discussed \par with the patient in detail.\par -Extensive dietary advice provided to patient and the need to avoid concentrated \par sweets/simple carbohydrates and to ensure to consume protein with each meal. \par -Patient instructed to limit carbohydrates to 60 gms per meal and 15 gms per \par snacks. \par -Patient to keep a blood sugar log to check fasting and before meals\par -Patient instructed on importance of daily feet inspection and to reports any \par open lesions to physician promptly\par \par 4 hld  trig  236  chol 214  ldl 130  diet exercise  continue present medications   prior  July his levels were normal but ldl still not optimal  \par Hyperlipedemia exercise weight loss low fat diet.  If you've been keeping an eye on your blood pressure and cholesterol levels, there's something else you might need to monitor: your triglycerides. Having a high level of triglycerides, a type of fat (lipid) in your blood, can increase your risk of heart disease.\par \par However, the same lifestyle choices that promote overall health can help lower your triglycerides, too.\par \par Triglycerides are a type of fat (lipid) found in your blood. When you eat, your body converts any calories it doesn't need to use right away into triglycerides. The triglycerides are stored in your fat cells. Later, hormones release triglycerides for energy between meals. If you regularly eat more calories than you burn, particularly "easy" calories like carbohydrates and fats, you may have high triglycerides (hypertriglyceridemia).\par \par A simple blood test can reveal whether your triglycerides fall into a healthy range.\par •Normal - Less than 150 milligrams per deciliter (mg/dL), or less than 1.7 millimoles per liter (mmol/L)\par •Borderline high - 150 to 199 mg/dL (1.8 to 2.2 mmol/L)\par •High - 200 to 499 mg/dL (2.3 to 5.6 mmol/L)\par •Very high - 500 mg/dL or above (5.7 mmol/L or above)\par \par Your doctor will usually check for high triglycerides as part of a cholesterol test (sometimes called a lipid panel or lipid profile). You'll have to fast for nine to 12 hours before blood can be drawn for an accurate triglyceride measurement.\par \par Triglycerides and cholesterol are separate types of lipids that circulate in your blood. Triglycerides store unused calories and provide your body with energy, and cholesterol is used to build cells and certain hormones. Because triglycerides and cholesterol can't dissolve in blood, they circulate throughout your body with the help of proteins that transport the lipids (lipoproteins).\par \par Although it's unclear how, high triglycerides may contribute to hardening of the arteries or thickening of the artery walls (atherosclerosis) - which increases the risk of stroke, heart attack and heart disease. Extremely high triglycerides - for example, levels above 1000 mg/dL (11.29 mmol/L) - can also cause acute pancreatitis.\par \par High triglycerides are often a sign of other conditions that increase the risk of heart disease and stroke as well, including obesity and metabolic syndrome - a cluster of conditions that includes too much fat around the waist, high blood pressure, high triglycerides, high blood sugar and abnormal cholesterol levels.\par \par Sometimes high triglycerides are a sign of poorly controlled type 2 diabetes, low levels of thyroid hormones (hypothyroidism), liver or kidney disease, or rare genetic conditions that affect how your body converts fat to energy. High triglycerides could also be a side effect of taking medications such as beta blockers, birth control pills, diuretics or steroids.\par \par Healthy lifestyle choices are key:\par •Lose weight. If you're overweight, losing 5 to 10 pounds can help lower your triglycerides. Motivate yourself by focusing on the benefits of losing weight, such as more energy and improved health.\par •Cut back on calories. Remember that extra calories are converted to triglycerides and stored as fat. Reducing your calories will reduce triglycerides.\par •Avoid sugary and refined foods. Simple carbohydrates, such as sugar and foods made with white flour, can increase triglycerides.\par •Choose healthier fats. Trade saturated fat found in meats for healthier monounsaturated fat found in plants, such as olive, peanut and canola oils. Substitute fish high in omega-3 fatty acids - such as mackerel and salmon - for red meat.\par •Limit how much alcohol you drink. Alcohol is high in calories and sugar and has a particularly potent effect on triglycerides. Even small amounts of alcohol can raise triglyceride levels.\par •Exercise regularly. Aim for at least 30 minutes of physical activity on most or all days of the week. Regular exercise can lower triglycerides and boost "good" cholesterol. Take a brisk daily walk, swim laps or join an exercise group. If you don't have time to exercise for 30 minutes, try squeezing it in 10 minutes at a time. Take a short walk, climb the stairs at work, or try some situps or pushups as you watch television.\par \par If healthy lifestyle changes aren't enough to control high triglycerides, your doctor might recommend some of the following:\par •Statins. Your doctor might prescribe these cholesterol-lowering drugs if you also have low high-density lipoprotein (HDL, or "good") cholesterol; high low-density lipoprotein (LDL, or "bad") cholesterol; or if you have a history of blocked arteries or diabetes. Examples include atorvastatin (Lipitor) and simvastatin (Zocor). Muscle pain is a potential side effect.\par •Fish oils. Also known as omega-3 fatty acids, fish oil supplements can help lower your triglycerides. High doses are needed, however, so this option is often reserved for people who have triglyceride levels over 500 mg/dL (5.7 mmol/L). \par •Fibrates. Fibrate medications, such as fenofibrate (TriCor, Fenoglide, others) and gemfibrozil (Lopid), also can lower your triglyceride levels. Fibrates seem to work best in people who have triglyceride levels over 500 mg/dL (5.7 mmol/L). Fibrates may increase the risk of side effects when taken together with statins.\par •Niacin. Niacin, sometimes called nicotinic acid, can lower your triglycerides and your "bad" cholesterol (low-density lipoprotein, or LDL, cholesterol). It's typically reserved for people who have triglyceride levels over 500 mg/dL (5.7 mmol/L). Don't take over-the-counter niacin without talking to your doctor first. Niacin can interact with other medications and can cause significant side effects.\par \par If your doctor prescribes medication to lower your triglycerides, take the medication as prescribed. And remember the significance of the healthy lifestyle changes you've made. Medications can help - but lifestyle matters, too\par \par 5.  fatty liver  weight loss exercise  repeat us of abd   Fatty Liver: The patient denies any jaundice or pruritus. The patient denies any alcohol use. The patient denies taking large doses of nonsteroidal anti-inflammatory drugs or acetaminophen. The findings are suggestive of fatty liver. The patient and I had a long discussion regarding the risks of fatty liver progressing to cirrhosis. The patient was told of the possible increased risk of developing liver failure, cirrhosis, ascites, GI bleeding secondary to varices, hepatic encephalopathy, bleeding tendencies and liver cancer. The patient was told of the importance of follow-up. The patient was advised to follow up every 6 months for blood work and imaging studies. The patient agreed and will follow up. The patient was advised to lose weight. I recommend a trial of vitamin E supplementation for the fatty liver. If the liver enzymes remain elevated, the patient may require a trial of Pioglitazone for the fatty liver. I recommend avoid alcohol and hepato-toxic agents. The patient was also advised to avoid NSAIDs, Acetaminophen and any other hepatotoxic drugs. The patient was also advised not to share needles, razors, scissors, nail clippers, etc.. The patient is to continue close follow-up in our office for blood work and exams. If the liver enzymes remain elevated, the patient may require a CT guided liver biopsy to assess the liver parenchyma and for possible treatment. We had a long discussion regarding the risks and benefits of the procedure. The patient was told of the risks of bleeding, perforation, infections, emergency surgery and missing lesions. The patient agreed and will follow-up to reassess the symptoms Patient has been counseled on life style interventions, including but not limited to: weight loss (3-5% loss of body weight might improve fat in the liver, while 7-10% needed for potential improvement of other components, including inflammation and scarring of the liver, called fibrosis); healthy diet, avoiding added sugars, sodas, avoiding saturated fats, limiting sodium, avoiding alcohol; and on the importance of regular exercise (> 150 min/week moderate intensity aerobic exercise with at least 2x/week muscle strengthening or exercise as tolerated). \par - I explained to patient the natural Hx of NAFLD. \par \par 6  sleep apnea he is no using cpap   leep apnea is associated with adverse clinical consequences which an affect most organ systems. Cardiovascular disease risk includes arrhythmias, atrial fibrillation, hypertension, coronary artery disease, and stroke. Metabolic disorders include diabetes type 2, non-alcoholic fatty liver disease. Mood disorder especially depression; and cognitive decline especially in the elderly. Associations with chronic reflux/Roth’s esophagus some but not all inclusive. \par -Reasons include arousal consistent with hypopnea; respiratory events most prominent in REM sleep or supine position; therefore sleep staging and body position are important for accurate diagnosis and estimation of AHI. He never received his machine.\par \par 7 htn  normal  and well controlled continue present management.

## 2022-07-26 NOTE — HISTORY OF PRESENT ILLNESS
[FreeTextEntry1] : cpe  [de-identified] : Pt is a 41 yr old man with tyep 2 dm, hypertriglyceridemia, hypercholesterolemia , hematuria, angiomyolipoma of kidneys , hx of pancreatitis  , fatty liver  , htn,  polycythemia , herniated disc , flowers, satish  and elevated liver enzymes who is here for his cpe.  He -denies any headaches, nausea, vomiting, fever, chills, sweats, chest pain, chest pressure, diarrhea, constipation, dysphagia, sour taste in the mouth, dizziness, leg swelling, leg pain, myalgias, arthralgias, itchy eyes, itchy ears, heartburn, or reflux.\par \par \par

## 2022-07-26 NOTE — CURRENT MEDS
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; hx marijuana use, with positive UDS for [facility] 2021; and alcohol use with last use 2020; MOB had prior C&Y involvement  SW reviewed MOB's chart to which MOB UDS results were negative on admission  Infant UDS results negative  Cord blood sent  SW met with MOB and FOB Vivienne Painting @ bedside with introduction and to complete assessment  MOB appeared annoyed upon SW entering room with introduction, however appeared appropriate and attentive to infant and was agreeable to speaking with FOB present  MOB reports this is 4th child  Reports having all baby necessities, support system and transportation  MOB plans to breast feed, pump ordered  MOB denies any substance abuse during pregnancy  MOB reports hx with C&Y and reports no current case  When inquiry made concerning open case during  admission, MOB reports that case was closed  MOB declined an resources at this time  No additional SW concerns are noted for discharge  [Takes medication as prescribed] : takes [None] : Patient does not have any barriers to medication adherence [Yes] : Reviewed medication list for presence of high-risk medications.

## 2022-07-26 NOTE — PHYSICAL EXAM
[Well Developed] : well developed [Well Nourished] : well nourished [Conjunctiva] : the conjunctiva were normal in both eyes [PERRL] : pupils were equal in size, round, and reactive to light [EOM Intact] : extraocular movements were intact [Normal Appearance] : was normal in appearance [Neck Supple] : was supple [Enlarged Diffusely] : was not enlarged [JVP Elevated ___cm] : the JVP was not elevated [Rate ___] : at [unfilled] breaths per minute [Normal Rhythm/Effort] : normal respiratory rhythm and effort [Clear Bilaterally] : the lungs were clear to auscultation bilaterally [Normal to Percussion] : the lungs were normal to percussion [5th Left ICS - MCL] : palpated at the 5th LICS in the midclavicular line [Heart Rate ___] : [unfilled] bpm [Rhythm Regular] : regular [Normal Rate] : normal [Normal S1] : normal S1 [Normal S2] : normal S2 [S3] : no S3 [S4] : no S4 [No Murmur] : no murmurs heard [No Pitting Edema] : no pitting edema present [Rt] : no varicose veins of the right leg [Lt] : no varicose veins of the left leg [Right Carotid Bruit] : no bruit heard over the right carotid [Left Carotid Bruit] : no bruit heard over the left carotid [Right Femoral Bruit] : no bruit heard over the right femoral artery [Left Femoral Bruit] : no bruit heard over the left femoral artery [2+] : left 2+ [No Abnormalities] : the abdominal aorta was not enlarged and no bruit was heard [Bruit] : no bruit heard [Examination Of The Breasts] : a normal appearance [No Discharge] : no discharge [Soft, Nontender] : the abdomen was soft and nontender [No Mass] : no masses were palpated [No HSM] : no hepatosplenomegaly noted [None] : no CVA tenderness [Penis Abnormality] : normal uncircumcised penis [Urinary Bladder Findings] : the bladder was normal on palpation [Rectal Exam - Seminal Vesicles] : the seminal vesicles were normal [Epididymis] : the epididymides were normal [Testes Tenderness] : no tenderness of the testes [Testes Mass (___cm)] : there were no testicular masses [Postauricular Lymph Nodes Enlarged Bilaterally] : nodes not enlarged [Preauricular Lymph Nodes Enlarged Bilaterally] : nodes not enlarged [Submandibular Lymph Nodes Enlarged Bilaterally] : nodes not enlarged [Suboccipital Lymph Nodes Enlarged Bilaterally] : nodes not enlarged [Submental Lymph Nodes Enlarged] : nodes not enlarged [Cervical Lymph Nodes Enlarged Posterior Bilaterally] : nodes not enlarged [Cervical Lymph Nodes Enlarged Anterior Bilaterally] : nodes not enlarged [Supraclavicular Lymph Nodes Enlarged Bilaterally] : nodes not enlarged [Axillary Lymph Nodes Enlarged Bilaterally] : nodes not enlarged [Epitrochlear Lymph Nodes Enlarged Bilaterally] : nodes not enlarged [Femoral Lymph Nodes Enlarged Bilaterally] : nodes not enlarged [Inguinal Lymph Nodes Enlarged Bilaterally] : nodes not enlarged [No Lymphangitis] : no lymphangitis observed [Normal Kyphosis] : normal kyphosis [No Visual Abnormalities] : no visible abnormalities [Normal Lordosis] : normal lordosis [No Scoliosis] : no scoliosis [No Tenderness to Palpation] : no spine tenderness on palpation [No Masses] : no masses [Full ROM] : full ROM [No Pain with ROM] : no pain with motion in any direction [Intact] : all reflexes within normal limits bilaterally [Normal Station and Gait] : the gait and station were normal [Normal Motor Tone] : the muscle tone was normal [Involuntary Movements] : no involuntary movements were seen [Normal Scalp] : inspection of the scalp showed no abnormalities [Examination Of The Hair] : texture and distribution of hair was normal [Abnormal Color] : normal color and pigmentation [Complexion Medium] : medium complexion [Skin Lesions 1] : no skin lesions were observed [Multiple Tattoos] : multiple tattoos observed [Skin Turgor Decreased] : normal skin turgor [Normal] : the deep tendon reflexes were normal [Normal Mental Status] : the patient's orientation, memory, attention, language and fund of knowledge were normal [Appropriate] : appropriate [Impaired judgment] : intact judgment [Impaired Insight] : intact insight [Comprehensive Foot Exam Normal] : Right and left foot were examined and both feet are normal. No ulcers in either foot. Toes are normal and with full ROM.  Normal tactile sensation with monofilament testing throughout both feet [de-identified] : tongue normal teeth in good repair

## 2022-07-26 NOTE — COUNSELING
[Sleep ___ hours/day] : Sleep [unfilled] hours/day [Engage in a relaxing activity] : Engage in a relaxing activity [Plan in advance] : Plan in advance [Potential consequences of obesity discussed] : Potential consequences of obesity discussed [Benefits of weight loss discussed] : Benefits of weight loss discussed [Structured Weight Management Program suggested:] : Structured weight management program suggested [Encouraged to maintain food diary] : Encouraged to maintain food diary [Encouraged to increase physical activity] : Encouraged to increase physical activity [Encouraged to use exercise tracking device] : Encouraged to use exercise tracking device [Weigh Self Weekly] : weigh self weekly [Decrease Portions] : decrease portions [____ min/wk Activity] : [unfilled] min/wk activity [Keep Food Diary] : keep food diary [FreeTextEntry1] : diabetic low fat

## 2022-07-26 NOTE — HEALTH RISK ASSESSMENT
[Fair] : ~his/her~ current health as fair  [Very Good] : ~his/her~  mood as very good [Never] : Never [No] : In the past 12 months have you used drugs other than those required for medical reasons? No [No falls in past year] : Patient reported no falls in the past year [0] : 2) Feeling down, depressed, or hopeless: Not at all (0) [PHQ-2 Negative - No further assessment needed] : PHQ-2 Negative - No further assessment needed [HIV test declined] : HIV test declined [Hepatitis C test offered] : Hepatitis C test offered [None] : None [With Family] : lives with family [# of Members in Household ___] :  household currently consist of [unfilled] member(s) [Employed] : employed [] :  [# Of Children ___] : has [unfilled] children [Sexually Active] : sexually active [Feels Safe at Home] : Feels safe at home [Fully functional (bathing, dressing, toileting, transferring, walking, feeding)] : Fully functional (bathing, dressing, toileting, transferring, walking, feeding) [Fully functional (using the telephone, shopping, preparing meals, housekeeping, doing laundry, using] : Fully functional and needs no help or supervision to perform IADLs (using the telephone, shopping, preparing meals, housekeeping, doing laundry, using transportation, managing medications and managing finances) [Smoke Detector] : smoke detector [Carbon Monoxide Detector] : carbon monoxide detector [Safety elements used in home] : safety elements used in home [Seat Belt] :  uses seat belt [Sunscreen] : uses sunscreen [FreeTextEntry1] : none  [de-identified] : endocrinologist  Dr Yen  [de-identified] : three times a week  . [de-identified] : diabetic  low fat  keto    [[id number]] : 0 [Change in mental status noted] : No change in mental status noted [Language] : denies difficulty with language [Behavior] : denies difficulty with behavior [Learning/Retaining New Information] : denies difficulty learning/retaining new information [Handling Complex Tasks] : denies difficulty handling complex tasks [Reasoning] : denies difficulty with reasoning [Spatial Ability and Orientation] : denies difficulty with spatial ability and orientation [Reports changes in hearing] : Reports no changes in hearing [Reports changes in vision] : Reports no changes in vision [Reports changes in dental health] : Reports no changes in dental health [Guns at Home] : no guns at home [Travel to Developing Areas] : does not  travel to developing areas [TB Exposure] : is not being exposed to tuberculosis [Caregiver Concerns] : does not have caregiver concerns [FreeTextEntry2] [place]  [de-identified] : last eye exam 5/22  [de-identified] : last dental 7 months ago  [AdvancecareDate] : 7/22

## 2022-11-29 ENCOUNTER — APPOINTMENT (OUTPATIENT)
Dept: INTERNAL MEDICINE | Facility: CLINIC | Age: 42
End: 2022-11-29

## 2023-01-12 NOTE — DISCHARGE NOTE NURSING/CASE MANAGEMENT/SOCIAL WORK - PATIENT PORTAL LINK FT
Called [facility] tele [id number]. You can access the FollowMyHealth Patient Portal offered by [facility] by registering at the following website: [email or web address]. By joining [facility]’s FollowMyHealth portal, you will also be able to view your health information using other applications (apps) compatible with our system.

## 2023-03-22 ENCOUNTER — NON-APPOINTMENT (OUTPATIENT)
Age: 43
End: 2023-03-22

## 2023-04-26 ENCOUNTER — APPOINTMENT (OUTPATIENT)
Dept: INTERNAL MEDICINE | Facility: CLINIC | Age: 43
End: 2023-04-26
Payer: MEDICAID

## 2023-04-26 VITALS
SYSTOLIC BLOOD PRESSURE: 110 MMHG | TEMPERATURE: 97.7 F | OXYGEN SATURATION: 98 % | HEIGHT: 67 IN | DIASTOLIC BLOOD PRESSURE: 70 MMHG | HEART RATE: 60 BPM | BODY MASS INDEX: 28.72 KG/M2 | WEIGHT: 183 LBS

## 2023-04-26 DIAGNOSIS — R20.2 PARESTHESIA OF SKIN: ICD-10-CM

## 2023-04-26 DIAGNOSIS — I10 ESSENTIAL (PRIMARY) HYPERTENSION: ICD-10-CM

## 2023-04-26 DIAGNOSIS — E11.9 TYPE 2 DIABETES MELLITUS W/OUT COMPLICATIONS: ICD-10-CM

## 2023-04-26 DIAGNOSIS — Z79.4 TYPE 2 DIABETES MELLITUS W/OUT COMPLICATIONS: ICD-10-CM

## 2023-04-26 PROCEDURE — 99215 OFFICE O/P EST HI 40 MIN: CPT | Mod: 25

## 2023-04-26 RX ORDER — INSULIN LISPRO 100 U/ML
100 INJECTION, SOLUTION SUBCUTANEOUS
Qty: 1 | Refills: 3 | Status: COMPLETED | COMMUNITY
Start: 2021-03-27 | End: 2023-04-26

## 2023-04-26 RX ORDER — METFORMIN ER 500 MG 500 MG/1
500 TABLET ORAL
Qty: 180 | Refills: 2 | Status: COMPLETED | COMMUNITY
Start: 2022-07-26 | End: 2023-04-26

## 2023-04-26 RX ORDER — ENOXAPARIN SODIUM 40 MG/.4ML
40 INJECTION, SOLUTION SUBCUTANEOUS
Qty: 60 | Refills: 0 | Status: COMPLETED | COMMUNITY
Start: 2023-04-26 | End: 2023-04-26

## 2023-04-26 RX ORDER — VALSARTAN 80 MG/1
80 TABLET, COATED ORAL DAILY
Qty: 90 | Refills: 3 | Status: COMPLETED | COMMUNITY
Start: 2020-07-08 | End: 2023-04-26

## 2023-04-26 RX ORDER — OMEGA-3-ACID ETHYL ESTERS CAPSULES 1 G/1
1 CAPSULE, LIQUID FILLED ORAL
Qty: 360 | Refills: 3 | Status: COMPLETED | COMMUNITY
Start: 2020-09-15 | End: 2023-04-26

## 2023-04-26 RX ORDER — GEMFIBROZIL 600 MG/1
600 TABLET, FILM COATED ORAL
Qty: 180 | Refills: 2 | Status: COMPLETED | COMMUNITY
Start: 2020-02-25 | End: 2023-04-26

## 2023-04-26 RX ORDER — INSULIN GLARGINE 100 [IU]/ML
100 INJECTION, SOLUTION SUBCUTANEOUS
Qty: 8 | Refills: 6 | Status: COMPLETED | COMMUNITY
Start: 2020-02-25 | End: 2023-04-26

## 2023-04-26 RX ORDER — FOLIC ACID 1 MG/1
1 TABLET ORAL
Qty: 30 | Refills: 0 | Status: COMPLETED | COMMUNITY
Start: 2020-02-25 | End: 2023-04-26

## 2023-04-26 RX ORDER — OMEGA-3/DHA/EPA/FISH OIL 300-1000MG
1000 CAPSULE ORAL
Qty: 120 | Refills: 0 | Status: DISCONTINUED | COMMUNITY
Start: 2022-03-10 | End: 2023-04-26

## 2023-04-26 NOTE — HISTORY OF PRESENT ILLNESS
[FreeTextEntry1] : fu  [de-identified] : Pt states one month ago he had bariactric surgery  sleeve  and has lost weight  and is now  89  with bmi  28 .  he has had blood sugars in 100s  and 90s .   He is taking mvi with minerals  pantoprazole 40 mg Enoxaparin  40 mg/0.4mg   in am and hs.,  He -denies any headaches, nausea, vomiting, fever, chills, sweats, chest pain, chest pressure, diarrhea, constipation, dysphagia, sour taste in the mouth, dizziness, leg swelling, leg pain, myalgias, arthralgias, itchy eyes, itchy ears, heartburn, or reflux.\par \par \par He had the surgery in [place]   and was followed for three months.      He states  before surgery he  had bent over  and felt  pain  and he has lower back pain and  radiates to  left leg to ankle and went to urgent care and given  muscle relaxers and pain meds  Motrin and took it for 4 days  but pain continues  .    He doesn’t have  weakness but has numbness in his leg  lower.  he has not had any falls or trauma  . The pain level is  9-10/10 when walking .

## 2023-04-26 NOTE — COUNSELING
[Fall prevention counseling provided] : Fall prevention counseling provided [Adequate lighting] : Adequate lighting [No throw rugs] : No throw rugs [Use proper foot wear] : Use proper foot wear [Use recommended devices] : Use recommended devices [Sleep ___ hours/day] : Sleep [unfilled] hours/day [Plan in advance] : Plan in advance [Weigh Self Weekly] : weigh self weekly [Decrease Portions] : decrease portions [____ min/wk Activity] : [unfilled] min/wk activity [Keep Food Diary] : keep food diary [FreeTextEntry2] : bmi 28

## 2023-04-26 NOTE — PHYSICAL EXAM
[No Acute Distress] : no acute distress [Normal Oropharynx] : the oropharynx was normal [No JVD] : no jugular venous distention [Supple] : supple [No Lymphadenopathy] : no lymphadenopathy [Normal Rate] : normal rate  [Regular Rhythm] : with a regular rhythm [Normal S1, S2] : normal S1 and S2 [No Edema] : there was no peripheral edema [No Extremity Clubbing/Cyanosis] : no extremity clubbing/cyanosis [Normal Anterior Cervical Nodes] : no anterior cervical lymphadenopathy [Normal Posterior Cervical Nodes] : no posterior cervical lymphadenopathy [Normal Kyphosis] : normal kyphosis [Full ROM] : full ROM [No Pain with ROM] : no pain with motion in any direction [No Visual Abnormalities] : no visible abnormalities [Normal Lordosis] : normal lordosis [No Scoliosis] : no scoliosis [No Tenderness to Palpation] : no spine tenderness on palpation [No Masses] : no masses [Intact] : all reflexes within normal limits bilaterally [Normal Station and Gait] : the gait and station were normal [Normal Motor Tone] : the muscle tone was normal [Involuntary Movements] : no involuntary movements were seen [Coordination Grossly Intact] : coordination grossly intact [No Focal Deficits] : no focal deficits [Normal Gait] : normal gait [Deep Tendon Reflexes (DTR)] : deep tendon reflexes were 2+ and symmetric [Normal] : affect was normal and insight and judgment were intact [Comprehensive Foot Exam Normal] : Right and left foot were examined and both feet are normal. No ulcers in either foot. Toes are normal and with full ROM.  Normal tactile sensation with monofilament testing throughout both feet [de-identified] : scars noted  from bariactric surgery healing

## 2023-04-26 NOTE — ASSESSMENT
[FreeTextEntry1] : 1  dm  Pt had bariactric surgery and  is not on any medication for his diabetes   and states his blood sugars are  100 . ---The following has been discussed:---\par -Targets for weight and HgA1c have been discussed with patient \par -FS goals have been reviewed with the patient in detail:\par AM <130 post meal<160-180\par -Diet and weight goals have been discussed with the patient in detail.\par -The importance of exercise in the treatment of diabetes has been discussed \par with the patient in detail.\par -Extensive dietary advice provided to patient and the need to avoid concentrated \par sweets/simple carbohydrates and to ensure to consume protein with each meal. \par -Patient instructed to limit carbohydrates to 60 gms per meal and 15 gms per \par snacks. \par -Patient to keep a blood sugar log to check fasting and before meals\par -Patient instructed on importance of daily feet inspection and to reports any \par open lesions to physician promptly\par \par 2  hld   to lower  LDL and non HDL  cholesterol levels     1 limit your intake of foods full of saturated fats  , trans fats, and dietary cholesterol .  Food with a lot of saturated fate include butter, fatty flesh like red meat, full fat and low fat dairy  products  , palm oil and coconut oil .   If you see partially hydrogenated fat in the ingredient  list of food label that food has trans fats.  Top sources of dietary chol include egg yolks , organ meats and shell fish.  one Type of fat omega 3 Fatty acids has been shown to protect against heart disease  . Good sources are cold water fish like salmon, mackerel , halibut ., trout  herring and sardines.     Limit  your intake of meat , poultry and fish to no more than 3.5  ounces per day     Eat a lot more fiber rich foods  like beans , oats, barley fruits and vegetables   .  Food naturally rich in soluble fiber  are good at lowering cholesterol .    Excellent choices  are  oats , oat bran , barley , peas , yams sweet potatoes and legumes  or beans .  Good fruit sources are berries passion fruit, oranges pears,, apricots , apples  and nectarines  .    choose protein rich plant foods   such as legumes or beans nuts and seeds over meat.     Lose as much weight as possible and exercise   Take plant sterol supplements   .A Daily intake  of 1-2 gms  of plant sterols  lowers ldl .    Best choice is supplements  such as Cholestoff  by natures made   because they don’t have calories  sugar trans fats   an salt  of many foods enriched with plant sterols.    Take  Metamucil or psyllium   .   Studies have shown 9-10 gms as daily of psyllium   the equivalent of  about  3 teaspoons  of sugar free Metamucil   reduced LDL levels  .\par he is not on medications  \par 3 htn resolved  not on any medications \par 4  sciatica  Sciatica — Sciatica is a type of radiculopathy; it occurs when one of the five spinal nerve roots, which are branches of the sciatic nerve, is irritated. The pain is typically sharp or burning and extends down the back or side of the thigh and may reach as far as the foot or ankle. You may also feel numbness or tingling. Occasionally, the sciatica may also be associated with muscle weakness in the leg or the foot. If a disc is herniated, sciatic pain often increases with coughing, sneezing, or bearing down.\par \par Neurogenic claudication — Neurogenic claudication is a type of pain that can occur when the spinal cord is compressed due to narrowing of the spinal canal from arthritis or other causes (see 'Lumbar spinal stenosis' above). The pain runs down the back to the buttocks, thighs, and lower legs, often involving both sides of the body. This may cause limping and weakness in the legs. Pain usually gets worse when extending the lower spine (eg, when standing or walking) and gets better when flexing the spine by sitting, stooping, or leaning forward, even while walking.\par \par \par IMAGING TESTS\par The majority of people with low back pain improve within several weeks and do not require imaging tests such as X-rays.\par \par Imaging tests, including X-rays, computed tomography (CT) scans, or magnetic resonance imaging (MRI), may be recommended in certain situations; however, they are not routinely ordered for all people with low back pain. These tests come with their own risks and have no benefit for most people with low back pain.\par \par X-rays — X-rays (radiographs) may be recommended in certain cases, such as people who have risk factors for or signs of cancer or vertebral compression fracture related to osteoporosis. X-rays expose the body to radiation, which is one of the reasons they are not routinely done unless a particular issue is suspected.\par \par X-rays do not usually show enough detail to diagnose a herniated disc or spinal stenosis. Other common conditions, such as degenerative disc disease and facet joint arthropathy, may be seen on X-ray, but these are common problems that are also present in many people who do not have back pain. Because of this, and because finding X-ray evidence of these conditions does not change the immediate treatment approach, X-rays are typically not recommended to look for them.\par \par CT and MRI — CT scans and MRI provide detailed images of the soft tissues and bony structures of the back. A CT or MRI is usually necessary to diagnose a bulging or herniated disc or spinal stenosis. One of these tests may be recommended if there are risk factors or signs of infection or cancer, if surgery is being considered, or if low back pain persists for more than four to six weeks and the cause is not clear.\par \par Most people with low back pain do not require a CT or MRI. Disc and spine abnormalities are common even among people without low back pain. In fact, a herniated disc is seen on CT scan or MRI in 25 percent of people without low back pain. Finding an abnormality on an imaging test can lead to further testing and treatment that may not be helpful or necessary. CT scans also expose the body to radiation (even more so than X-rays). MRI is based on magnetic fields and does not require radiation.\par \par \par ACUTE LOW BACK PAIN TREATMENT\par Unless acute low back pain is caused by a serious medical condition (which is uncommon), it typically resolves fairly quickly, even if there is a bulging or herniated disc.\par \par Still, low back pain can make it hard to do your usual activities, and it can be frustrating to feel like you just have to wait for it to get better. Below are some simple things you can do that may help relieve your pain.\par \par Remaining active — Many people are afraid that they will hurt their back further or delay recovery by remaining active. However, remaining active, to the extent that you are able, is one of the best things you can do for your back.\par \par If you have severe pain, you may need to rest your back for a day or so. It may be most comfortable to lie on your back with a pillow under your knees and your head and shoulders elevated. For sleeping, you may want to lie on your side with your upper knee bent and a pillow between your knees. However, prolonged bed rest is not recommended. Studies have shown that people with low back pain recover faster when they remain active. Movement helps to relieve muscle spasms and prevents loss of muscle strength.\par \par While you should avoid strenuous activities and sports while you are in pain, it is fine to continue doing regular day-to-day activities and light exercises, such as walking. If certain activities cause your back to hurt too much, try something else instead.\par \par Heat — Using a heating pad or heated wrap can help with low back pain during the first few weeks. It is not clear if cold helps as well, but some people may find that it relieves pain temporarily.\par \par Modifications at work — Most experts recommend that people with low back pain continue to work so long as it is possible to avoid prolonged standing or sitting and heavy lifting. If your job does not allow you to sit or stand comfortably, you may need to take some time off work while you recover. While standing at work, stepping on a block of wood with one foot (and periodically alternating the foot on the block) may be helpful.\par \par Pain medications — You can try taking an over-the-counter medication to help relieve pain. Nonsteroidal antiinflammatory drugs (NSAIDs), such as ibuprofen (sample brand names: Advil, Motrin) and naproxen (brand name: Aleve), may work better than acetaminophen (brand name: Tylenol) for low back pain.\par \par If you do take pain medication, it may be more effective to take a dose on a regular basis for three to five days, rather than using the medication only when your pain becomes unbearable. (See "Patient education: Nonsteroidal antiinflammatory drugs (NSAIDs) (Beyond the Basics)".)\par \par Muscle relaxants (eg, cyclobenzaprine [brand name: Flexeril]) are prescription medications; while these may help relieve back pain, they can cause drowsiness and are probably no better than ibuprofen in relieving pain. Your health care provider can talk to you about whether muscle relaxants might help in your situation. They may be helpful before bedtime when used for a short time, ie, a week or two. People who need to be alert, such as while driving or operating machinery, should not use muscle relaxants.\par \par Opioids (drugs derived from morphine) are not recommended for most people with back pain. In rare situations, a health care provider might prescribe them for a few days if a person has severe pain that is not responding to other treatments, but they are generally not much more effective than NSAIDs. In addition, opioids have a relatively high risk of side effects and the potential to cause harm, including the risk of dependency and abuse.\par \par Exercise — Starting a new exercise program immediately after a new episode of low back pain will not speed recovery from the acute episode. However, there is evidence that exercise is beneficial in people with chronic back pain. (See 'Chronic low back pain treatment' below.)\par \par Spinal manipulation — "Spinal manipulation" is a technique sometimes used by chiropractors, physical therapists, osteopaths, massage therapists, and others to relieve back pain. It involves moving the joints of the spine beyond the normal range of motion. Studies suggest that spinal manipulation may provide modest pain relief and improved function, and it generally appears to be safe if performed by an experienced professional. If you are interested in trying this approach, talk with your health care provider about how to integrate it into your treatment plan.\par \par Acupuncture — Acupuncture involves inserting very fine needles into specific points, as determined by traditional Chinese maps of the body's flow of energy. There is not consistent evidence that acupuncture is effective for people with acute low back pain; however, some people find it helpful.\par \par Massage —\par   He will have xray start gabapentin and refer to neurologist and spine specialist  He has had  surgery in past  if continues  will try steroids.  \par 5.   fatty liver will fu abd sonogram in few months    Fatty Liver: The patient denies any jaundice or pruritus. The patient denies any alcohol use. The patient denies taking large doses of nonsteroidal anti-inflammatory drugs or acetaminophen. The findings are suggestive of fatty liver. The patient and I had a long discussion regarding the risks of fatty liver progressing to cirrhosis. The patient was told of the possible increased risk of developing liver failure, cirrhosis, ascites, GI bleeding secondary to varices, hepatic encephalopathy, bleeding tendencies and liver cancer. The patient was told of the importance of follow-up. The patient was advised to follow up every 6 months for blood work and imaging studies. The patient agreed and will follow up. The patient was advised to lose weight. I recommend a trial of vitamin E supplementation for the fatty liver. If the liver enzymes remain elevated, the patient may require a trial of Pioglitazone for the fatty liver. I recommend avoid alcohol and hepato-toxic agents. The patient was also advised to avoid NSAIDs, Acetaminophen and any other hepatotoxic drugs. The patient was also advised not to share needles, razors, scissors, nail clippers, etc.. The patient is to continue close follow-up in our office for blood work and exams. If the liver enzymes remain elevated, the patient may require a CT guided liver biopsy to assess the liver parenchyma and for possible treatment. We had a long discussion regarding the risks and benefits of the procedure. The patient was told of the risks of bleeding, perforation, infections, emergency surgery and missing lesions. The patient agreed and will follow-up to reassess the symptoms Patient has been counseled on life style interventions, including but not limited to: weight loss (3-5% loss of body weight might improve fat in the liver, while 7-10% needed for potential improvement of other components, including inflammation and scarring of the liver, called fibrosis); healthy diet, avoiding added sugars, sodas, avoiding saturated fats, limiting sodium, avoiding alcohol; and on the importance of regular exercise (> 150 min/week moderate intensity aerobic exercise with at least 2x/week muscle strengthening or exercise as tolerated). \par - I explained to patient the natural Hx of NAFLD. \par \par 6 sleep apnea he is not on cpapa and sleeps well and no witnessed apnea  leep apnea is associated with adverse clinical consequences which an affect most organ systems. Cardiovascular disease risk includes arrhythmias, atrial fibrillation, hypertension, coronary artery disease, and stroke. Metabolic disorders include diabetes type 2, non-alcoholic fatty liver disease. Mood disorder especially depression; and cognitive decline especially in the elderly. Associations with chronic reflux/Roth’s esophagus some but not all inclusive. \par -Reasons include arousal consistent with hypopnea; respiratory events most prominent in REM sleep or supine position; therefore sleep staging and body position are important for accurate diagnosis and estimation of AHI. \par \par

## 2023-04-26 NOTE — HEALTH RISK ASSESSMENT
[No] : In the past 12 months have you used drugs other than those required for medical reasons? No [No falls in past year] : Patient reported no falls in the past year [0] : 2) Feeling down, depressed, or hopeless: Not at all (0) [PHQ-2 Negative - No further assessment needed] : PHQ-2 Negative - No further assessment needed [Never] : Never [de-identified] : walking  [[id number]] : 0

## 2023-04-27 LAB
25(OH)D3 SERPL-MCNC: 41.5 NG/ML
ALBUMIN SERPL ELPH-MCNC: 4.7 G/DL
ALP BLD-CCNC: 70 U/L
ALT SERPL-CCNC: 30 U/L
ANION GAP SERPL CALC-SCNC: 17 MMOL/L
APPEARANCE: CLEAR
AST SERPL-CCNC: 22 U/L
BASOPHILS # BLD AUTO: 0.05 K/UL
BASOPHILS NFR BLD AUTO: 0.9 %
BILIRUB SERPL-MCNC: 0.4 MG/DL
BILIRUBIN URINE: NEGATIVE
BLOOD URINE: NEGATIVE
BUN SERPL-MCNC: 17 MG/DL
CALCIUM SERPL-MCNC: 10 MG/DL
CHLORIDE SERPL-SCNC: 105 MMOL/L
CHOLEST SERPL-MCNC: 188 MG/DL
CO2 SERPL-SCNC: 20 MMOL/L
COLOR: YELLOW
CREAT SERPL-MCNC: 0.91 MG/DL
CREAT SPEC-SCNC: 223 MG/DL
EGFR: 109 ML/MIN/1.73M2
EOSINOPHIL # BLD AUTO: 0.1 K/UL
EOSINOPHIL NFR BLD AUTO: 1.9 %
ESTIMATED AVERAGE GLUCOSE: 189 MG/DL
FRUCTOSAMINE SERPL-MCNC: 254 UMOL/L
GLUCOSE QUALITATIVE U: NEGATIVE MG/DL
GLUCOSE SERPL-MCNC: 97 MG/DL
HBA1C MFR BLD HPLC: 8.2 %
HCT VFR BLD CALC: 45.9 %
HDLC SERPL-MCNC: 29 MG/DL
HGB BLD-MCNC: 14.2 G/DL
IMM GRANULOCYTES NFR BLD AUTO: 0.2 %
KETONES URINE: 15 MG/DL
LDLC SERPL CALC-MCNC: 129 MG/DL
LEUKOCYTE ESTERASE URINE: NEGATIVE
LYMPHOCYTES # BLD AUTO: 1.77 K/UL
LYMPHOCYTES NFR BLD AUTO: 33 %
MAN DIFF?: NORMAL
MCHC RBC-ENTMCNC: 29.5 PG
MCHC RBC-ENTMCNC: 30.9 GM/DL
MCV RBC AUTO: 95.4 FL
MICROALBUMIN 24H UR DL<=1MG/L-MCNC: 2 MG/DL
MICROALBUMIN/CREAT 24H UR-RTO: 9 MG/G
MONOCYTES # BLD AUTO: 0.5 K/UL
MONOCYTES NFR BLD AUTO: 9.3 %
NEUTROPHILS # BLD AUTO: 2.94 K/UL
NEUTROPHILS NFR BLD AUTO: 54.7 %
NITRITE URINE: NEGATIVE
NONHDLC SERPL-MCNC: 159 MG/DL
PH URINE: 5.5
PLATELET # BLD AUTO: 283 K/UL
POTASSIUM SERPL-SCNC: 4.1 MMOL/L
PROT SERPL-MCNC: 6.9 G/DL
PROTEIN URINE: NORMAL MG/DL
RBC # BLD: 4.81 M/UL
RBC # FLD: 14.1 %
SODIUM SERPL-SCNC: 142 MMOL/L
SPECIFIC GRAVITY URINE: 1.03
TRIGL SERPL-MCNC: 151 MG/DL
UROBILINOGEN URINE: 0.2 MG/DL
WBC # FLD AUTO: 5.37 K/UL

## 2023-04-27 RX ORDER — IBUPROFEN 600 MG/1
600 TABLET, FILM COATED ORAL
Qty: 21 | Refills: 0 | Status: COMPLETED | COMMUNITY
Start: 2023-03-23

## 2023-05-10 ENCOUNTER — APPOINTMENT (OUTPATIENT)
Dept: NEUROLOGY | Facility: CLINIC | Age: 43
End: 2023-05-10

## 2023-08-24 ENCOUNTER — APPOINTMENT (OUTPATIENT)
Dept: INTERNAL MEDICINE | Facility: CLINIC | Age: 43
End: 2023-08-24
Payer: MEDICAID

## 2023-08-24 VITALS
TEMPERATURE: 97.9 F | OXYGEN SATURATION: 99 % | HEIGHT: 67 IN | WEIGHT: 157 LBS | HEART RATE: 56 BPM | SYSTOLIC BLOOD PRESSURE: 126 MMHG | DIASTOLIC BLOOD PRESSURE: 79 MMHG | BODY MASS INDEX: 24.64 KG/M2

## 2023-08-24 DIAGNOSIS — R21 RASH AND OTHER NONSPECIFIC SKIN ERUPTION: ICD-10-CM

## 2023-08-24 DIAGNOSIS — E66.9 OBESITY, UNSPECIFIED: ICD-10-CM

## 2023-08-24 DIAGNOSIS — Z87.898 PERSONAL HISTORY OF OTHER SPECIFIED CONDITIONS: ICD-10-CM

## 2023-08-24 DIAGNOSIS — Z00.00 ENCOUNTER FOR GENERAL ADULT MEDICAL EXAMINATION W/OUT ABNORMAL FINDINGS: ICD-10-CM

## 2023-08-24 DIAGNOSIS — M54.32 SCIATICA, LEFT SIDE: ICD-10-CM

## 2023-08-24 PROCEDURE — 99212 OFFICE O/P EST SF 10 MIN: CPT | Mod: 25

## 2023-08-24 PROCEDURE — 99396 PREV VISIT EST AGE 40-64: CPT | Mod: 25

## 2023-08-24 RX ORDER — CERAMIDE 1,3,6-II/SALICYLIC/B3
CLEANSER (ML) TOPICAL 3 TIMES DAILY
Qty: 1 | Refills: 2 | Status: ACTIVE | COMMUNITY
Start: 2023-08-24 | End: 1900-01-01

## 2023-08-24 RX ORDER — GABAPENTIN 100 MG/1
100 CAPSULE ORAL
Qty: 60 | Refills: 2 | Status: COMPLETED | COMMUNITY
Start: 2023-04-26 | End: 2023-08-24

## 2023-08-24 RX ORDER — THIAMINE MONONITRATE (VIT B1) 100 MG
100 TABLET ORAL
Qty: 30 | Refills: 0 | Status: DISCONTINUED | COMMUNITY
Start: 2020-02-25 | End: 2023-08-24

## 2023-08-24 RX ORDER — ATORVASTATIN CALCIUM 80 MG/1
80 TABLET, FILM COATED ORAL
Refills: 0 | Status: DISCONTINUED | COMMUNITY
End: 2023-08-24

## 2023-08-24 RX ORDER — ORAL SEMAGLUTIDE 3 MG/1
3 TABLET ORAL
Qty: 30 | Refills: 3 | Status: DISCONTINUED | COMMUNITY
Start: 2023-04-27 | End: 2023-08-24

## 2023-08-24 NOTE — COUNSELING
[Fall prevention counseling provided] : Fall prevention counseling provided [Adequate lighting] : Adequate lighting [No throw rugs] : No throw rugs [Use proper foot wear] : Use proper foot wear [Use recommended devices] : Use recommended devices [Sleep ___ hours/day] : Sleep [unfilled] hours/day [Engage in a relaxing activity] : Engage in a relaxing activity [Plan in advance] : Plan in advance [Structured Weight Management Program suggested:] : Structured weight management program suggested [____ min/wk Activity] : [unfilled] min/wk activity [FreeTextEntry1] : healthy eating [FreeTextEntry2] : bmi 24  weight 157
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
## 2023-08-24 NOTE — PHYSICAL EXAM
[Well Developed] : well developed [Well Nourished] : well nourished [Conjunctiva] : the conjunctiva were normal in both eyes [PERRL] : pupils were equal in size, round, and reactive to light [EOM Intact] : extraocular movements were intact [Normal Appearance] : was normal in appearance [Neck Supple] : was supple [Rate ___] : at [unfilled] breaths per minute [Normal Rhythm/Effort] : normal respiratory rhythm and effort [Clear Bilaterally] : the lungs were clear to auscultation bilaterally [Normal to Percussion] : the lungs were normal to percussion [5th Left ICS - MCL] : palpated at the 5th LICS in the midclavicular line [Heart Rate ___] : [unfilled] bpm [Rhythm Regular] : regular [Normal Rate] : normal [Normal S1] : normal S1 [Normal S2] : normal S2 [No Murmur] : no murmurs heard [No Pitting Edema] : no pitting edema present [2+] : left 2+ [No Abnormalities] : the abdominal aorta was not enlarged and no bruit was heard [Examination Of The Breasts] : a normal appearance [No Discharge] : no discharge [Flat] : flat [Abdomen Hernia Incisional] : an incisional hernia was present [Soft, Nontender] : the abdomen was soft and nontender [No Mass] : no masses were palpated [No HSM] : no hepatosplenomegaly noted [None] : no hernias were palpable [No Lymphangitis] : no lymphangitis observed [No Visual Abnormalities] : no visible abnormalities [Normal Lordosis] : normal lordosis [No Scoliosis] : no scoliosis [No Tenderness to Palpation] : no spine tenderness on palpation [No Masses] : no masses [Full ROM] : full ROM [No Pain with ROM] : no pain with motion in any direction [Intact] : all reflexes within normal limits bilaterally [Normal Station and Gait] : the gait and station were normal [Normal Motor Tone] : the muscle tone was normal [Involuntary Movements] : no involuntary movements were seen [Normal Scalp] : inspection of the scalp showed no abnormalities [Examination Of The Hair] : texture and distribution of hair was normal [Complexion Medium] : medium complexion [Multiple Tattoos] : multiple tattoos observed [Normal] : the deep tendon reflexes were normal [Normal Mental Status] : the patient's orientation, memory, attention, language and fund of knowledge were normal [Appropriate] : appropriate [Comprehensive Foot Exam Normal] : Right and left foot were examined and both feet are normal. No ulcers in either foot. Toes are normal and with full ROM.  Normal tactile sensation with monofilament testing throughout both feet [Enlarged Diffusely] : was not enlarged [S3] : no S3 [S4] : no S4 [Rt] : no varicose veins of the right leg [Lt] : no varicose veins of the left leg [Right Carotid Bruit] : no bruit heard over the right carotid [Left Carotid Bruit] : no bruit heard over the left carotid [Right Femoral Bruit] : no bruit heard over the right femoral artery [Left Femoral Bruit] : no bruit heard over the left femoral artery [Bruit] : no bruit heard [Postauricular Lymph Nodes Enlarged Bilaterally] : nodes not enlarged [Preauricular Lymph Nodes Enlarged Bilaterally] : nodes not enlarged [Submandibular Lymph Nodes Enlarged Bilaterally] : nodes not enlarged [Suboccipital Lymph Nodes Enlarged Bilaterally] : nodes not enlarged [Submental Lymph Nodes Enlarged] : nodes not enlarged [Cervical Lymph Nodes Enlarged Posterior Bilaterally] : nodes not enlarged [Cervical Lymph Nodes Enlarged Anterior Bilaterally] : nodes not enlarged [Supraclavicular Lymph Nodes Enlarged Bilaterally] : nodes not enlarged [Axillary Lymph Nodes Enlarged Bilaterally] : nodes not enlarged [Epitrochlear Lymph Nodes Enlarged Bilaterally] : nodes not enlarged [Femoral Lymph Nodes Enlarged Bilaterally] : nodes not enlarged [Inguinal Lymph Nodes Enlarged Bilaterally] : nodes not enlarged [Abnormal Color] : normal color and pigmentation [Skin Lesions 1] : no skin lesions were observed [Skin Turgor Decreased] : normal skin turgor [Impaired judgment] : intact judgment [Impaired Insight] : intact insight [de-identified] : tongue normal teeth in good repair

## 2023-08-24 NOTE — ASSESSMENT
[FreeTextEntry1] : 1 health  He needs  dental exam and is up to date with eye exams  vaccines  will need psa  due to prostate enlargement  2  bmi 24   Weight loss, exercise, and diet control were discussed and are highly encouraged. Treatment options were given such as, aqua therapy, and contacting a nutritionist. Recommended to use the elliptical, stationary bike, less use of treadmill. Mindful eating was explained to the patient Obesity is associated with worsening asthma, shortness of breath, and potential for cardiac disease, diabetes, and other underlying medical conditions.    He had bariatric surg  and has stopped all medication and will recheck his levels for diabetes .  He needs to take  vitamins and minerals.  3  dm  ---The following has been discussed:--- -Targets for weight and HgA1c have been discussed with patient  -FS goals have been reviewed with the patient in detail: AM <130 post meal<160-180 -Diet and weight goals have been discussed with the patient in detail. -The importance of exercise in the treatment of diabetes has been discussed  with the patient in detail. -Extensive dietary advice provided to patient and the need to avoid concentrated  sweets/simple carbohydrates and to ensure to consume protein with each meal.  -Patient instructed to limit carbohydrates to 60 gms per meal and 15 gms per  snacks.  -Patient to keep a blood sugar log to check fasting and before meals -Patient instructed on importance of daily feet inspection and to reports any  open lesions to physician promptly 4 hld  to lower  LDL and non HDL  cholesterol levels     1 limit your intake of foods full of saturated fats  , trans fats, and dietary cholesterol .  Food with a lot of saturated fate include butter, fatty flesh like red meat, full fat and low fat dairy  products  , palm oil and coconut oil .   If you see partially hydrogenated fat in the ingredient  list of food label that food has trans fats.  Top sources of dietary chol include egg yolks , organ meats and shell fish.  one Type of fat omega 3 Fatty acids has been shown to protect against heart disease  . Good sources are cold water fish like salmon, mackerel , halibut ., trout  herring and sardines.     Limit  your intake of meat , poultry and fish to no more than 3.5  ounces per day     Eat a lot more fiber rich foods  like beans , oats, barley fruits and vegetables   .  Food naturally rich in soluble fiber  are good at lowering cholesterol .    Excellent choices  are  oats , oat bran , barley , peas , yams sweet potatoes and legumes  or beans .  Good fruit sources are berries passion fruit, oranges pears,, apricots , apples  and nectarines  .    choose protein rich plant foods   such as legumes or beans nuts and seeds over meat.     Lose as much weight as possible and exercise   Take plant sterol supplements   .A Daily intake  of 1-2 gms  of plant sterols  lowers ldl .    Best choice is supplements  such as Cholestoff  by natures made   because they dont have calories  sugar trans fats   an salt  of many foods enriched with plant sterols.    Take  Metamucil or psyllium   .   Studies have shown 9-10 gms as daily of psyllium   the equivalent of  about  3 teaspoons  of sugar free Metamucil   reduced LDL levels  . 5.rash  willrefer to derm also start aveeno and triamcinolone 6. prostate enlarge fu with urologist  7  sleep apnea  no longer  using cpap  he has lost weight  and no longer snoring   8 fatty livr since weight loss will recheck his us  to see if it has resolved. Reviewed the spectrum of disease, the risk of disease progression to developing cirrhosis and the associated complications. Explained the patient may develop liver cancer without cirrhosis and therefore should be under the yearly surveillance with an abdominal ultrasound. Taught back that the best treatment of fatty liver disease is diet and exercise. Discussed the present diet with the patient and recommended the avoidance of fatty foods and to follow a heart healthy diet. Also explained that weight loss may lead to an improvement in the overall underlying liver disease. Taught back the physiology of alcohol abstinence has a important contribution to liver health.

## 2023-08-24 NOTE — HEALTH RISK ASSESSMENT
[Very Good] : ~his/her~  mood as very good [No] : In the past 12 months have you used drugs other than those required for medical reasons? No [No falls in past year] : Patient reported no falls in the past year [0] : 2) Feeling down, depressed, or hopeless: Not at all (0) [PHQ-2 Negative - No further assessment needed] : PHQ-2 Negative - No further assessment needed [HIV test declined] : HIV test declined [Hepatitis C test offered] : Hepatitis C test offered [None] : None [With Family] : lives with family [# of Members in Household ___] :  household currently consist of [unfilled] member(s) [Employed] : employed [College] : College [] :  [# Of Children ___] : has [unfilled] children [Sexually Active] : sexually active [Feels Safe at Home] : Feels safe at home [Fully functional (bathing, dressing, toileting, transferring, walking, feeding)] : Fully functional (bathing, dressing, toileting, transferring, walking, feeding) [Fully functional (using the telephone, shopping, preparing meals, housekeeping, doing laundry, using] : Fully functional and needs no help or supervision to perform IADLs (using the telephone, shopping, preparing meals, housekeeping, doing laundry, using transportation, managing medications and managing finances) [Smoke Detector] : smoke detector [Carbon Monoxide Detector] : carbon monoxide detector [Safety elements used in home] : safety elements used in home [Seat Belt] :  uses seat belt [Sunscreen] : uses sunscreen [Name: ___] : Health Care Proxy's Name: [unfilled]  [Relationship: ___] : Relationship: [unfilled] [Former] : Former [FreeTextEntry1] : rash on legs  [de-identified] : exercises 3-4  times a week exercises a nd walks in between  13,000 stepes a day [de-identified] : prtoen and vegetables  [[id number]] : 0 [Change in mental status noted] : No change in mental status noted [Language] : denies difficulty with language [Behavior] : denies difficulty with behavior [Learning/Retaining New Information] : denies difficulty learning/retaining new information [Handling Complex Tasks] : denies difficulty handling complex tasks [Reasoning] : denies difficulty with reasoning [Spatial Ability and Orientation] : denies difficulty with spatial ability and orientation [Reports changes in hearing] : Reports no changes in hearing [Reports changes in vision] : Reports no changes in vision [Reports changes in dental health] : Reports no changes in dental health [Guns at Home] : no guns at home [Travel to Developing Areas] : does not  travel to developing areas [TB Exposure] : is not being exposed to tuberculosis [Caregiver Concerns] : does not have caregiver concerns [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

## 2023-08-26 LAB
25(OH)D3 SERPL-MCNC: 74.5 NG/ML
ALBUMIN SERPL ELPH-MCNC: 4.8 G/DL
ALP BLD-CCNC: 88 U/L
ALT SERPL-CCNC: 15 U/L
ANION GAP SERPL CALC-SCNC: 11 MMOL/L
AST SERPL-CCNC: 18 U/L
BILIRUB SERPL-MCNC: 0.5 MG/DL
BUN SERPL-MCNC: 18 MG/DL
CALCIUM SERPL-MCNC: 9.9 MG/DL
CHLORIDE SERPL-SCNC: 104 MMOL/L
CHOLEST SERPL-MCNC: 175 MG/DL
CO2 SERPL-SCNC: 25 MMOL/L
CREAT SERPL-MCNC: 0.94 MG/DL
CREAT SPEC-SCNC: 151 MG/DL
EGFR: 105 ML/MIN/1.73M2
ESTIMATED AVERAGE GLUCOSE: 114 MG/DL
FOLATE SERPL-MCNC: 13.5 NG/ML
FRUCTOSAMINE SERPL-MCNC: 235 UMOL/L
GLUCOSE SERPL-MCNC: 103 MG/DL
HBA1C MFR BLD HPLC: 5.6 %
HDLC SERPL-MCNC: 43 MG/DL
LDLC SERPL CALC-MCNC: 116 MG/DL
MAGNESIUM SERPL-MCNC: 2.1 MG/DL
MICROALBUMIN 24H UR DL<=1MG/L-MCNC: 1.3 MG/DL
MICROALBUMIN/CREAT 24H UR-RTO: 9 MG/G
NONHDLC SERPL-MCNC: 133 MG/DL
POTASSIUM SERPL-SCNC: 4.3 MMOL/L
PROT SERPL-MCNC: 6.9 G/DL
PSA SERPL-MCNC: 1.28 NG/ML
SODIUM SERPL-SCNC: 140 MMOL/L
TRIGL SERPL-MCNC: 92 MG/DL
VIT B12 SERPL-MCNC: 865 PG/ML

## 2023-08-28 LAB — SELENIUM SERPL-MCNC: 146 UG/L

## 2023-08-30 LAB
VIT B1 SERPL-MCNC: 126.9 NMOL/L
VIT B6 SERPL-MCNC: 26.1 UG/L

## 2023-09-04 LAB — COPPER SERPL-MCNC: 90 UG/DL

## 2024-01-04 ENCOUNTER — APPOINTMENT (OUTPATIENT)
Dept: INTERNAL MEDICINE | Facility: CLINIC | Age: 44
End: 2024-01-04
Payer: MEDICAID

## 2024-01-04 VITALS
DIASTOLIC BLOOD PRESSURE: 76 MMHG | HEART RATE: 65 BPM | OXYGEN SATURATION: 99 % | TEMPERATURE: 97.3 F | SYSTOLIC BLOOD PRESSURE: 125 MMHG | WEIGHT: 162.4 LBS | HEIGHT: 67 IN | BODY MASS INDEX: 25.49 KG/M2

## 2024-01-04 DIAGNOSIS — R33.9 RETENTION OF URINE, UNSPECIFIED: ICD-10-CM

## 2024-01-04 DIAGNOSIS — R31.29 OTHER MICROSCOPIC HEMATURIA: ICD-10-CM

## 2024-01-04 DIAGNOSIS — N40.0 BENIGN PROSTATIC HYPERPLASIA WITHOUT LOWER URINARY TRACT SYMPMS: ICD-10-CM

## 2024-01-04 DIAGNOSIS — R39.11 HESITANCY OF MICTURITION: ICD-10-CM

## 2024-01-04 PROCEDURE — 90686 IIV4 VACC NO PRSV 0.5 ML IM: CPT

## 2024-01-04 PROCEDURE — G0008: CPT

## 2024-01-04 PROCEDURE — 99214 OFFICE O/P EST MOD 30 MIN: CPT | Mod: 25

## 2024-01-04 RX ORDER — TRIAMCINOLONE ACETONIDE 1 MG/G
0.1 OINTMENT TOPICAL
Qty: 1 | Refills: 1 | Status: DISCONTINUED | COMMUNITY
Start: 2023-08-24 | End: 2024-01-04

## 2024-01-04 NOTE — PLAN
[FreeTextEntry1] : 1 constipation  Fiber  is a substance found in plants. Dietary fiber is a type of carbohydrate we eat. Eating the right amount of fiber has been shown to have a range of health benefits. It helps you feel full longer, which can curb overeating and weight gain. Eating fiber-rich foods aids in digestion  and the absorption of nutrients.  Foods that are high in fiber can help treat certain issues. These include constipation, irritable bowel  syndrome (IBS), and diverticulitis. Dietary fiber can reduce your risk of coronary heart  disease, type 2 diabetes, and some cancers. It also may lower your cholesterol.  Path to improved health  The amount of fiber you should get from your daily diet  depends on your age and gender. Men 50 years of age and younger should consume at least 38 grams of fiber per day. Men older than 50 years of age should get at least 30 grams of fiber daily. Women 50 years of age and younger should consume at least 25 grams of fiber per day. Women older than 50 years of age should get at least 21 grams of fiber daily.  The following changes can increase the fiber in your diet. 1.Eat at least 2 cups of fruits and 2 1/2 cups of vegetables each day. This can include: 1 artichoke 1 medium sweet potato 1 small pear  cup of green peas  cup of berries, such as raspberries or blackberries  cup of prunes  cup figs or dates  cup of spinach 1 medium apple 1 medium orange. 2.Replace refined white bread with whole-grain breads and cereals. Choose brown rice instead of white rice. Eat the following foods: 100% whole-wheat bread oatmeal brown rice bran muffins bran or multiple-grain cereals, cooked or dry popcorn (unbuttered) almonds. 3.Check nutrition fact labels for the amount of dietary fiber. Try to get 5 grams of fiber per serving. 4.Add  cup of wheat bran (millers bran) to foods. You can put it in cooked cereal, applesauce, or meat loaf. 5.Eat  cup cooked beans, such as navy, kidney, ruiz, black, lima, or white beans.  Things to consider  Try not to add too much fiber to your diet at once. You may get symptoms such as bloating, cramping, or gas. You can prevent these by increasing your fiber slowly. Start with one of the changes listed above. Wait several days to a week before making another. If one change doesnt seem to work for you, try a different one. Be sure to drink more fluids as you increase the amount of fiber you eat. Fluids help your body digest fiber. Try to drink 8 glasses a day. Choose no- or low-calorie beverages, such as water, unsweetened tea, or diet soda. 2  urinary obstruciont due to prostate enalrgment  fu with urologist  possible posd void residual volume    he actual cause of prostate enlargement is unknown. Factors linked to aging and changes in the cells of the testicles may have a role in the growth of the gland. Men who have had their testicles removed at a young age (for example, as a result of testicular cancer) do not develop BPH.  Also, if the testicles are removed after a man develops BPH, the prostate begins to shrink in size.  Some facts about prostate enlargement: The likelihood of developing an enlarged prostate increases with age. BPH is so common that it has been said all men will have an enlarged prostate if they live long enough. A small amount of prostate enlargement is present in many men over age 40. More than 90% of men over age 80 have the condition. No risk factors have been identified other than having normally functioning testicles.         Symptoms       Less than half of all men with BPH have symptoms of the disease. Symptoms may include: Dribbling at the end of urinating Inability to urinate (urinary retention) Incomplete emptying of your bladder Incontinence Needing to urinate 2 or more times per night Pain with urination or bloody urine (these may indicate infection) Slowed or delayed start of the urinary stream Straining to urinate Strong and sudden urge to urinate Weak urine stream       Exams and Tests       Your health care provider will ask you questions about your medical history and do a digital rectal exam to feel the prostate gland. Other tests you may have include:  Urine flow rate Post-void residual urine test to see how much urine is left in your bladder after you urinate Pressure-flow studies to measure the pressure in the bladder as you urinate Urinalysis to check for blood or infection Urine culture to check for infection Prostate-specific antigen (PSA) blood test to screen for prostate cancer Cystoscopy  You may be asked to fill out a form to rate how bad your symptoms are and how much they affect your daily life. Your doctor can use this score to  if your condition is getting worse over time.      Treatment       The treatment you choose will be based on how bad your symptoms are and how much they bother you. Your provider will also take into account other medical problems you may have.  Treatment options include "watchful waiting," lifestyle changes, medicines, or surgery.  If you are over 60, you are more likely to have symptoms. But many men with an enlarged prostate have only minor symptoms. Self-care steps are often enough to make you feel better.  If you have BPH, you should have a yearly exam to monitor your symptoms and see if you need changes in treatment.   SELF-CARE  For mild symptoms: Urinate when you first get the urge. Also, go to the bathroom on a timed schedule even if you don't feel a need to urinate. Avoid alcohol and caffeine, especially after dinner. DO NOT drink a lot of fluid all at once. Spread out fluids during the day. Avoid drinking fluids within 2 hours of bedtime. Try NOT to take over-the-counter cold and sinus medicines that contain decongestants or antihistamines. These drugs can increase BPH symptoms. Keep warm and exercise regularly. Cold weather and lack of physical activity may worsen symptoms. Learn and perform Kegel exercises (pelvic strengthening exercises). Reduce stress. Nervousness and tension can lead to more frequent urination.   MEDICINES  Alpha-1 blockers are a class of drugs that are also used to treat high blood pressure. These medicines relax the muscles of the bladder neck and prostate. This allows easier urination. Most people who take alpha-1 blockers notice improvement in their symptoms.  Finasteride and dutasteride lower levels of hormones produced by the prostate. These drugs also reduce the size of the gland, increase urine flow rate, and decrease symptoms of BPH. You may need to take these medicines for 3 to 6 months before you notice symptoms getting better. Possible side effects include decreased sex drive and impotence.  Antibiotics may be prescribed to treat chronic prostatitis (inflammation of the prostate), which may occur with BPH. BPH symptoms improve in some men after a course of antibiotics.  Watch out for drugs that may make your symptoms worse:  SAW PALMETTO  Many herbs have been tried for treating an enlarged prostate. Many men use saw palmetto to ease symptoms. Some studies have shown that it may help with symptoms, but results are mixed and more research is needed. If you use saw palmetto and think it works, ask your doctor if you should still take it.  SURGERY  Prostate surgery may be recommended if you have: Incontinence Recurrent blood in the urine Inability to fully empty the bladder (urinary retention) Recurrent urinary tract infections Decreasing kidney function Bladder stones Bothersome symptoms not responding to medicines   The choice of which surgical procedure is recommended is most often based on the severity of your symptoms and the size and shape of your prostate gland. Most men who have prostate surgery have improvement in urine flow rates and symptoms.  Transurethral resection of the prostate (TURP): This is the most common and most proven surgical treatment for BPH. TURP is performed by inserting a scope through the penis and removing the prostate piece by piece.  Simple prostatectomy: It is a procedure to remove the inside part of the prostate gland. It is done through a surgical cut in your lower belly. This treatment is most often done on men who have very large prostate glands.   Other, less-invasive procedures use heat to destroy prostate tissue. None have been proven to be better than TURP. People who receive these procedures are more likely to need surgery again after 5 or 10 years. However, these procedures may be a choice for: Younger men (many of the less-invasive procedures carry a lower risk for impotence and incontinence than TURP, although the risk with TURP is not very high) Older people People with severe medical conditions, including uncontrolled diabetes, cirrhosis, alcoholism, psychosis, and serious lung, kidney, or heart disease Men who are taking blood-thinning drugs       Support Groups       Some men may find it helpful to take part in a BPH support groups.       Possible Complications       Men who have had BPH for long time with slowly worsening symptom may develop: Sudden inability to urinate Urinary tract infections Urinary stones Damage to the kidneys Blood in the urine   BPH may come back over time even after having surgery.       When to Contact a Medical Professional       Call your provider right away if you have: Less urine than usual Fever or chills Back, side, or abdominal pain Blood or pus in your urine   Also call if: Your bladder does not feel completely empty after you urinate. You take medicines that may cause urinary problems such as diuretics, antihistamines, antidepressants, or sedatives. DO NOT stop or change your medicines without talking to your doctor. You have tried self-care steps for 2 months and symptoms have not improved.  Alternative Names BPH; Benign prostatic hyperplasia (hypertrophy); Prostate - enlarged Patient Instructions Enlarged prostate - what to ask your doctor  Prostate resection - minimally invasive - discharge Transurethral resection of the prostate - discharge 3 bmi 25   Weight loss, exercise, and diet control were discussed and are highly encouraged. Treatment options were given such as, aqua therapy, and contacting a nutritionist. Recommended to use the elliptical, stationary bike, less use of treadmill. Mindful eating was explained to the patient Obesity is associated with worsening asthma, shortness of breath, and potential for cardiac disease, diabetes, and other underlying medical conditions. -Nutrition and lifestyle concepts reviewed in detail. Recommending an unprocessed diet with >= 50% of nutrients from whole plant sources; most food early in the day; most calories before 4 pm, and no food after 8 pm; advised starting with small sustainable changes and building up over time. The long-term plan for this type of dietary change was reviewed. A number of resources to help in initiating these changes were provided. -A particular emphasis was placed on the need to remove processed foods from the diet. The concept of insulin resistance was introduced as important for understanding effective weight loss measures. Educational handouts explaining these concepts were provided. 4 fatty liver us abd  fu Fatty Liver: The patient denies any jaundice or pruritus. The patient denies any alcohol use. The patient denies taking large doses of nonsteroidal anti-inflammatory drugs or acetaminophen. The findings are suggestive of fatty liver. The patient and I had a long discussion regarding the risks of fatty liver progressing to cirrhosis. The patient was told of the possible increased risk of developing liver failure, cirrhosis, ascites, GI bleeding secondary to varices, hepatic encephalopathy, bleeding tendencies and liver cancer. The patient was told of the importance of follow-up. The patient was advised to follow up every 6 months for blood work and imaging studies. The patient agreed and will follow up. The patient was advised to lose weight. I recommend a trial of vitamin E supplementation for the fatty liver. If the liver enzymes remain elevated, the patient may require a trial of Pioglitazone for the fatty liver. I recommend avoid alcohol and hepato-toxic agents. The patient was also advised to avoid NSAIDs, Acetaminophen and any other hepatotoxic drugs. The patient was also advised not to share needles, razors, scissors, nail clippers, etc.. The patient is to continue close follow-up in our office for blood work and exams. If the liver enzymes remain elevated, the patient may require a CT guided liver biopsy to assess the liver parenchyma and for possible treatment. We had a long discussion regarding the risks and benefits of the procedure. The patient was told of the risks of bleeding, perforation, infections, emergency surgery and missing lesions. The patient agreed and will follow-up to reassess the symptoms Patient has been counseled on life style interventions, including but not limited to: weight loss (3-5% loss of body weight might improve fat in the liver, while 7-10% needed for potential improvement of other components, including inflammation and scarring of the liver, called fibrosis); healthy diet, avoiding added sugars, sodas, avoiding saturated fats, limiting sodium, avoiding alcohol; and on the importance of regular exercise (> 150 min/week moderate intensity aerobic exercise with at least 2x/week muscle strengthening or exercise as tolerated).  - I explained to patient the natural Hx of NAFLD.  5  dm  fu [id number]  ---The following has been discussed:--- -Targets for weight and HgA1c have been discussed with patient  -FS goals have been reviewed with the patient in detail: AM <130 post meal<160-180 -Diet and weight goals have been discussed with the patient in detail. -The importance of exercise in the treatment of diabetes has been discussed  with the patient in detail. -Extensive dietary advice provided to patient and the need to avoid concentrated  sweets/simple carbohydrates and to ensure to consume protein with each meal.  -Patient instructed to limit carbohydrates to 60 gms per meal and 15 gms per  snacks.  -Patient to keep a blood sugar log to check fasting and before meals -Patient instructed on importance of daily feet inspection and to reports any  open lesions to physician promptly 6 hld  to lower  LDL and non HDL  cholesterol levels     1 limit your intake of foods full of saturated fats  , trans fats, and dietary cholesterol .  Food with a lot of saturated fate include butter, fatty flesh like red meat, full fat and low fat dairy  products  , palm oil and coconut oil .   If you see partially hydrogenated fat in the ingredient  list of food label that food has trans fats.  Top sources of dietary chol include egg yolks , organ meats and shell fish.  one Type of fat omega 3 Fatty acids has been shown to protect against heart disease  . Good sources are cold water fish like salmon, mackerel , halibut ., trout  herring and sardines.     Limit  your intake of meat , poultry and fish to no more than 3.5  ounces per day     Eat a lot more fiber rich foods  like beans , oats, barley fruits and vegetables   .  Food naturally rich in soluble fiber  are good at lowering cholesterol .    Excellent choices  are  oats , oat bran , barley , peas , yams sweet potatoes and legumes  or beans .  Good fruit sources are berries passion fruit, oranges pears,, apricots , apples  and nectarines  .    choose protein rich plant foods   such as legumes or beans nuts and seeds over meat.     Lose as much weight as possible and exercise   Take plant sterol supplements   .A Daily intake  of 1-2 gms  of plant sterols  lowers ldl .    Best choice is supplements  such as Cholestoff  by natures made   because they dont have calories  sugar trans fats   an salt  of many foods enriched with plant sterols.    Take  Metamucil or psyllium   .   Studies have shown 9-10 gms as daily of psyllium   the equivalent of  about  3 teaspoons  of sugar free Metamucil   reduced LDL levels  .

## 2024-01-04 NOTE — PHYSICAL EXAM
[No Acute Distress] : no acute distress [Well Nourished] : well nourished [Well Developed] : well developed [Well-Appearing] : well-appearing [Normal Sclera/Conjunctiva] : normal sclera/conjunctiva [PERRL] : pupils equal round and reactive to light [EOMI] : extraocular movements intact [Normal Outer Ear/Nose] : the outer ears and nose were normal in appearance [Normal Oropharynx] : the oropharynx was normal [No JVD] : no jugular venous distention [No Lymphadenopathy] : no lymphadenopathy [Supple] : supple [Thyroid Normal, No Nodules] : the thyroid was normal and there were no nodules present [No Respiratory Distress] : no respiratory distress  [No Accessory Muscle Use] : no accessory muscle use [Clear to Auscultation] : lungs were clear to auscultation bilaterally [Normal Rate] : normal rate  [Regular Rhythm] : with a regular rhythm [Normal S1, S2] : normal S1 and S2 [No Murmur] : no murmur heard [No Carotid Bruits] : no carotid bruits [No Abdominal Bruit] : a ~M bruit was not heard ~T in the abdomen [No Varicosities] : no varicosities [Pedal Pulses Present] : the pedal pulses are present [No Edema] : there was no peripheral edema [No Palpable Aorta] : no palpable aorta [No Extremity Clubbing/Cyanosis] : no extremity clubbing/cyanosis [Soft] : abdomen soft [Non Tender] : non-tender [Non-distended] : non-distended [No Masses] : no abdominal mass palpated [No HSM] : no HSM [Normal Bowel Sounds] : normal bowel sounds [Normal Posterior Cervical Nodes] : no posterior cervical lymphadenopathy [Normal Anterior Cervical Nodes] : no anterior cervical lymphadenopathy [No CVA Tenderness] : no CVA  tenderness [No Spinal Tenderness] : no spinal tenderness [No Joint Swelling] : no joint swelling [Grossly Normal Strength/Tone] : grossly normal strength/tone [No Rash] : no rash [Coordination Grossly Intact] : coordination grossly intact [No Focal Deficits] : no focal deficits [Normal Gait] : normal gait [Deep Tendon Reflexes (DTR)] : deep tendon reflexes were 2+ and symmetric [Normal Affect] : the affect was normal [Normal Insight/Judgement] : insight and judgment were intact [Normal] : affect was normal and insight and judgment were intact [Comprehensive Foot Exam Normal] : Right and left foot were examined and both feet are normal. No ulcers in either foot. Toes are normal and with full ROM.  Normal tactile sensation with monofilament testing throughout both feet [de-identified] : he doesnt want prostate exam

## 2024-01-04 NOTE — HISTORY OF PRESENT ILLNESS
[FreeTextEntry1] : fu [de-identified] : Pt is sp bariactric surgery and no longer needs medication for  triglycerides or diabetes . he states he has been constipated lately he has 4 bm a week. He states it started last week.   He drinks 7-8 glasses of water in a day.  he is not eating  fiber  and small amount of fruits and vegetables sporadically. he has gained 5 lbs since Aug. bmi 25.   He  -denies any headaches, nausea, vomiting, fever, chills, sweats, chest pain, chest pressure, diarrhea, dysphagia, sour taste in the mouth, dizziness, leg swelling, leg pain, myalgias, arthralgias, itchy eyes, itchy ears, heartburn, or reflux.  He states lately he noticed after urination he needs to urinate again and is not fully  emptying his bladder  . he is sexually acitive and no problmes with ejaculation .  He has to push the urine out to start urination  . This started for last two months.

## 2024-01-04 NOTE — COUNSELING
[Sleep ___ hours/day] : Sleep [unfilled] hours/day [Engage in a relaxing activity] : Engage in a relaxing activity [Structured Weight Management Program suggested:] : Structured weight management program suggested [Weigh Self Weekly] : weigh self weekly [Decrease Portions] : decrease portions [____ min/wk Activity] : [unfilled] min/wk activity [Keep Food Diary] : keep food diary [FreeTextEntry1] : low calories  [FreeTextEntry2] : bmi 25

## 2024-01-04 NOTE — HEALTH RISK ASSESSMENT
[No] : No [No falls in past year] : Patient reported no falls in the past year [Little interest or pleasure doing things] : 1) Little interest or pleasure doing things [Feeling down, depressed, or hopeless] : 2) Feeling down, depressed, or hopeless [0] : 2) Feeling down, depressed, or hopeless: Not at all (0) [PHQ-2 Negative - No further assessment needed] : PHQ-2 Negative - No further assessment needed [Never] : Never [de-identified] : exercises regularly walks 60 mins a day and 17,000 steps a day [de-identified] : none  [[id number]] : 0

## 2024-01-06 LAB
ALBUMIN SERPL ELPH-MCNC: 4.8 G/DL
ALP BLD-CCNC: 81 U/L
ALT SERPL-CCNC: 14 U/L
ANION GAP SERPL CALC-SCNC: 14 MMOL/L
APPEARANCE: CLEAR
AST SERPL-CCNC: 21 U/L
BASOPHILS # BLD AUTO: 0.04 K/UL
BASOPHILS NFR BLD AUTO: 0.8 %
BILIRUB SERPL-MCNC: 0.3 MG/DL
BILIRUBIN URINE: NEGATIVE
BLOOD URINE: NEGATIVE
BUN SERPL-MCNC: 18 MG/DL
CALCIUM SERPL-MCNC: 9.6 MG/DL
CHLORIDE SERPL-SCNC: 103 MMOL/L
CHOLEST SERPL-MCNC: 188 MG/DL
CO2 SERPL-SCNC: 25 MMOL/L
COLOR: YELLOW
CREAT SERPL-MCNC: 0.92 MG/DL
EGFR: 107 ML/MIN/1.73M2
EOSINOPHIL # BLD AUTO: 0.08 K/UL
EOSINOPHIL NFR BLD AUTO: 1.5 %
ESTIMATED AVERAGE GLUCOSE: 120 MG/DL
FRUCTOSAMINE SERPL-MCNC: 241 UMOL/L
GLUCOSE QUALITATIVE U: NEGATIVE MG/DL
GLUCOSE SERPL-MCNC: 94 MG/DL
HBA1C MFR BLD HPLC: 5.8 %
HCT VFR BLD CALC: 42.5 %
HDLC SERPL-MCNC: 47 MG/DL
HGB BLD-MCNC: 14.4 G/DL
IMM GRANULOCYTES NFR BLD AUTO: 0.2 %
KETONES URINE: NEGATIVE MG/DL
LDLC SERPL CALC-MCNC: 120 MG/DL
LEUKOCYTE ESTERASE URINE: NEGATIVE
LYMPHOCYTES # BLD AUTO: 1.73 K/UL
LYMPHOCYTES NFR BLD AUTO: 33.1 %
MAN DIFF?: NORMAL
MCHC RBC-ENTMCNC: 31.4 PG
MCHC RBC-ENTMCNC: 33.9 GM/DL
MCV RBC AUTO: 92.6 FL
MONOCYTES # BLD AUTO: 0.35 K/UL
MONOCYTES NFR BLD AUTO: 6.7 %
NEUTROPHILS # BLD AUTO: 3.02 K/UL
NEUTROPHILS NFR BLD AUTO: 57.7 %
NITRITE URINE: NEGATIVE
NONHDLC SERPL-MCNC: 141 MG/DL
PH URINE: 6
PLATELET # BLD AUTO: 280 K/UL
POTASSIUM SERPL-SCNC: 4.3 MMOL/L
PROT SERPL-MCNC: 6.8 G/DL
PROTEIN URINE: NEGATIVE MG/DL
PSA FREE FLD-MCNC: 25 %
PSA FREE SERPL-MCNC: 0.33 NG/ML
PSA SERPL-MCNC: 1.35 NG/ML
RBC # BLD: 4.59 M/UL
RBC # FLD: 13.3 %
SODIUM SERPL-SCNC: 142 MMOL/L
SPECIFIC GRAVITY URINE: 1.02
TRIGL SERPL-MCNC: 115 MG/DL
UROBILINOGEN URINE: 1 MG/DL
WBC # FLD AUTO: 5.23 K/UL

## 2024-01-11 ENCOUNTER — APPOINTMENT (OUTPATIENT)
Dept: ULTRASOUND IMAGING | Facility: CLINIC | Age: 44
End: 2024-01-11
Payer: COMMERCIAL

## 2024-01-11 PROCEDURE — 76700 US EXAM ABDOM COMPLETE: CPT

## 2024-01-18 ENCOUNTER — NON-APPOINTMENT (OUTPATIENT)
Age: 44
End: 2024-01-18

## 2024-01-26 ENCOUNTER — APPOINTMENT (OUTPATIENT)
Dept: UROLOGY | Facility: CLINIC | Age: 44
End: 2024-01-26
Payer: COMMERCIAL

## 2024-01-26 VITALS
BODY MASS INDEX: 25.11 KG/M2 | DIASTOLIC BLOOD PRESSURE: 75 MMHG | HEART RATE: 66 BPM | HEIGHT: 67 IN | OXYGEN SATURATION: 98 % | TEMPERATURE: 97.4 F | SYSTOLIC BLOOD PRESSURE: 124 MMHG | WEIGHT: 160 LBS

## 2024-01-26 DIAGNOSIS — R39.15 URGENCY OF URINATION: ICD-10-CM

## 2024-01-26 DIAGNOSIS — N52.9 MALE ERECTILE DYSFUNCTION, UNSPECIFIED: ICD-10-CM

## 2024-01-26 DIAGNOSIS — N40.1 BENIGN PROSTATIC HYPERPLASIA WITH LOWER URINARY TRACT SYMPMS: ICD-10-CM

## 2024-01-26 DIAGNOSIS — N13.8 BENIGN PROSTATIC HYPERPLASIA WITH LOWER URINARY TRACT SYMPMS: ICD-10-CM

## 2024-01-26 PROCEDURE — 99214 OFFICE O/P EST MOD 30 MIN: CPT

## 2024-01-26 RX ORDER — TADALAFIL 5 MG/1
5 TABLET ORAL
Qty: 90 | Refills: 1 | Status: ACTIVE | COMMUNITY
Start: 2024-01-26 | End: 1900-01-01

## 2024-01-26 NOTE — ASSESSMENT
[FreeTextEntry1] : Mr. Domínguez is a very pleasant 43 year old man here today for urinary urgency, postvoid dribbling and ED. He reports increased urgency and frequency for the past 3 months. He reports that his stream is strong. Reports occasional post void dribbling. He denies any hematuria, dysuria or nocturia. ED for the past 7 months. Reports that his erections aren't as strong as they have been in the past and that they loose strength rapidly. He reports never trying tadalafil. HgbA1c 5.8%. PSA 1.35. Cr 0.92. Trial 5 mg tadalafil. I discussed the risks, benefits, alternatives, and possible side effects of Cialis (tadalafil) therapy with the patient, including but not limited to headache, flushing, upset stomach, blurry vision, change in color vision, vision loss, and priapism with the patient. We discussed options for management of urinary urgency, postvoid dribbling, at this time he wishes to avoid medication.  We discussed over-the-counter natural supplements which may benefit him and improve his symptoms Trial saw palmetto. RTO in 1 month.

## 2024-01-26 NOTE — HISTORY OF PRESENT ILLNESS
[Currently Experiencing ___] :  [unfilled] [Urinary Urgency] : urinary urgency [Urinary Frequency] : urinary frequency [Post-Void Dribbling] : post-void dribbling [Erectile Dysfunction] : Erectile Dysfunction [None] : None [FreeTextEntry1] : Mr. Domínguez is a very pleasant 43 year old man here today for urinary urgency and ED. He reports increased urgency and frequency for the past 3 months. He reports that his stream is strong. Reports occasional post void dribbling. He denies any hematuria, dysuria or nocturia. ED for the past 7 months. Reports that his erections aren't as strong as they have been in the past and that they loose strength rapidly. He reports never trying tadalafil.

## 2024-04-05 ENCOUNTER — APPOINTMENT (OUTPATIENT)
Dept: UROLOGY | Facility: CLINIC | Age: 44
End: 2024-04-05

## 2024-05-09 ENCOUNTER — APPOINTMENT (OUTPATIENT)
Dept: INTERNAL MEDICINE | Facility: CLINIC | Age: 44
End: 2024-05-09
Payer: COMMERCIAL

## 2024-05-09 VITALS
OXYGEN SATURATION: 98 % | DIASTOLIC BLOOD PRESSURE: 90 MMHG | HEIGHT: 67 IN | TEMPERATURE: 97.9 F | SYSTOLIC BLOOD PRESSURE: 136 MMHG | BODY MASS INDEX: 25.11 KG/M2 | WEIGHT: 160 LBS | RESPIRATION RATE: 16 BRPM | HEART RATE: 87 BPM

## 2024-05-09 DIAGNOSIS — E88.810 METABOLIC SYNDROME: ICD-10-CM

## 2024-05-09 DIAGNOSIS — Z86.69 PERSONAL HISTORY OF OTHER DISEASES OF THE NERVOUS SYSTEM AND SENSE ORGANS: ICD-10-CM

## 2024-05-09 DIAGNOSIS — Z86.39 PERSONAL HISTORY OF OTHER ENDOCRINE, NUTRITIONAL AND METABOLIC DISEASE: ICD-10-CM

## 2024-05-09 DIAGNOSIS — K59.00 CONSTIPATION, UNSPECIFIED: ICD-10-CM

## 2024-05-09 DIAGNOSIS — K76.0 FATTY (CHANGE OF) LIVER, NOT ELSEWHERE CLASSIFIED: ICD-10-CM

## 2024-05-09 DIAGNOSIS — E78.1 PURE HYPERGLYCERIDEMIA: ICD-10-CM

## 2024-05-09 DIAGNOSIS — R21 RASH AND OTHER NONSPECIFIC SKIN ERUPTION: ICD-10-CM

## 2024-05-09 DIAGNOSIS — E78.2 MIXED HYPERLIPIDEMIA: ICD-10-CM

## 2024-05-09 DIAGNOSIS — E11.9 TYPE 2 DIABETES MELLITUS W/OUT COMPLICATIONS: ICD-10-CM

## 2024-05-09 PROCEDURE — 99213 OFFICE O/P EST LOW 20 MIN: CPT

## 2024-05-09 RX ORDER — POLYETHYLENE GLYCOL 3350 17 G/17G
17 POWDER, FOR SOLUTION ORAL DAILY
Qty: 1 | Refills: 3 | Status: COMPLETED | COMMUNITY
Start: 2024-01-04 | End: 2024-05-09

## 2024-05-09 RX ORDER — BLOOD-GLUCOSE TRANSMITTER
EACH MISCELLANEOUS
Qty: 1 | Refills: 3 | Status: DISCONTINUED | COMMUNITY
Start: 2021-04-01 | End: 2024-05-09

## 2024-05-09 RX ORDER — BLOOD-GLUCOSE SENSOR
EACH MISCELLANEOUS
Qty: 3 | Refills: 3 | Status: DISCONTINUED | COMMUNITY
Start: 2021-04-01 | End: 2024-05-09

## 2024-05-09 RX ORDER — BLOOD-GLUCOSE,RECEIVER,CONT
EACH MISCELLANEOUS
Qty: 1 | Refills: 0 | Status: DISCONTINUED | COMMUNITY
Start: 2021-04-01 | End: 2024-05-09

## 2024-05-09 NOTE — HEALTH RISK ASSESSMENT
[Yes] : Yes [Monthly or less (1 pt)] : Monthly or less (1 point) [1 or 2 (0 pts)] : 1 or 2 (0 points) [Never (0 pts)] : Never (0 points) [No] : In the past 12 months have you used drugs other than those required for medical reasons? 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

## 2024-05-09 NOTE — ASSESSMENT
[FreeTextEntry1] :  1 predm pt has improved his diabetes to  prediabetes  He should continue low sugar diet GLYCEMIC INDEX: Foods can be measured by how much they are likely to raise blood sugar. This is called the glycemic index. We want you to eat mostly foods that have a low glycemic index.  Fruit: choose cherries, grapefruit, prunes, dried apricots, apples, peaches, pears, blueberries, strawberries, oranges, and grapes.  Breads and other starches: Choose pumpernickel, rye, sourdough, or stone-ground whole wheat bread. For cereal, choose bran flakes, oat flakes, and oatmeal. For rice, use long-grained white rice. Most pasta is fairly low on the glycemic index; whole wheat or egg pasta is the lowest. Choose new or sweet potatoes and yams.  Dairy products: Dairy tends to be fairly low on the glycemic index because of the protein and fat in it. Premium ice cream is lower on the glycemic index than low-fat ice cream.  Salty snacks: Hummus, peanuts, walnuts, and cashews are all good choices.  Sweets: Most sweets are high on the glycemic index, so make them special treats only. Ones that have protein and fat in them tend to be a bit lower. Milk chocolate or peanut candies are good choices. Pound and sponge cakes are lower on the glycemic index than other types of cakes. For cookies, choose peanut butter, chocolate chip, butter, and oatmeal cookies. For a breakfast treat, choose scones or oatmeal muffins.  Beans: Choose negrito, chickpeas (garbanzo beans), lentils, split peas, lima beans, and kidney beans.  Meat and vegetables are low on the glycemic index. Soups and stews made with meat or vegetables are also good. 2 fatty liver recent us of abd  shows resolution of ashleigh liver   3. hld   he no lkonge rhas hypertriglyceridemia   continue lowfatdiet to lower  LDL and non HDL  cholesterol levels     1 limit your intake of foods full of saturated fats  , trans fats, and dietary cholesterol .  Food with a lot of saturated fate include butter, fatty flesh like red meat, full fat and low fat dairy  products  , palm oil and coconut oil .   If you see partially hydrogenated fat in the ingredient  list of food label that food has trans fats.  Top sources of dietary chol include egg yolks , organ meats and shell fish.  one Type of fat omega 3 Fatty acids has been shown to protect against heart disease  . Good sources are cold water fish like salmon, mackerel , halibut ., trout  herring and sardines.     Limit  your intake of meat , poultry and fish to no more than 3.5  ounces per day     Eat a lot more fiber rich foods  like beans , oats, barley fruits and vegetables   .  Food naturally rich in soluble fiber  are good at lowering cholesterol .    Excellent choices  are  oats , oat bran , barley , peas , yams sweet potatoes and legumes  or beans .  Good fruit sources are berries passion fruit, oranges pears,, apricots , apples  and nectarines  .    choose protein rich plant foods   such as legumes or beans nuts and seeds over meat.     Lose as much weight as possible and exercise   Take plant sterol supplements   .A Daily intake  of 1-2 gms  of plant sterols  lowers ldl .    Best choice is supplements  such as Cholestoff  by natures made   because they dont have calories  sugar trans fats   an salt  of many foods enriched with plant sterols.    Take  Metamucil or psyllium   .   Studies have shown 9-10 gms as daily of psyllium   the equivalent of  about  3 teaspoons  of sugar free Metamucil   reduced LDL levels  . 4. bmi 25   Weight loss, exercise, and diet control were discussed and are highly encouraged. Treatment options were given such as, aqua therapy, and contacting a nutritionist. Recommended to use the elliptical, stationary bike, less use of treadmill. Mindful eating was explained to the patient Obesity is associated with worsening asthma, shortness of breath, and potential for cardiac disease, diabetes, and other underlying medical conditions. 5. rash  he presently doesnt have rash and uses cream when it occurs and uses it for  two days and it resolved.  6 constipation  Fiber  is a substance found in plants. Dietary fiber is a type of carbohydrate we eat. Eating the right amount of fiber has been shown to have a range of health benefits. It helps you feel full longer, which can curb overeating and weight gain. Eating fiber-rich foods aids in digestion  and the absorption of nutrients.  Foods that are high in fiber can help treat certain issues. These include constipation, irritable bowel  syndrome (IBS), and diverticulitis. Dietary fiber can reduce your risk of coronary heart  disease, type 2 diabetes, and some cancers. It also may lower your cholesterol.  Path to improved health  The amount of fiber you should get from your daily diet  depends on your age and gender. Men 50 years of age and younger should consume at least 38 grams of fiber per day. Men older than 50 years of age should get at least 30 grams of fiber daily. Women 50 years of age and younger should consume at least 25 grams of fiber per day. Women older than 50 years of age should get at least 21 grams of fiber daily.  The following changes can increase the fiber in your diet. 1.Eat at least 2 cups of fruits and 2 1/2 cups of vegetables each day. This can include: 1 artichoke 1 medium sweet potato 1 small pear  cup of green peas  cup of berries, such as raspberries or blackberries  cup of prunes  cup figs or dates  cup of spinach 1 medium apple 1 medium orange. 2.Replace refined white bread with whole-grain breads and cereals. Choose brown rice instead of white rice. Eat the following foods: 100% whole-wheat bread oatmeal brown rice bran muffins bran or multiple-grain cereals, cooked or dry popcorn (unbuttered) almonds. 3.Check nutrition fact labels for the amount of dietary fiber. Try to get 5 grams of fiber per serving. 4.Add  cup of wheat bran (millers bran) to foods. You can put it in cooked cereal, applesauce, or meat loaf. 5.Eat  cup cooked beans, such as navy, kidney, ruiz, black, lima, or white beans.  Things to consider  Try not to add too much fiber to your diet at once. You may get symptoms such as bloating, cramping, or gas. You can prevent these by increasing your fiber slowly. Start with one of the changes listed above. Wait several days to a week before making another. If one change doesnt seem to work for you, try a different one. Be sure to drink more fluids as you increase the amount of fiber you eat. Fluids help your body digest fiber. Try to drink 8 glasses a day. Choose no- or low-calorie beverages, such as water, unsweetened tea, or diet soda.

## 2024-05-09 NOTE — COUNSELING
[Sleep ___ hours/day] : Sleep [unfilled] hours/day [Engage in a relaxing activity] : Engage in a relaxing activity [Plan in advance] : Plan in advance [Potential consequences of obesity discussed] : Potential consequences of obesity discussed [Benefits of weight loss discussed] : Benefits of weight loss discussed [Structured Weight Management Program suggested:] : Structured weight management program suggested [Encouraged to maintain food diary] : Encouraged to maintain food diary [Encouraged to increase physical activity] : Encouraged to increase physical activity [Encouraged to use exercise tracking device] : Encouraged to use exercise tracking device [Weigh Self Weekly] : weigh self weekly [Decrease Portions] : decrease portions [____ min/wk Activity] : [unfilled] min/wk activity [Keep Food Diary] : keep food diary [FreeTextEntry1] : low fat low areli  [FreeTextEntry2] : bmi 25  weight 160

## 2024-05-09 NOTE — HISTORY OF PRESENT ILLNESS
[FreeTextEntry1] : fu  [de-identified] : Pt is sp baractric surgery  with weight loss and had dm hld after the surgery his diabetes improived and last hgbA1c was 5.8   and his tg 115  chol 188 ldl 120 .  He -denies any headaches, nausea, vomiting, fever, chills, sweats, chest pain, chest pressure, diarrhea, constipation, dysphagia, sour taste in the mouth, dizziness, leg swelling, leg pain, myalgias, arthralgias, itchy eyes, itchy ears, heartburn, or reflux. Pt states he has a rash on both lower legs  pruritic  and feels in am  and has little dots  red .  His wife doesnt have one and he states it comes and goes  can last 5 days and then resolves and comes back after a few months.  He only has it on his l egs and no other place on his body.

## 2024-05-09 NOTE — PLAN
[FreeTextEntry1] : cpe next visit   He will come on fast and will check his vitamin levles sp bariactric surgery.

## 2024-08-14 NOTE — ED ADULT NURSE NOTE - TEMPLATE
We are committed to providing our patients with their test results in a timely manner. If you have access to [facility], you will receive your results within 5 to 7 days. If your results are normal, a member of our office may call you or you may receive a letter in the mail within 10 to 15 days of your testing. If your results have something additional to discuss, a member of our office will contact you by phone. If at any time you have questions related your results, please feel free to call our office at [phone]  
General

## 2024-09-25 NOTE — H&P ADULT - NSHPPHYSICALEXAM_GEN_ALL_CORE
Statement Selected
Vital Signs (24 Hrs):  T(C): 37.1 (03-03-21 @ 22:34), Max: 37.1 (03-03-21 @ 22:34)  HR: 115 (03-03-21 @ 22:34) (115 - 115)  BP: 127/88 (03-03-21 @ 22:34) (127/88 - 127/88)  RR: 16 (03-03-21 @ 22:34) (16 - 16)  SpO2: 97% (03-03-21 @ 22:34) (97% - 97%)  Wt(kg): --  Daily Height in cm: 170.18 (03 Mar 2021 22:34)    Daily     I&O's Summary

## 2024-10-21 ENCOUNTER — APPOINTMENT (OUTPATIENT)
Dept: INTERNAL MEDICINE | Facility: CLINIC | Age: 44
End: 2024-10-21

## 2025-03-26 ENCOUNTER — APPOINTMENT (OUTPATIENT)
Dept: UROLOGY | Facility: CLINIC | Age: 45
End: 2025-03-26
Payer: COMMERCIAL

## 2025-03-26 ENCOUNTER — NON-APPOINTMENT (OUTPATIENT)
Age: 45
End: 2025-03-26

## 2025-03-26 VITALS
HEART RATE: 60 BPM | OXYGEN SATURATION: 100 % | WEIGHT: 164 LBS | SYSTOLIC BLOOD PRESSURE: 134 MMHG | TEMPERATURE: 98.4 F | BODY MASS INDEX: 25.69 KG/M2 | DIASTOLIC BLOOD PRESSURE: 79 MMHG

## 2025-03-26 DIAGNOSIS — R39.15 URGENCY OF URINATION: ICD-10-CM

## 2025-03-26 DIAGNOSIS — N52.9 MALE ERECTILE DYSFUNCTION, UNSPECIFIED: ICD-10-CM

## 2025-03-26 DIAGNOSIS — N46.9 MALE INFERTILITY, UNSPECIFIED: ICD-10-CM

## 2025-03-26 PROCEDURE — 99214 OFFICE O/P EST MOD 30 MIN: CPT

## 2025-03-26 PROCEDURE — G2211 COMPLEX E/M VISIT ADD ON: CPT | Mod: NC

## 2025-07-17 ENCOUNTER — APPOINTMENT (OUTPATIENT)
Dept: INTERNAL MEDICINE | Facility: CLINIC | Age: 45
End: 2025-07-17
Payer: COMMERCIAL

## 2025-07-17 VITALS
HEIGHT: 67 IN | OXYGEN SATURATION: 98 % | DIASTOLIC BLOOD PRESSURE: 78 MMHG | HEART RATE: 64 BPM | BODY MASS INDEX: 26.37 KG/M2 | TEMPERATURE: 97.2 F | SYSTOLIC BLOOD PRESSURE: 134 MMHG | WEIGHT: 168 LBS

## 2025-07-17 PROBLEM — L72.3 SEBACEOUS CYST: Status: ACTIVE | Noted: 2025-07-17

## 2025-07-17 PROBLEM — Z87.438 HISTORY OF MALE INFERTILITY: Status: RESOLVED | Noted: 2025-03-26 | Resolved: 2025-07-17

## 2025-07-17 PROCEDURE — 99396 PREV VISIT EST AGE 40-64: CPT

## 2025-07-18 PROBLEM — D72.819 LEUKOPENIA: Status: ACTIVE | Noted: 2025-07-18

## 2025-07-18 LAB
25(OH)D3 SERPL-MCNC: 33.2 NG/ML
ALBUMIN SERPL ELPH-MCNC: 5 G/DL
ALP BLD-CCNC: 79 U/L
ALT SERPL-CCNC: 16 U/L
ANION GAP SERPL CALC-SCNC: 11 MMOL/L
APPEARANCE: CLEAR
AST SERPL-CCNC: 26 U/L
BASOPHILS # BLD AUTO: 0.04 K/UL
BASOPHILS NFR BLD AUTO: 1.1 %
BILIRUB SERPL-MCNC: 0.4 MG/DL
BILIRUBIN URINE: NEGATIVE
BLOOD URINE: NEGATIVE
BUN SERPL-MCNC: 17 MG/DL
CALCIUM SERPL-MCNC: 10.2 MG/DL
CHLORIDE SERPL-SCNC: 105 MMOL/L
CHOLEST SERPL-MCNC: 200 MG/DL
CO2 SERPL-SCNC: 24 MMOL/L
COLOR: YELLOW
CREAT SERPL-MCNC: 0.9 MG/DL
EGFRCR SERPLBLD CKD-EPI 2021: 109 ML/MIN/1.73M2
EOSINOPHIL # BLD AUTO: 0.07 K/UL
EOSINOPHIL NFR BLD AUTO: 1.9 %
ESTIMATED AVERAGE GLUCOSE: 117 MG/DL
FRUCTOSAMINE SERPL-MCNC: 238 UMOL/L
GLUCOSE QUALITATIVE U: NEGATIVE MG/DL
GLUCOSE SERPL-MCNC: 110 MG/DL
HBA1C MFR BLD HPLC: 5.7 %
HCT VFR BLD CALC: 44.4 %
HDLC SERPL-MCNC: 47 MG/DL
HGB BLD-MCNC: 14.5 G/DL
HIV1+2 AB SPEC QL IA.RAPID: NONREACTIVE
IMM GRANULOCYTES NFR BLD AUTO: 0.3 %
KETONES URINE: NEGATIVE MG/DL
LDLC SERPL-MCNC: 134 MG/DL
LEUKOCYTE ESTERASE URINE: NEGATIVE
LYMPHOCYTES # BLD AUTO: 1.1 K/UL
LYMPHOCYTES NFR BLD AUTO: 30.1 %
MAN DIFF?: NORMAL
MCHC RBC-ENTMCNC: 31.5 PG
MCHC RBC-ENTMCNC: 32.7 G/DL
MCV RBC AUTO: 96.5 FL
MONOCYTES # BLD AUTO: 0.31 K/UL
MONOCYTES NFR BLD AUTO: 8.5 %
NEUTROPHILS # BLD AUTO: 2.12 K/UL
NEUTROPHILS NFR BLD AUTO: 58.1 %
NITRITE URINE: NEGATIVE
NONHDLC SERPL-MCNC: 154 MG/DL
PH URINE: 7.5
PLATELET # BLD AUTO: 271 K/UL
POTASSIUM SERPL-SCNC: 5.1 MMOL/L
PROT SERPL-MCNC: 7.1 G/DL
PROTEIN URINE: NEGATIVE MG/DL
PSA SERPL-MCNC: 1.19 NG/ML
RBC # BLD: 4.6 M/UL
RBC # FLD: 12.5 %
SODIUM SERPL-SCNC: 141 MMOL/L
SPECIFIC GRAVITY URINE: 1.02
T PALLIDUM AB SER QL IA: NEGATIVE
TRIGL SERPL-MCNC: 112 MG/DL
UROBILINOGEN URINE: 1 MG/DL
WBC # FLD AUTO: 3.65 K/UL

## 2025-07-20 LAB — UREA BREATH TEST QL: NEGATIVE

## 2025-07-21 RX ORDER — FAMOTIDINE 20 MG/1
20 TABLET, FILM COATED ORAL
Qty: 90 | Refills: 0 | Status: ACTIVE | COMMUNITY
Start: 2025-07-17 | End: 1900-01-01

## 2025-07-22 LAB
M TB IFN-G BLD-IMP: NEGATIVE
QUANTIFERON TB PLUS MITOGEN MINUS NIL: >10 IU/ML
QUANTIFERON TB PLUS NIL: 0.02 IU/ML
QUANTIFERON TB PLUS TB1 MINUS NIL: 0 IU/ML
QUANTIFERON TB PLUS TB2 MINUS NIL: 0 IU/ML